# Patient Record
Sex: MALE | Race: WHITE | Employment: FULL TIME | URBAN - METROPOLITAN AREA
[De-identification: names, ages, dates, MRNs, and addresses within clinical notes are randomized per-mention and may not be internally consistent; named-entity substitution may affect disease eponyms.]

---

## 2018-09-05 ENCOUNTER — OFFICE VISIT (OUTPATIENT)
Dept: FAMILY MEDICINE CLINIC | Age: 33
End: 2018-09-05
Payer: COMMERCIAL

## 2018-09-05 VITALS
HEIGHT: 65 IN | DIASTOLIC BLOOD PRESSURE: 90 MMHG | BODY MASS INDEX: 22.26 KG/M2 | TEMPERATURE: 98.3 F | WEIGHT: 133.6 LBS | OXYGEN SATURATION: 96 % | HEART RATE: 115 BPM | SYSTOLIC BLOOD PRESSURE: 120 MMHG

## 2018-09-05 DIAGNOSIS — F17.200 SMOKER UNMOTIVATED TO QUIT: ICD-10-CM

## 2018-09-05 DIAGNOSIS — R06.2 WHEEZING: ICD-10-CM

## 2018-09-05 DIAGNOSIS — R11.2 NON-INTRACTABLE VOMITING WITH NAUSEA, UNSPECIFIED VOMITING TYPE: Primary | ICD-10-CM

## 2018-09-05 PROCEDURE — 99203 OFFICE O/P NEW LOW 30 MIN: CPT | Performed by: NURSE PRACTITIONER

## 2018-09-05 PROCEDURE — G8420 CALC BMI NORM PARAMETERS: HCPCS | Performed by: NURSE PRACTITIONER

## 2018-09-05 PROCEDURE — G8427 DOCREV CUR MEDS BY ELIG CLIN: HCPCS | Performed by: NURSE PRACTITIONER

## 2018-09-05 PROCEDURE — 4004F PT TOBACCO SCREEN RCVD TLK: CPT | Performed by: NURSE PRACTITIONER

## 2018-09-05 RX ORDER — RANITIDINE 150 MG/1
150 TABLET ORAL
COMMUNITY
Start: 2016-08-24 | End: 2018-11-08

## 2018-09-05 RX ORDER — ONDANSETRON 4 MG/1
4 TABLET, ORALLY DISINTEGRATING ORAL EVERY 8 HOURS PRN
Qty: 20 TABLET | Refills: 0 | Status: SHIPPED | OUTPATIENT
Start: 2018-09-05 | End: 2018-11-08

## 2018-09-05 RX ORDER — FOLIC ACID 1 MG/1
1 TABLET ORAL
COMMUNITY
Start: 2017-05-03 | End: 2020-08-18

## 2018-09-05 RX ORDER — ALBUTEROL SULFATE 90 UG/1
2 AEROSOL, METERED RESPIRATORY (INHALATION) EVERY 6 HOURS PRN
Qty: 1 INHALER | Refills: 3 | Status: SHIPPED | OUTPATIENT
Start: 2018-09-05 | End: 2019-01-28

## 2018-09-05 ASSESSMENT — PATIENT HEALTH QUESTIONNAIRE - PHQ9
1. LITTLE INTEREST OR PLEASURE IN DOING THINGS: 0
SUM OF ALL RESPONSES TO PHQ QUESTIONS 1-9: 0
SUM OF ALL RESPONSES TO PHQ9 QUESTIONS 1 & 2: 0
2. FEELING DOWN, DEPRESSED OR HOPELESS: 0
SUM OF ALL RESPONSES TO PHQ QUESTIONS 1-9: 0

## 2018-09-05 NOTE — PROGRESS NOTES
[address] [facility]    Malu Cervantes is a 29 y.o. male who is here with c/o of:    Chief Complaint: Nausea (started yesterday)      Patient Accompanied by: patient    ERASMO Cervantes is here with c/o    Acute GI Symptoms:  Patient complains of a 2 day history of nausea- intermittent, occuring several times per day, and lasting several hours per episode and vomiting, occuring several times per day. Symptoms have been worsening with time. Associated symptoms include none. Patient denies any other symptoms. Symptoms are exacerbated by any oral intake. Prior history of similar symptoms: no.  Relevant PMH: none. New exposures: none. Therapy to date: Pepto Bismol. Prior diagnostic testing: none. There is no problem list on file for this patient. No past medical history on file. No past surgical history on file. No family history on file. Social History   Substance Use Topics    Smoking status: Current Every Day Smoker    Smokeless tobacco: Never Used    Alcohol use Not on file     ALLERGIES:    Allergies   Allergen Reactions    Penicillins Hives          Subjective     · Constitutional:  Negative for activity change, appetite change, chills, fatigue, fever and unexpected weight change. · HENT: Negative for congestion, ear pain, rhinorrhea, sinus pain, sinus pressure and sore throat. · Eyes:  Negative for pain and discharge. · Respiratory:  Negative for cough, chest tightness, shortness of breath and wheezing. · Cardiovascular:  Negative for chest pain, palpitations and leg swelling. · Gastrointestinal: Negative for abdominal pain, blood in stool, constipation,diarrhea, Positive for nausea and vomiting. · Endocrine: Negative for cold intolerance, heat intolerance, polydipsia, polyphagia and polyuria. · Genitourinary: Negative for difficulty urinating, dysuria, flank pain, frequency, hematuria and urgency.    · Musculoskeletal: Negative for arthralgias, back educational materials - see patient instructions  3. Was a self-tracking handout given in paper form or via [employer]? No  If yes, see orders or list here. 4.  Discussed use, benefit, and side effects of prescribed medications. Barriers to medication compliance addressed. All patient questions answered. Pt voiced understanding. 5.  Reviewed prior labs, imaging, consultation, follow up, and health maintenance  6. Continue current medications, diet and exercise. 7. Discussed use, benefit, and side effects of prescribed medications. Barriers to medication compliance addressed. All her questions were answered. Pt voiced understanding. Seth Baer will continue current medications, diet and exercise. Completed Orders/Prescriptions   Orders Placed This Encounter   Medications    ondansetron (ZOFRAN-ODT) 4 MG disintegrating tablet     Sig: Take 1 tablet by mouth every 8 hours as needed for Nausea or Vomiting     Dispense:  20 tablet     Refill:  0    albuterol sulfate HFA (PROAIR HFA) 108 (90 Base) MCG/ACT inhaler     Sig: Inhale 2 puffs into the lungs every 6 hours as needed for Wheezing     Dispense:  1 Inhaler     Refill:  3    Spacer/Aero-Holding Chambers OLVIN     Si Device by Does not apply route daily as needed (wheezing)     Dispense:  1 Device     Refill:  0             Patient given educational materials on smoking cessation, dehydration    Of the 30 minute duration appointment visit, Rose Gutierrez CNP spent at least 50% of the face-to-face time in counseling, explanation of diagnosis, planning of further management, and answering all questions. Signed:  Rose Gutierrez CNP    This note is created with the assistance of a speech-recognition program.  While intending to generate a document that actually reflects the content of the visit, no guarantees can be provided that every mistake has been identified and corrected by editing.

## 2018-09-05 NOTE — LETTER
[address]  [place] [address]  Phone: [phone]  Fax: [phone]    JOAQUINA Mccracken CNP        September 5, 2018     Patient: Melissa [place]   YOB: 1985   Date of Visit: 9/5/2018       To Whom It May Concern: It is my medical opinion  Melissa [place] is unable to work until 9/7/2018    If you have any questions or concerns, please don't hesitate to call.     Sincerely,        JOAQUINA Mccracken CNP

## 2018-11-08 ENCOUNTER — OFFICE VISIT (OUTPATIENT)
Dept: FAMILY MEDICINE CLINIC | Age: 33
End: 2018-11-08
Payer: COMMERCIAL

## 2018-11-08 VITALS
TEMPERATURE: 98.6 F | WEIGHT: 142 LBS | RESPIRATION RATE: 20 BRPM | SYSTOLIC BLOOD PRESSURE: 132 MMHG | DIASTOLIC BLOOD PRESSURE: 74 MMHG | OXYGEN SATURATION: 98 % | HEART RATE: 74 BPM | BODY MASS INDEX: 23.63 KG/M2

## 2018-11-08 DIAGNOSIS — R52 GENERALIZED BODY ACHES: Primary | ICD-10-CM

## 2018-11-08 PROCEDURE — 4004F PT TOBACCO SCREEN RCVD TLK: CPT | Performed by: NURSE PRACTITIONER

## 2018-11-08 PROCEDURE — G8427 DOCREV CUR MEDS BY ELIG CLIN: HCPCS | Performed by: NURSE PRACTITIONER

## 2018-11-08 PROCEDURE — G8484 FLU IMMUNIZE NO ADMIN: HCPCS | Performed by: NURSE PRACTITIONER

## 2018-11-08 PROCEDURE — 99213 OFFICE O/P EST LOW 20 MIN: CPT | Performed by: NURSE PRACTITIONER

## 2018-11-08 PROCEDURE — G8420 CALC BMI NORM PARAMETERS: HCPCS | Performed by: NURSE PRACTITIONER

## 2018-11-08 RX ORDER — AZITHROMYCIN 500 MG/1
500 TABLET, FILM COATED ORAL DAILY
Qty: 6 TABLET | Refills: 0 | Status: CANCELLED | OUTPATIENT
Start: 2018-11-08 | End: 2018-11-11

## 2018-11-08 ASSESSMENT — ENCOUNTER SYMPTOMS
COUGH: 0
VOMITING: 0
DIARRHEA: 0
ABDOMINAL DISTENTION: 0
ABDOMINAL PAIN: 0
NAUSEA: 1
WHEEZING: 0
CHEST TIGHTNESS: 0
CONSTIPATION: 0
SHORTNESS OF BREATH: 0
BACK PAIN: 1

## 2018-11-08 NOTE — PATIENT INSTRUCTIONS
judgment and decision making. Talk with your doctor about when it is safe to drive. · Avoid alcohol, sleeping medicines, and muscle relaxers. Opioids can be dangerous if you take them with alcohol or with certain drugs. This includes over-the-counter medicines. Make sure your doctor knows about all the other medicines you take. Don't start any new medicines before you talk to your doctor or pharmacist.  · Ask your doctor about a naloxone rescue kit. It can help you--and even save your life--if you take too much of an opioid. How do you safely store opioid pills and patches? It's important to store opioids safely so that they aren't used by the wrong person. Your pain medicine is only for you to take. If someone else takes your medicine, it can harm that person. You can safely store your medicine. Follow these tips. · Store pills and patches up high and out of sight. ? Keep them away from children and pets. ? Return the container to the same place each time you take your medicine. · Try locking your opioid medicine in a cabinet. · Make sure the bottles are closed tightly. If they have a safety cap, make sure that it's locked. Tighten the cap until you hear a click or can't twist it anymore. · Keep track of how many pills or patches you have left. You may want to keep track in a notebook. · Let the people who live with you know about your medicine. ? Tell them that it is only for you to take. ? If guests have opioid medicine with them, ask them to keep it safe. How do you get rid of opioid pills and patches safely? If you have opioid pills or patches that you aren't going to use, get rid of them right away. It's also important to get rid of used opioid patches. When you get rid of these medicines safely, you take away any chance that a person or an animal might get sick from one of them. Follow one of these steps. If you can't do the first step, then take the next step.   · Bring them to a Drug Enforcement Administration (AGUSTÍN)-authorized medicine take-back program or drop-off box. ? Your local trash and recycle center, pharmacy, or hospital may offer one of these. · Throw the medicines in the trash. Take this step if you can't get to a take-back program or drop-off box and the medicine's instructions do not have specific disposal information. ? Take the medicine out of its container. ? Mix it with something that tastes bad, like cat litter or coffee grounds. ? Place the mixture in a sealed plastic bag and put the bag in your household trash. · Flush them down the sink or toilet. ? You can flush your medicine down the toilet or sink only if you can't go to a AGUSTÍN-approved site or if your medicine's instructions specifically say to.  ? If you are throwing away a patch, first fold the sticky sides together. ? To see a list of medicines that should be flushed, go to: www.fda.gov/Drugs/ResourcesForYou/Consumers/BuyingUsingMedicineSafely/EnsuringSafeUseofMedicine/SafeDisposalofMedicines/vqx661758.htm. Follow-up care is a key part of your treatment and safety. Be sure to make and go to all appointments, and call your doctor if you are having problems. It's also a good idea to know your test results and keep a list of the medicines you take. Where can you learn more? Go to https://ADstruc.FreeCharge. org and sign in to your Playnery account. Enter P175 in the [facility] box to learn more about \"Learning About Managing Acute Pain at Home. \"     If you do not have an account, please click on the \"Sign Up Now\" link. Current as of: June 4, 2018  Content Version: 11.8  © 9275-4692 Healthwise, Incorporated. Care instructions adapted under license by [facility] ([facility]). If you have questions about a medical condition or this instruction, always ask your healthcare professional. [place] [address] any warranty or liability for your use of this information.

## 2018-11-08 NOTE — LETTER
[address]  [place] [address]  Phone: [phone]  Fax: [phone]    JOAQUINA Ugarte CNP        November 8, 2018     Patient: Keya Cuello   YOB: 1985   Date of Visit: 11/8/2018       To Whom it May Concern:    Keya Cuello was seen in my clinic on 11/8/2018. He may return to work on 11/12/18. He is unable to work on 11/8/18 and 11/9/18. Please excuse his absence on these days. If you have any questions or concerns, please don't hesitate to call.     Sincerely,           JOAQUINA Ugarte CNP

## 2019-01-28 ENCOUNTER — OFFICE VISIT (OUTPATIENT)
Dept: FAMILY MEDICINE CLINIC | Age: 34
End: 2019-01-28
Payer: COMMERCIAL

## 2019-01-28 VITALS
HEART RATE: 101 BPM | SYSTOLIC BLOOD PRESSURE: 130 MMHG | OXYGEN SATURATION: 98 % | TEMPERATURE: 99 F | DIASTOLIC BLOOD PRESSURE: 84 MMHG

## 2019-01-28 DIAGNOSIS — S62.663A NONDISPLACED FRACTURE OF DISTAL PHALANX OF LEFT MIDDLE FINGER, INITIAL ENCOUNTER FOR CLOSED FRACTURE: Primary | ICD-10-CM

## 2019-01-28 DIAGNOSIS — S69.92XA INJURY OF LEFT MIDDLE FINGER, INITIAL ENCOUNTER: ICD-10-CM

## 2019-01-28 PROCEDURE — 29130 APPL FINGER SPLINT STATIC: CPT | Performed by: NURSE PRACTITIONER

## 2019-01-28 PROCEDURE — G8484 FLU IMMUNIZE NO ADMIN: HCPCS | Performed by: NURSE PRACTITIONER

## 2019-01-28 PROCEDURE — 4004F PT TOBACCO SCREEN RCVD TLK: CPT | Performed by: NURSE PRACTITIONER

## 2019-01-28 PROCEDURE — G8427 DOCREV CUR MEDS BY ELIG CLIN: HCPCS | Performed by: NURSE PRACTITIONER

## 2019-01-28 PROCEDURE — 99213 OFFICE O/P EST LOW 20 MIN: CPT | Performed by: NURSE PRACTITIONER

## 2019-01-28 PROCEDURE — G8420 CALC BMI NORM PARAMETERS: HCPCS | Performed by: NURSE PRACTITIONER

## 2019-01-28 RX ORDER — ADALIMUMAB 40MG/0.8ML
0.8 KIT SUBCUTANEOUS
COMMUNITY
Start: 2019-01-14 | End: 2020-08-18

## 2019-01-28 RX ORDER — PREDNISOLONE ACETATE 10 MG/ML
1 SUSPENSION/ DROPS OPHTHALMIC 4 TIMES DAILY
COMMUNITY
Start: 2019-01-03 | End: 2020-08-18

## 2019-01-28 ASSESSMENT — ENCOUNTER SYMPTOMS
SHORTNESS OF BREATH: 0
WHEEZING: 0
COUGH: 0
RESPIRATORY NEGATIVE: 1
COLOR CHANGE: 1
CHEST TIGHTNESS: 0

## 2020-08-29 ENCOUNTER — HOSPITAL ENCOUNTER (OUTPATIENT)
Dept: PREADMISSION TESTING | Age: 35
Setting detail: SPECIMEN
Discharge: HOME | End: 2020-09-02
Payer: COMMERCIAL

## 2020-08-29 PROCEDURE — U0003 INFECTIOUS AGENT DETECTION BY NUCLEIC ACID (DNA OR RNA); SEVERE ACUTE RESPIRATORY SYNDROME CORONAVIRUS 2 (SARS-COV-2) (CORONAVIRUS DISEASE [COVID-19]), AMPLIFIED PROBE TECHNIQUE, MAKING USE OF HIGH THROUGHPUT TECHNOLOGIES AS DESCRIBED BY CMS-2020-01-R: HCPCS

## 2020-08-31 LAB — SARS-COV-2, NAA: NOT DETECTED

## 2020-09-01 ENCOUNTER — TELEPHONE (OUTPATIENT)
Dept: PRIMARY CARE CLINIC | Age: 35
End: 2020-09-01

## 2020-09-02 ENCOUNTER — ANESTHESIA EVENT (OUTPATIENT)
Dept: OPERATING ROOM | Age: 35
DRG: 455 | End: 2020-09-02
Payer: COMMERCIAL

## 2020-09-02 ENCOUNTER — APPOINTMENT (OUTPATIENT)
Dept: GENERAL RADIOLOGY | Age: 35
DRG: 455 | End: 2020-09-02
Attending: ORTHOPAEDIC SURGERY
Payer: COMMERCIAL

## 2020-09-02 ENCOUNTER — HOSPITAL ENCOUNTER (INPATIENT)
Age: 35
LOS: 1 days | Discharge: HOME | DRG: 455 | End: 2020-09-03
Attending: ORTHOPAEDIC SURGERY | Admitting: ORTHOPAEDIC SURGERY
Payer: COMMERCIAL

## 2020-09-02 ENCOUNTER — ANESTHESIA (OUTPATIENT)
Dept: OPERATING ROOM | Age: 35
DRG: 455 | End: 2020-09-02
Payer: COMMERCIAL

## 2020-09-02 VITALS — OXYGEN SATURATION: 100 % | SYSTOLIC BLOOD PRESSURE: 98 MMHG | DIASTOLIC BLOOD PRESSURE: 51 MMHG | TEMPERATURE: 98.1 F

## 2020-09-02 PROBLEM — M43.10 SPONDYLOLISTHESIS, ACQUIRED: Chronic | Status: ACTIVE | Noted: 2020-09-02

## 2020-09-02 PROBLEM — M48.061 FORAMINAL STENOSIS OF LUMBAR REGION: Chronic | Status: ACTIVE | Noted: 2020-09-02

## 2020-09-02 LAB
ABSOLUTE EOS #: 0.2 K/UL (ref 0–0.44)
ABSOLUTE IMMATURE GRANULOCYTE: 0.03 K/UL (ref 0–0.3)
ABSOLUTE LYMPH #: 1.25 K/UL (ref 1.1–3.7)
ABSOLUTE MONO #: 0.9 K/UL (ref 0.1–1.2)
ANION GAP SERPL CALCULATED.3IONS-SCNC: 13 MMOL/L (ref 9–17)
BASOPHILS # BLD: 1 % (ref 0–2)
BASOPHILS ABSOLUTE: 0.08 K/UL (ref 0–0.2)
BILIRUBIN URINE: NEGATIVE
BUN BLDV-MCNC: 11 MG/DL (ref 6–20)
BUN/CREAT BLD: 19 (ref 9–20)
CALCIUM SERPL-MCNC: 9.9 MG/DL (ref 8.6–10.4)
CHLORIDE BLD-SCNC: 98 MMOL/L (ref 98–107)
CO2: 25 MMOL/L (ref 20–31)
COLOR: YELLOW
COMMENT UA: NORMAL
CREAT SERPL-MCNC: 0.58 MG/DL (ref 0.7–1.2)
DIFFERENTIAL TYPE: ABNORMAL
EOSINOPHILS RELATIVE PERCENT: 3 % (ref 1–4)
GFR AFRICAN AMERICAN: >60 ML/MIN
GFR NON-AFRICAN AMERICAN: >60 ML/MIN
GFR SERPL CREATININE-BSD FRML MDRD: ABNORMAL ML/MIN/{1.73_M2}
GFR SERPL CREATININE-BSD FRML MDRD: ABNORMAL ML/MIN/{1.73_M2}
GLUCOSE BLD-MCNC: 106 MG/DL (ref 70–99)
GLUCOSE URINE: NEGATIVE
HCT VFR BLD CALC: 45.5 % (ref 40.7–50.3)
HEMOGLOBIN: 16 G/DL (ref 13–17)
IMMATURE GRANULOCYTES: 0 %
INR BLD: 0.9
KETONES, URINE: NEGATIVE
LEUKOCYTE ESTERASE, URINE: NEGATIVE
LYMPHOCYTES # BLD: 17 % (ref 24–43)
MCH RBC QN AUTO: 34.3 PG (ref 25.2–33.5)
MCHC RBC AUTO-ENTMCNC: 35.2 G/DL (ref 28.4–34.8)
MCV RBC AUTO: 97.6 FL (ref 82.6–102.9)
MONOCYTES # BLD: 12 % (ref 3–12)
NITRITE, URINE: NEGATIVE
NRBC AUTOMATED: 0 PER 100 WBC
PARTIAL THROMBOPLASTIN TIME: 29.9 SEC (ref 23.9–33.8)
PDW BLD-RTO: 13.2 % (ref 11.8–14.4)
PH UA: 7.5 (ref 5–8)
PLATELET # BLD: 203 K/UL (ref 138–453)
PLATELET ESTIMATE: ABNORMAL
PMV BLD AUTO: 9.7 FL (ref 8.1–13.5)
POTASSIUM SERPL-SCNC: 4.2 MMOL/L (ref 3.7–5.3)
PROTEIN UA: NEGATIVE
PROTHROMBIN TIME: 12.1 SEC (ref 11.5–14.2)
RBC # BLD: 4.66 M/UL (ref 4.21–5.77)
RBC # BLD: ABNORMAL 10*6/UL
SEG NEUTROPHILS: 67 % (ref 36–65)
SEGMENTED NEUTROPHILS ABSOLUTE COUNT: 4.94 K/UL (ref 1.5–8.1)
SODIUM BLD-SCNC: 136 MMOL/L (ref 135–144)
SPECIFIC GRAVITY UA: 1.01 (ref 1–1.03)
TURBIDITY: CLEAR
URINE HGB: NEGATIVE
UROBILINOGEN, URINE: NORMAL
WBC # BLD: 7.4 K/UL (ref 3.5–11.3)
WBC # BLD: ABNORMAL 10*3/UL

## 2020-09-02 PROCEDURE — 0SB20ZZ EXCISION OF LUMBAR VERTEBRAL DISC, OPEN APPROACH: ICD-10-PCS | Performed by: ORTHOPAEDIC SURGERY

## 2020-09-02 PROCEDURE — 85610 PROTHROMBIN TIME: CPT

## 2020-09-02 PROCEDURE — 85730 THROMBOPLASTIN TIME PARTIAL: CPT

## 2020-09-02 PROCEDURE — 2780000010 HC IMPLANT OTHER: Performed by: ORTHOPAEDIC SURGERY

## 2020-09-02 PROCEDURE — 2580000003 HC RX 258: Performed by: NURSE ANESTHETIST, CERTIFIED REGISTERED

## 2020-09-02 PROCEDURE — 6360000002 HC RX W HCPCS: Performed by: ORTHOPAEDIC SURGERY

## 2020-09-02 PROCEDURE — 2720000010 HC SURG SUPPLY STERILE: Performed by: ORTHOPAEDIC SURGERY

## 2020-09-02 PROCEDURE — 2580000003 HC RX 258: Performed by: ORTHOPAEDIC SURGERY

## 2020-09-02 PROCEDURE — 7100000001 HC PACU RECOVERY - ADDTL 15 MIN: Performed by: ORTHOPAEDIC SURGERY

## 2020-09-02 PROCEDURE — 3700000000 HC ANESTHESIA ATTENDED CARE: Performed by: ORTHOPAEDIC SURGERY

## 2020-09-02 PROCEDURE — 3600000004 HC SURGERY LEVEL 4 BASE: Performed by: ORTHOPAEDIC SURGERY

## 2020-09-02 PROCEDURE — 0SG0071 FUSION OF LUMBAR VERTEBRAL JOINT WITH AUTOLOGOUS TISSUE SUBSTITUTE, POSTERIOR APPROACH, POSTERIOR COLUMN, OPEN APPROACH: ICD-10-PCS | Performed by: ORTHOPAEDIC SURGERY

## 2020-09-02 PROCEDURE — 85025 COMPLETE CBC W/AUTO DIFF WBC: CPT

## 2020-09-02 PROCEDURE — 3600000014 HC SURGERY LEVEL 4 ADDTL 15MIN: Performed by: ORTHOPAEDIC SURGERY

## 2020-09-02 PROCEDURE — 2500000003 HC RX 250 WO HCPCS: Performed by: ORTHOPAEDIC SURGERY

## 2020-09-02 PROCEDURE — 0SB40ZZ EXCISION OF LUMBOSACRAL DISC, OPEN APPROACH: ICD-10-PCS | Performed by: ORTHOPAEDIC SURGERY

## 2020-09-02 PROCEDURE — 6360000002 HC RX W HCPCS: Performed by: NURSE ANESTHETIST, CERTIFIED REGISTERED

## 2020-09-02 PROCEDURE — 36415 COLL VENOUS BLD VENIPUNCTURE: CPT

## 2020-09-02 PROCEDURE — 0SG00AJ FUSION OF LUMBAR VERTEBRAL JOINT WITH INTERBODY FUSION DEVICE, POSTERIOR APPROACH, ANTERIOR COLUMN, OPEN APPROACH: ICD-10-PCS | Performed by: ORTHOPAEDIC SURGERY

## 2020-09-02 PROCEDURE — 93005 ELECTROCARDIOGRAM TRACING: CPT | Performed by: ORTHOPAEDIC SURGERY

## 2020-09-02 PROCEDURE — 6360000002 HC RX W HCPCS: Performed by: ANESTHESIOLOGY

## 2020-09-02 PROCEDURE — 2709999900 HC NON-CHARGEABLE SUPPLY: Performed by: ORTHOPAEDIC SURGERY

## 2020-09-02 PROCEDURE — 3209999900 FLUORO FOR SURGICAL PROCEDURES

## 2020-09-02 PROCEDURE — 2500000003 HC RX 250 WO HCPCS: Performed by: NURSE ANESTHETIST, CERTIFIED REGISTERED

## 2020-09-02 PROCEDURE — 3700000001 HC ADD 15 MINUTES (ANESTHESIA): Performed by: ORTHOPAEDIC SURGERY

## 2020-09-02 PROCEDURE — 1200000000 HC SEMI PRIVATE

## 2020-09-02 PROCEDURE — 72100 X-RAY EXAM L-S SPINE 2/3 VWS: CPT

## 2020-09-02 PROCEDURE — 81003 URINALYSIS AUTO W/O SCOPE: CPT

## 2020-09-02 PROCEDURE — C1713 ANCHOR/SCREW BN/BN,TIS/BN: HCPCS | Performed by: ORTHOPAEDIC SURGERY

## 2020-09-02 PROCEDURE — 01NB0ZZ RELEASE LUMBAR NERVE, OPEN APPROACH: ICD-10-PCS | Performed by: ORTHOPAEDIC SURGERY

## 2020-09-02 PROCEDURE — C9359 IMPLNT,BON VOID FILLER-PUTTY: HCPCS | Performed by: ORTHOPAEDIC SURGERY

## 2020-09-02 PROCEDURE — 87086 URINE CULTURE/COLONY COUNT: CPT

## 2020-09-02 PROCEDURE — 6370000000 HC RX 637 (ALT 250 FOR IP): Performed by: ORTHOPAEDIC SURGERY

## 2020-09-02 PROCEDURE — 2580000003 HC RX 258: Performed by: ANESTHESIOLOGY

## 2020-09-02 PROCEDURE — 80048 BASIC METABOLIC PNL TOTAL CA: CPT

## 2020-09-02 PROCEDURE — 7100000000 HC PACU RECOVERY - FIRST 15 MIN: Performed by: ORTHOPAEDIC SURGERY

## 2020-09-02 PROCEDURE — 01NR0ZZ RELEASE SACRAL NERVE, OPEN APPROACH: ICD-10-PCS | Performed by: ORTHOPAEDIC SURGERY

## 2020-09-02 PROCEDURE — 0SG30AJ FUSION OF LUMBOSACRAL JOINT WITH INTERBODY FUSION DEVICE, POSTERIOR APPROACH, ANTERIOR COLUMN, OPEN APPROACH: ICD-10-PCS | Performed by: ORTHOPAEDIC SURGERY

## 2020-09-02 DEVICE — SCREW SPNL L50MM DIA6.5MM POST THORACOLUMBOSACRAL MOD SHANK: Type: IMPLANTABLE DEVICE | Site: BACK | Status: FUNCTIONAL

## 2020-09-02 DEVICE — SCREW SPNL L50MM DIA6.5MM 2C POLYAX RELINE MAS: Type: IMPLANTABLE DEVICE | Site: BACK | Status: FUNCTIONAL

## 2020-09-02 DEVICE — TIM-TISSUE CANCELLOUS 30.0CC CRUSHED: Type: IMPLANTABLE DEVICE | Site: BACK | Status: FUNCTIONAL

## 2020-09-02 DEVICE — CAGE SPNL 4 W12XH10XL30MM OBLQ TRANSFORAMINAL LUM INTBDY: Type: IMPLANTABLE DEVICE | Site: BACK | Status: FUNCTIONAL

## 2020-09-02 DEVICE — DBM 006005 PROGENIX PLUS 5CC
Type: IMPLANTABLE DEVICE | Site: BACK | Status: FUNCTIONAL
Brand: PROGENIX® PUTTY AND PROGENIX® PLUS

## 2020-09-02 DEVICE — SCREW SPNL DIA5.5MM OPN TULIP LOK RELINE: Type: IMPLANTABLE DEVICE | Site: BACK | Status: FUNCTIONAL

## 2020-09-02 DEVICE — IMPLANTABLE DEVICE: Type: IMPLANTABLE DEVICE | Site: BACK | Status: FUNCTIONAL

## 2020-09-02 DEVICE — SCREW SPNL MOD TULIP RELINE: Type: IMPLANTABLE DEVICE | Site: BACK | Status: FUNCTIONAL

## 2020-09-02 DEVICE — SCREW SPNL L40MM DIA6.5MM POST THORACOLUMBOSACRAL MOD SHANK: Type: IMPLANTABLE DEVICE | Site: BACK | Status: FUNCTIONAL

## 2020-09-02 DEVICE — SCREW SPNL L45MM DIA6.5MM POST THORACOLUMBOSACRAL MOD SHANK: Type: IMPLANTABLE DEVICE | Site: BACK | Status: FUNCTIONAL

## 2020-09-02 RX ORDER — ONDANSETRON 2 MG/ML
4 INJECTION INTRAMUSCULAR; INTRAVENOUS
Status: DISCONTINUED | OUTPATIENT
Start: 2020-09-02 | End: 2020-09-02 | Stop reason: HOSPADM

## 2020-09-02 RX ORDER — SODIUM CHLORIDE 9 MG/ML
INJECTION, SOLUTION INTRAVENOUS CONTINUOUS
Status: DISCONTINUED | OUTPATIENT
Start: 2020-09-02 | End: 2020-09-03 | Stop reason: HOSPADM

## 2020-09-02 RX ORDER — PROPOFOL 10 MG/ML
INJECTION, EMULSION INTRAVENOUS PRN
Status: DISCONTINUED | OUTPATIENT
Start: 2020-09-02 | End: 2020-09-02 | Stop reason: SDUPTHER

## 2020-09-02 RX ORDER — PANTOPRAZOLE SODIUM 40 MG/1
40 TABLET, DELAYED RELEASE ORAL
Status: DISCONTINUED | OUTPATIENT
Start: 2020-09-03 | End: 2020-09-03 | Stop reason: HOSPADM

## 2020-09-02 RX ORDER — SENNA AND DOCUSATE SODIUM 50; 8.6 MG/1; MG/1
1 TABLET, FILM COATED ORAL 2 TIMES DAILY
Status: DISCONTINUED | OUTPATIENT
Start: 2020-09-02 | End: 2020-09-03 | Stop reason: HOSPADM

## 2020-09-02 RX ORDER — CLINDAMYCIN PHOSPHATE 900 MG/50ML
900 INJECTION INTRAVENOUS ONCE
Status: COMPLETED | OUTPATIENT
Start: 2020-09-02 | End: 2020-09-02

## 2020-09-02 RX ORDER — ONDANSETRON 2 MG/ML
INJECTION INTRAMUSCULAR; INTRAVENOUS PRN
Status: DISCONTINUED | OUTPATIENT
Start: 2020-09-02 | End: 2020-09-02 | Stop reason: SDUPTHER

## 2020-09-02 RX ORDER — VANCOMYCIN HYDROCHLORIDE 1 G/20ML
INJECTION, POWDER, LYOPHILIZED, FOR SOLUTION INTRAVENOUS PRN
Status: DISCONTINUED | OUTPATIENT
Start: 2020-09-02 | End: 2020-09-02 | Stop reason: HOSPADM

## 2020-09-02 RX ORDER — MIDAZOLAM HYDROCHLORIDE 1 MG/ML
INJECTION INTRAMUSCULAR; INTRAVENOUS PRN
Status: DISCONTINUED | OUTPATIENT
Start: 2020-09-02 | End: 2020-09-02 | Stop reason: SDUPTHER

## 2020-09-02 RX ORDER — OXYCODONE HYDROCHLORIDE AND ACETAMINOPHEN 5; 325 MG/1; MG/1
2 TABLET ORAL EVERY 4 HOURS PRN
Status: DISCONTINUED | OUTPATIENT
Start: 2020-09-02 | End: 2020-09-03 | Stop reason: HOSPADM

## 2020-09-02 RX ORDER — SODIUM CHLORIDE 9 MG/ML
INJECTION, SOLUTION INTRAVENOUS CONTINUOUS
Status: DISCONTINUED | OUTPATIENT
Start: 2020-09-02 | End: 2020-09-02

## 2020-09-02 RX ORDER — HEPARIN SODIUM 10000 [USP'U]/ML
INJECTION, SOLUTION INTRAVENOUS; SUBCUTANEOUS PRN
Status: DISCONTINUED | OUTPATIENT
Start: 2020-09-02 | End: 2020-09-02 | Stop reason: HOSPADM

## 2020-09-02 RX ORDER — SODIUM CHLORIDE 0.9 % (FLUSH) 0.9 %
10 SYRINGE (ML) INJECTION EVERY 12 HOURS SCHEDULED
Status: DISCONTINUED | OUTPATIENT
Start: 2020-09-02 | End: 2020-09-03 | Stop reason: HOSPADM

## 2020-09-02 RX ORDER — FENTANYL CITRATE 50 UG/ML
25 INJECTION, SOLUTION INTRAMUSCULAR; INTRAVENOUS EVERY 5 MIN PRN
Status: DISCONTINUED | OUTPATIENT
Start: 2020-09-02 | End: 2020-09-02 | Stop reason: HOSPADM

## 2020-09-02 RX ORDER — HYDROMORPHONE HCL 110MG/55ML
PATIENT CONTROLLED ANALGESIA SYRINGE INTRAVENOUS PRN
Status: DISCONTINUED | OUTPATIENT
Start: 2020-09-02 | End: 2020-09-02 | Stop reason: SDUPTHER

## 2020-09-02 RX ORDER — HYDROMORPHONE HCL 110MG/55ML
0.25 PATIENT CONTROLLED ANALGESIA SYRINGE INTRAVENOUS EVERY 5 MIN PRN
Status: DISCONTINUED | OUTPATIENT
Start: 2020-09-02 | End: 2020-09-02 | Stop reason: HOSPADM

## 2020-09-02 RX ORDER — OMEPRAZOLE 10 MG/1
10 CAPSULE, DELAYED RELEASE ORAL DAILY
Status: DISCONTINUED | OUTPATIENT
Start: 2020-09-02 | End: 2020-09-02

## 2020-09-02 RX ORDER — SODIUM CHLORIDE 0.9 % (FLUSH) 0.9 %
10 SYRINGE (ML) INJECTION EVERY 12 HOURS SCHEDULED
Status: DISCONTINUED | OUTPATIENT
Start: 2020-09-02 | End: 2020-09-02 | Stop reason: HOSPADM

## 2020-09-02 RX ORDER — TIZANIDINE 4 MG/1
4 TABLET ORAL EVERY 8 HOURS PRN
Status: DISCONTINUED | OUTPATIENT
Start: 2020-09-02 | End: 2020-09-03 | Stop reason: HOSPADM

## 2020-09-02 RX ORDER — BUPIVACAINE HYDROCHLORIDE AND EPINEPHRINE 5; 5 MG/ML; UG/ML
INJECTION, SOLUTION EPIDURAL; INTRACAUDAL; PERINEURAL PRN
Status: DISCONTINUED | OUTPATIENT
Start: 2020-09-02 | End: 2020-09-02 | Stop reason: HOSPADM

## 2020-09-02 RX ORDER — PROMETHAZINE HYDROCHLORIDE 12.5 MG/1
12.5 TABLET ORAL EVERY 6 HOURS PRN
Status: DISCONTINUED | OUTPATIENT
Start: 2020-09-02 | End: 2020-09-03 | Stop reason: HOSPADM

## 2020-09-02 RX ORDER — DIPHENHYDRAMINE HCL 25 MG
25 TABLET ORAL EVERY 6 HOURS PRN
Status: DISCONTINUED | OUTPATIENT
Start: 2020-09-02 | End: 2020-09-03 | Stop reason: HOSPADM

## 2020-09-02 RX ORDER — ONDANSETRON 2 MG/ML
4 INJECTION INTRAMUSCULAR; INTRAVENOUS EVERY 6 HOURS PRN
Status: DISCONTINUED | OUTPATIENT
Start: 2020-09-02 | End: 2020-09-03 | Stop reason: HOSPADM

## 2020-09-02 RX ORDER — SODIUM CHLORIDE, SODIUM LACTATE, POTASSIUM CHLORIDE, CALCIUM CHLORIDE 600; 310; 30; 20 MG/100ML; MG/100ML; MG/100ML; MG/100ML
INJECTION, SOLUTION INTRAVENOUS CONTINUOUS PRN
Status: DISCONTINUED | OUTPATIENT
Start: 2020-09-02 | End: 2020-09-02 | Stop reason: SDUPTHER

## 2020-09-02 RX ORDER — CLINDAMYCIN PHOSPHATE 900 MG/50ML
900 INJECTION INTRAVENOUS EVERY 8 HOURS
Status: COMPLETED | OUTPATIENT
Start: 2020-09-02 | End: 2020-09-03

## 2020-09-02 RX ORDER — SODIUM CHLORIDE 0.9 % (FLUSH) 0.9 %
10 SYRINGE (ML) INJECTION PRN
Status: DISCONTINUED | OUTPATIENT
Start: 2020-09-02 | End: 2020-09-02 | Stop reason: HOSPADM

## 2020-09-02 RX ORDER — HYDROMORPHONE HCL 110MG/55ML
0.5 PATIENT CONTROLLED ANALGESIA SYRINGE INTRAVENOUS EVERY 5 MIN PRN
Status: DISCONTINUED | OUTPATIENT
Start: 2020-09-02 | End: 2020-09-02 | Stop reason: HOSPADM

## 2020-09-02 RX ORDER — KETAMINE HCL IN NACL, ISO-OSM 100MG/10ML
SYRINGE (ML) INJECTION PRN
Status: DISCONTINUED | OUTPATIENT
Start: 2020-09-02 | End: 2020-09-02 | Stop reason: SDUPTHER

## 2020-09-02 RX ORDER — LIDOCAINE HYDROCHLORIDE 10 MG/ML
1 INJECTION, SOLUTION EPIDURAL; INFILTRATION; INTRACAUDAL; PERINEURAL
Status: DISCONTINUED | OUTPATIENT
Start: 2020-09-02 | End: 2020-09-02 | Stop reason: HOSPADM

## 2020-09-02 RX ORDER — DEXAMETHASONE SODIUM PHOSPHATE 10 MG/ML
INJECTION, SOLUTION INTRAMUSCULAR; INTRAVENOUS PRN
Status: DISCONTINUED | OUTPATIENT
Start: 2020-09-02 | End: 2020-09-02 | Stop reason: SDUPTHER

## 2020-09-02 RX ORDER — SODIUM CHLORIDE, SODIUM LACTATE, POTASSIUM CHLORIDE, CALCIUM CHLORIDE 600; 310; 30; 20 MG/100ML; MG/100ML; MG/100ML; MG/100ML
INJECTION, SOLUTION INTRAVENOUS CONTINUOUS
Status: DISCONTINUED | OUTPATIENT
Start: 2020-09-02 | End: 2020-09-02

## 2020-09-02 RX ORDER — SODIUM CHLORIDE 0.9 % (FLUSH) 0.9 %
10 SYRINGE (ML) INJECTION PRN
Status: DISCONTINUED | OUTPATIENT
Start: 2020-09-02 | End: 2020-09-03 | Stop reason: HOSPADM

## 2020-09-02 RX ORDER — DEXAMETHASONE SODIUM PHOSPHATE 10 MG/ML
6 INJECTION INTRAMUSCULAR; INTRAVENOUS EVERY 8 HOURS
Status: DISCONTINUED | OUTPATIENT
Start: 2020-09-02 | End: 2020-09-03 | Stop reason: HOSPADM

## 2020-09-02 RX ORDER — NICOTINE 21 MG/24HR
1 PATCH, TRANSDERMAL 24 HOURS TRANSDERMAL DAILY
Status: DISCONTINUED | OUTPATIENT
Start: 2020-09-02 | End: 2020-09-03 | Stop reason: HOSPADM

## 2020-09-02 RX ORDER — OXYCODONE HYDROCHLORIDE AND ACETAMINOPHEN 5; 325 MG/1; MG/1
1 TABLET ORAL EVERY 4 HOURS PRN
Status: DISCONTINUED | OUTPATIENT
Start: 2020-09-02 | End: 2020-09-03 | Stop reason: HOSPADM

## 2020-09-02 RX ORDER — POLYETHYLENE GLYCOL 3350 17 G/17G
17 POWDER, FOR SOLUTION ORAL DAILY
Status: DISCONTINUED | OUTPATIENT
Start: 2020-09-02 | End: 2020-09-03 | Stop reason: HOSPADM

## 2020-09-02 RX ORDER — LIDOCAINE HYDROCHLORIDE 20 MG/ML
INJECTION, SOLUTION EPIDURAL; INFILTRATION; INTRACAUDAL; PERINEURAL PRN
Status: DISCONTINUED | OUTPATIENT
Start: 2020-09-02 | End: 2020-09-02 | Stop reason: SDUPTHER

## 2020-09-02 RX ORDER — FENTANYL CITRATE 50 UG/ML
50 INJECTION, SOLUTION INTRAMUSCULAR; INTRAVENOUS EVERY 5 MIN PRN
Status: DISCONTINUED | OUTPATIENT
Start: 2020-09-02 | End: 2020-09-02 | Stop reason: HOSPADM

## 2020-09-02 RX ORDER — SUCCINYLCHOLINE CHLORIDE 20 MG/ML
INJECTION INTRAMUSCULAR; INTRAVENOUS PRN
Status: DISCONTINUED | OUTPATIENT
Start: 2020-09-02 | End: 2020-09-02 | Stop reason: SDUPTHER

## 2020-09-02 RX ORDER — FENTANYL CITRATE 50 UG/ML
INJECTION, SOLUTION INTRAMUSCULAR; INTRAVENOUS PRN
Status: DISCONTINUED | OUTPATIENT
Start: 2020-09-02 | End: 2020-09-02 | Stop reason: SDUPTHER

## 2020-09-02 RX ORDER — MORPHINE SULFATE 15 MG/1
15 TABLET, FILM COATED, EXTENDED RELEASE ORAL EVERY 12 HOURS SCHEDULED
Status: DISCONTINUED | OUTPATIENT
Start: 2020-09-02 | End: 2020-09-03 | Stop reason: HOSPADM

## 2020-09-02 RX ADMIN — HYDROMORPHONE HYDROCHLORIDE 0.5 MG: 1 INJECTION, SOLUTION INTRAMUSCULAR; INTRAVENOUS; SUBCUTANEOUS at 20:34

## 2020-09-02 RX ADMIN — ONDANSETRON 4 MG: 2 INJECTION, SOLUTION INTRAMUSCULAR; INTRAVENOUS at 14:47

## 2020-09-02 RX ADMIN — HYDROMORPHONE HYDROCHLORIDE 0.5 MG: 2 INJECTION INTRAMUSCULAR; INTRAVENOUS; SUBCUTANEOUS at 14:56

## 2020-09-02 RX ADMIN — SODIUM CHLORIDE: 9 INJECTION, SOLUTION INTRAVENOUS at 18:40

## 2020-09-02 RX ADMIN — FENTANYL CITRATE 50 MCG: 50 INJECTION, SOLUTION INTRAMUSCULAR; INTRAVENOUS at 10:38

## 2020-09-02 RX ADMIN — CLINDAMYCIN PHOSPHATE 900 MG: 900 INJECTION, SOLUTION INTRAVENOUS at 18:44

## 2020-09-02 RX ADMIN — SODIUM CHLORIDE, POTASSIUM CHLORIDE, SODIUM LACTATE AND CALCIUM CHLORIDE: 600; 310; 30; 20 INJECTION, SOLUTION INTRAVENOUS at 10:02

## 2020-09-02 RX ADMIN — HYDROMORPHONE HYDROCHLORIDE 0.5 MG: 2 INJECTION INTRAMUSCULAR; INTRAVENOUS; SUBCUTANEOUS at 12:18

## 2020-09-02 RX ADMIN — HYDROMORPHONE HYDROCHLORIDE 0.5 MG: 2 INJECTION INTRAMUSCULAR; INTRAVENOUS; SUBCUTANEOUS at 11:44

## 2020-09-02 RX ADMIN — HYDROMORPHONE HYDROCHLORIDE 1 MG: 2 INJECTION INTRAMUSCULAR; INTRAVENOUS; SUBCUTANEOUS at 14:32

## 2020-09-02 RX ADMIN — LIDOCAINE HYDROCHLORIDE 100 MG: 20 INJECTION, SOLUTION EPIDURAL; INFILTRATION; INTRACAUDAL; PERINEURAL at 10:32

## 2020-09-02 RX ADMIN — FENTANYL CITRATE 50 MCG: 50 INJECTION, SOLUTION INTRAMUSCULAR; INTRAVENOUS at 17:26

## 2020-09-02 RX ADMIN — DEXAMETHASONE SODIUM PHOSPHATE 6 MG: 10 INJECTION INTRAMUSCULAR; INTRAVENOUS at 18:42

## 2020-09-02 RX ADMIN — HYDROMORPHONE HYDROCHLORIDE 0.5 MG: 2 INJECTION INTRAMUSCULAR; INTRAVENOUS; SUBCUTANEOUS at 14:10

## 2020-09-02 RX ADMIN — MORPHINE SULFATE 15 MG: 15 TABLET, FILM COATED, EXTENDED RELEASE ORAL at 18:42

## 2020-09-02 RX ADMIN — MIDAZOLAM 2 MG: 1 INJECTION INTRAMUSCULAR; INTRAVENOUS at 10:30

## 2020-09-02 RX ADMIN — SENNOSIDES AND DOCUSATE SODIUM 1 TABLET: 8.6; 5 TABLET ORAL at 20:27

## 2020-09-02 RX ADMIN — OXYCODONE HYDROCHLORIDE AND ACETAMINOPHEN 2 TABLET: 5; 325 TABLET ORAL at 22:45

## 2020-09-02 RX ADMIN — FENTANYL CITRATE 50 MCG: 50 INJECTION, SOLUTION INTRAMUSCULAR; INTRAVENOUS at 16:44

## 2020-09-02 RX ADMIN — SUCCINYLCHOLINE CHLORIDE 100 MG: 20 INJECTION, SOLUTION INTRAMUSCULAR; INTRAVENOUS at 10:32

## 2020-09-02 RX ADMIN — HYDROMORPHONE HYDROCHLORIDE 0.5 MG: 2 INJECTION INTRAMUSCULAR; INTRAVENOUS; SUBCUTANEOUS at 11:13

## 2020-09-02 RX ADMIN — Medication 10 MG: at 11:22

## 2020-09-02 RX ADMIN — MIDAZOLAM 2 MG: 1 INJECTION INTRAMUSCULAR; INTRAVENOUS at 10:28

## 2020-09-02 RX ADMIN — PROPOFOL 200 MG: 10 INJECTION, EMULSION INTRAVENOUS at 10:32

## 2020-09-02 RX ADMIN — OXYCODONE HYDROCHLORIDE AND ACETAMINOPHEN 2 TABLET: 5; 325 TABLET ORAL at 18:42

## 2020-09-02 RX ADMIN — DEXAMETHASONE SODIUM PHOSPHATE 10 MG: 10 INJECTION INTRAMUSCULAR; INTRAVENOUS at 10:48

## 2020-09-02 RX ADMIN — Medication 15 MG: at 11:54

## 2020-09-02 RX ADMIN — SODIUM CHLORIDE, POTASSIUM CHLORIDE, SODIUM LACTATE AND CALCIUM CHLORIDE: 600; 310; 30; 20 INJECTION, SOLUTION INTRAVENOUS at 10:28

## 2020-09-02 RX ADMIN — FENTANYL CITRATE 150 MCG: 50 INJECTION, SOLUTION INTRAMUSCULAR; INTRAVENOUS at 10:32

## 2020-09-02 RX ADMIN — SODIUM CHLORIDE, POTASSIUM CHLORIDE, SODIUM LACTATE AND CALCIUM CHLORIDE: 600; 310; 30; 20 INJECTION, SOLUTION INTRAVENOUS at 12:05

## 2020-09-02 RX ADMIN — PROPOFOL 100 MG: 10 INJECTION, EMULSION INTRAVENOUS at 10:38

## 2020-09-02 RX ADMIN — Medication 25 MG: at 10:41

## 2020-09-02 RX ADMIN — TIZANIDINE 4 MG: 4 TABLET ORAL at 22:45

## 2020-09-02 RX ADMIN — CLINDAMYCIN PHOSPHATE 900 MG: 900 INJECTION, SOLUTION INTRAVENOUS at 10:48

## 2020-09-02 RX ADMIN — HYDROMORPHONE HYDROCHLORIDE 0.5 MG: 2 INJECTION INTRAMUSCULAR; INTRAVENOUS; SUBCUTANEOUS at 15:12

## 2020-09-02 RX ADMIN — SODIUM CHLORIDE, POTASSIUM CHLORIDE, SODIUM LACTATE AND CALCIUM CHLORIDE: 600; 310; 30; 20 INJECTION, SOLUTION INTRAVENOUS at 14:24

## 2020-09-02 RX ADMIN — FENTANYL CITRATE 50 MCG: 50 INJECTION, SOLUTION INTRAMUSCULAR; INTRAVENOUS at 11:11

## 2020-09-02 ASSESSMENT — PULMONARY FUNCTION TESTS
PIF_VALUE: 23
PIF_VALUE: 20
PIF_VALUE: 16
PIF_VALUE: 20
PIF_VALUE: 16
PIF_VALUE: 2
PIF_VALUE: 21
PIF_VALUE: 17
PIF_VALUE: 20
PIF_VALUE: 22
PIF_VALUE: 20
PIF_VALUE: 19
PIF_VALUE: 20
PIF_VALUE: 20
PIF_VALUE: 16
PIF_VALUE: 21
PIF_VALUE: 20
PIF_VALUE: 3
PIF_VALUE: 19
PIF_VALUE: 21
PIF_VALUE: 20
PIF_VALUE: 0
PIF_VALUE: 19
PIF_VALUE: 16
PIF_VALUE: 19
PIF_VALUE: 22
PIF_VALUE: 21
PIF_VALUE: 19
PIF_VALUE: 20
PIF_VALUE: 20
PIF_VALUE: 22
PIF_VALUE: 22
PIF_VALUE: 21
PIF_VALUE: 20
PIF_VALUE: 2
PIF_VALUE: 20
PIF_VALUE: 21
PIF_VALUE: 20
PIF_VALUE: 16
PIF_VALUE: 21
PIF_VALUE: 20
PIF_VALUE: 22
PIF_VALUE: 20
PIF_VALUE: 20
PIF_VALUE: 24
PIF_VALUE: 20
PIF_VALUE: 21
PIF_VALUE: 21
PIF_VALUE: 20
PIF_VALUE: 21
PIF_VALUE: 21
PIF_VALUE: 20
PIF_VALUE: 16
PIF_VALUE: 21
PIF_VALUE: 19
PIF_VALUE: 20
PIF_VALUE: 19
PIF_VALUE: 20
PIF_VALUE: 19
PIF_VALUE: 21
PIF_VALUE: 4
PIF_VALUE: 15
PIF_VALUE: 19
PIF_VALUE: 20
PIF_VALUE: 20
PIF_VALUE: 22
PIF_VALUE: 16
PIF_VALUE: 20
PIF_VALUE: 2
PIF_VALUE: 20
PIF_VALUE: 20
PIF_VALUE: 19
PIF_VALUE: 21
PIF_VALUE: 21
PIF_VALUE: 20
PIF_VALUE: 20
PIF_VALUE: 21
PIF_VALUE: 15
PIF_VALUE: 20
PIF_VALUE: 19
PIF_VALUE: 20
PIF_VALUE: 1
PIF_VALUE: 20
PIF_VALUE: 16
PIF_VALUE: 16
PIF_VALUE: 20
PIF_VALUE: 21
PIF_VALUE: 20
PIF_VALUE: 21
PIF_VALUE: 21
PIF_VALUE: 20
PIF_VALUE: 18
PIF_VALUE: 20
PIF_VALUE: 19
PIF_VALUE: 19
PIF_VALUE: 17
PIF_VALUE: 20
PIF_VALUE: 20
PIF_VALUE: 21
PIF_VALUE: 16
PIF_VALUE: 19
PIF_VALUE: 20
PIF_VALUE: 22
PIF_VALUE: 19
PIF_VALUE: 21
PIF_VALUE: 19
PIF_VALUE: 20
PIF_VALUE: 16
PIF_VALUE: 21
PIF_VALUE: 21
PIF_VALUE: 16
PIF_VALUE: 3
PIF_VALUE: 21
PIF_VALUE: 1
PIF_VALUE: 20
PIF_VALUE: 22
PIF_VALUE: 23
PIF_VALUE: 21
PIF_VALUE: 20
PIF_VALUE: 23
PIF_VALUE: 20
PIF_VALUE: 21
PIF_VALUE: 16
PIF_VALUE: 19
PIF_VALUE: 21
PIF_VALUE: 20
PIF_VALUE: 21
PIF_VALUE: 23
PIF_VALUE: 20
PIF_VALUE: 20
PIF_VALUE: 26
PIF_VALUE: 21
PIF_VALUE: 17
PIF_VALUE: 24
PIF_VALUE: 17
PIF_VALUE: 20
PIF_VALUE: 1
PIF_VALUE: 21
PIF_VALUE: 16
PIF_VALUE: 21
PIF_VALUE: 17
PIF_VALUE: 20
PIF_VALUE: 21
PIF_VALUE: 21
PIF_VALUE: 20
PIF_VALUE: 20
PIF_VALUE: 21
PIF_VALUE: 16
PIF_VALUE: 20
PIF_VALUE: 21
PIF_VALUE: 20
PIF_VALUE: 21
PIF_VALUE: 21
PIF_VALUE: 20
PIF_VALUE: 21
PIF_VALUE: 20
PIF_VALUE: 20
PIF_VALUE: 23
PIF_VALUE: 21
PIF_VALUE: 20
PIF_VALUE: 22
PIF_VALUE: 16
PIF_VALUE: 21
PIF_VALUE: 22
PIF_VALUE: 20
PIF_VALUE: 20
PIF_VALUE: 1
PIF_VALUE: 20
PIF_VALUE: 21
PIF_VALUE: 20
PIF_VALUE: 19
PIF_VALUE: 20
PIF_VALUE: 21
PIF_VALUE: 16
PIF_VALUE: 20
PIF_VALUE: 4
PIF_VALUE: 20
PIF_VALUE: 19
PIF_VALUE: 19
PIF_VALUE: 20
PIF_VALUE: 20
PIF_VALUE: 15
PIF_VALUE: 21
PIF_VALUE: 1
PIF_VALUE: 20
PIF_VALUE: 21
PIF_VALUE: 20
PIF_VALUE: 20
PIF_VALUE: 19
PIF_VALUE: 2
PIF_VALUE: 16
PIF_VALUE: 20
PIF_VALUE: 23
PIF_VALUE: 20
PIF_VALUE: 20
PIF_VALUE: 16
PIF_VALUE: 20
PIF_VALUE: 20
PIF_VALUE: 17
PIF_VALUE: 19
PIF_VALUE: 21
PIF_VALUE: 16
PIF_VALUE: 19
PIF_VALUE: 20
PIF_VALUE: 21
PIF_VALUE: 19
PIF_VALUE: 21
PIF_VALUE: 20
PIF_VALUE: 20
PIF_VALUE: 19
PIF_VALUE: 21
PIF_VALUE: 21
PIF_VALUE: 20
PIF_VALUE: 19
PIF_VALUE: 21
PIF_VALUE: 21
PIF_VALUE: 20
PIF_VALUE: 25
PIF_VALUE: 20
PIF_VALUE: 21
PIF_VALUE: 19
PIF_VALUE: 23
PIF_VALUE: 19
PIF_VALUE: 21
PIF_VALUE: 19
PIF_VALUE: 20
PIF_VALUE: 19
PIF_VALUE: 23
PIF_VALUE: 20
PIF_VALUE: 21
PIF_VALUE: 21
PIF_VALUE: 20
PIF_VALUE: 21
PIF_VALUE: 23
PIF_VALUE: 20
PIF_VALUE: 20
PIF_VALUE: 21
PIF_VALUE: 20
PIF_VALUE: 21
PIF_VALUE: 20
PIF_VALUE: 19
PIF_VALUE: 20
PIF_VALUE: 1
PIF_VALUE: 19
PIF_VALUE: 16
PIF_VALUE: 2
PIF_VALUE: 20
PIF_VALUE: 20
PIF_VALUE: 16

## 2020-09-02 ASSESSMENT — PAIN DESCRIPTION - DESCRIPTORS
DESCRIPTORS: ACHING;DISCOMFORT
DESCRIPTORS: ACHING;DISCOMFORT
DESCRIPTORS: BURNING
DESCRIPTORS: ACHING;DISCOMFORT

## 2020-09-02 ASSESSMENT — PAIN SCALES - GENERAL
PAINLEVEL_OUTOF10: 9
PAINLEVEL_OUTOF10: 8
PAINLEVEL_OUTOF10: 9
PAINLEVEL_OUTOF10: 2
PAINLEVEL_OUTOF10: 8
PAINLEVEL_OUTOF10: 10
PAINLEVEL_OUTOF10: 5
PAINLEVEL_OUTOF10: 6
PAINLEVEL_OUTOF10: 9
PAINLEVEL_OUTOF10: 2

## 2020-09-02 ASSESSMENT — PAIN DESCRIPTION - FREQUENCY
FREQUENCY: CONTINUOUS

## 2020-09-02 ASSESSMENT — PAIN DESCRIPTION - ORIENTATION
ORIENTATION: LOWER;MID
ORIENTATION: RIGHT;LOWER

## 2020-09-02 ASSESSMENT — PAIN DESCRIPTION - PAIN TYPE
TYPE: CHRONIC PAIN
TYPE: SURGICAL PAIN

## 2020-09-02 ASSESSMENT — PAIN - FUNCTIONAL ASSESSMENT
PAIN_FUNCTIONAL_ASSESSMENT: PREVENTS OR INTERFERES SOME ACTIVE ACTIVITIES AND ADLS

## 2020-09-02 ASSESSMENT — LIFESTYLE VARIABLES: SMOKING_STATUS: 1

## 2020-09-02 ASSESSMENT — PAIN DESCRIPTION - PROGRESSION
CLINICAL_PROGRESSION: NOT CHANGED

## 2020-09-02 ASSESSMENT — PAIN DESCRIPTION - ONSET
ONSET: ON-GOING

## 2020-09-02 ASSESSMENT — PAIN DESCRIPTION - LOCATION
LOCATION: BACK

## 2020-09-02 NOTE — ANESTHESIA PRE PROCEDURE
Department of Anesthesiology  Preprocedure Note       Name:  Rachel Barnes   Age:  30 y.o.  :  1985                                          MRN:  [id number]         Date:  2020      Surgeon: Olman Walker):  Jaki Herring MD    Procedure: Procedure(s):  L4-S1 TLIF   BILATERAL DECOMPRESSION   L5-S1       RIGHT DECOMPRESSION L4-5   NUVASIVE    2 C-ARMS    CELLSAVER    Medications prior to admission:   Prior to Admission medications    Medication Sig Start Date End Date Taking? Authorizing Provider   methotrexate (RHEUMATREX) 2.5 MG chemo tablet Take 20 mg by mouth once a week Wednesday    Historical Provider, MD   traMADol (ULTRAM) 50 MG tablet Take 50 mg by mouth every 4 hours as needed for Pain. Historical Provider, MD   indomethacin (INDOCIN SR) 75 MG extended release capsule Take 75 mg by mouth 2 times daily (with meals)    Historical Provider, MD   OMEPRAZOLE PO Take by mouth as needed     Historical Provider, MD       Current medications:    No current facility-administered medications for this encounter. Allergies: Allergies   Allergen Reactions    Penicillins Hives       Problem List:  There is no problem list on file for this patient. Past Medical History:        Diagnosis Date    Anxiety     Arthritis, rheumatoid ([facility] [address] 2014    Heartburn        Past Surgical History:        Procedure Laterality Date    CARDIAC VALVE SURGERY      as an infant    TONSILLECTOMY         Social History:    Social History     Tobacco Use    Smoking status: Current Every Day Smoker     Packs/day: 1.50     Years: 20.00     Pack years: 30.00     Types: Cigarettes     Start date:     Smokeless tobacco: Never Used   Substance Use Topics    Alcohol use: Yes     Comment: 3-4 shots and 5-6 beers per night                                Ready to quit: Not Answered  Counseling given: Not Answered      Vital Signs (Current): There were no vitals filed for this visit. BP Readings from Last 3 Encounters:   01/28/19 130/84   11/08/18 132/74   09/05/18 (!) 120/90       NPO Status:                                                                                 BMI:   Wt Readings from Last 3 Encounters:   08/18/20 155 lb (70.3 kg)   11/08/18 142 lb (64.4 kg)   09/05/18 133 lb 9.6 oz (60.6 kg)     There is no height or weight on file to calculate BMI.    CBC: No results found for: WBC, RBC, HGB, HCT, MCV, RDW, PLT    CMP: No results found for: NA, K, CL, CO2, BUN, CREATININE, GFRAA, AGRATIO, LABGLOM, GLUCOSE, PROT, CALCIUM, BILITOT, ALKPHOS, AST, ALT    POC Tests: No results for input(s): POCGLU, POCNA, POCK, POCCL, POCBUN, POCHEMO, POCHCT in the last 72 hours. Coags: No results found for: PROTIME, INR, APTT    HCG (If Applicable): No results found for: PREGTESTUR, PREGSERUM, HCG, HCGQUANT     ABGs: No results found for: PHART, PO2ART, TXR6RLW, QLW6ZGN, BEART, P2MTHGUA     Type & Screen (If Applicable):  No results found for: LABABO, LABRH    Drug/Infectious Status (If Applicable):  No results found for: HIV, HEPCAB    COVID-19 Screening (If Applicable):   Lab Results   Component Value Date    COVID19 Not Detected 08/29/2020         Anesthesia Evaluation   no history of anesthetic complications:   Airway: Mallampati: I  TM distance: >3 FB   Neck ROM: full  Mouth opening: > = 3 FB Dental:          Pulmonary:   (+) current smoker                           Cardiovascular:        (-)  angina and  NAZARIO       Beta Blocker:  Not on Beta Blocker         Neuro/Psych:   (+) depression/anxiety             GI/Hepatic/Renal:   (+) GERD: well controlled,           Endo/Other:    (+) : arthritis: rheumatoid. , .                 Abdominal:           Vascular:                                      Anesthesia Plan      general     ASA 3       Induction: intravenous. Anesthetic plan and risks discussed with patient.                       Cinda Booth MD   9/2/2020

## 2020-09-02 NOTE — H&P
CO2 25   BUN 11   CREATININE 0.58*   GLUCOSE 106*   INR 0.9   PROTIME 12.1   APTT 29.9       Recent Labs     08/29/20  1400   COVID19 Not Detected       Brown Denson  APRN, ANP-BC  Electronically signed 9/2/2020 at 10:17 AM

## 2020-09-02 NOTE — BRIEF OP NOTE
Brief Postoperative Note      Patient: Rose Rodriguez  YOB: 1985  MRN: [id number]    Date of Procedure: 9/2/2020    Pre-Op Diagnosis: DX LUMBAR SPONDYLOLITHESIS, STENOSIS L4-S1    Post-Op Diagnosis: Same       Procedure(s):  L4-S1 TLIF  WITH POSTEROLATERAL FUSION  BILATERAL DECOMPRESSION   L5-S1       RIGHT DECOMPRESSION L4-5   NUVASIVE    2 C-ARMS    CELLSAVER    Surgeon(s):  Vicky Victoria MD    Assistant:  First Assistant: Marielos Martinez    Anesthesia: General    Estimated Blood Loss (mL): 400ML, RETURN 250ML CELL SAVER    Complications: None    Specimens:   * No specimens in log *    Implants:  Implant Name Type Inv. Item Serial No.  Lot No. LRB No. Used Action   JOSSELINE-TISSUE CANCELLOUS 30. Mandi Stormy - [id number] Bone/Graft/Tissue/Human/Synth JOSSELINE-TISSUE CANCELLOUS 30. Mandi Burrows [id number] [facility]  N/A 1 Implanted   JOSSELINE-PUTTY PROGENIX PLUS 5CC Bone/Graft/Tissue/Human/Synth JOSSELINE-PUTTY PROGENIX PLUS 5CC  [employer] [id number] N/A 1 Implanted   SCREW RELINE MAS 6.5X50MM 2C PLYAXL Spine SCREW RELINE MAS 6.5X50MM 2C PLYAXL  [employer]  N/A 1 Implanted   SCREW RELINE MAS MOD SHANK 6.5X40MM 2C Spine SCREW RELINE MAS MOD SHANK 6.5X40MM 2C  [employer]  N/A 2 Implanted   SCREW RELINE MAS MOD SHANK 6.5X45MM 2C Spine SCREW RELINE MAS MOD SHANK 6.5X45MM 2C  [employer]  N/A 1 Implanted   SCREW RELINE MAS MOD SHANK 6.5X50MM 2C Spine SCREW RELINE MAS MOD SHANK 6.5X50MM 2C  [employer]  N/A 2 Implanted   IMPL RELINE TULIP MODULAR Spine IMPL RELINE TULIP MODULAR  [employer]  N/A 5 Implanted   SCREW LK RELINE OPN TULIP 5.5MM Spine SCREW LK RELINE OPN TULIP 5.5MM  [employer]  N/A 6 Implanted   IMPL SPINE TLIF OBLIQUE [id number] 4DEG Spine IMPL SPINE TLIF OBLIQUE [id number] 4DEG  [employer]  N/A 1 Implanted   IMPL SPINE TLIF OBLIQUE [id number] 4DEG Spine IMPL SPINE TLIF OBLIQUE [id number] 4DEG  [employer]  N/A 1 Implanted   BELKYS RELINE MAS TI LORDOTIC 5.5X55MM Spine BELKYS RELINE MAS TI LORDOTIC 5.5X55MM  [employer]  N/A 2 Implanted         Drains:   Urethral Catheter Double-lumen;Non-latex;Straight-tip 16 fr (Active)         Electronically signed by Skylar Otero MD on 9/2/2020 at 3:22 PM

## 2020-09-02 NOTE — PROGRESS NOTES
Learning About the Safe Use of Antibiotics  Introduction  Antibiotics are drugs used to kill bacteria. Bacteria can cause infections. These include strep throat, ear infections, and pneumonia. These medicines can't cure everything. They don't kill viruses or help with allergies. They don't help illnesses such as the common cold, the flu, or a runny nose. And they can cause side effects. There are many types of antibiotics. Your doctor will decide which one will work best for your infection. Examples include:  · Amoxicillin. · Cephalexin (Keflex). · Ciprofloxacin (Cipro). What are the possible side effects? Side effects can include:  · Nausea. · Diarrhea. · Skin rash. · Yeast infection. · A severe allergic reaction. It may cause itching, swelling, and breathing problems. This is rare. You may have other side effects or reactions not listed here. Check the information that comes with your medicine. Should you take antibiotics just in case? Don't take antibiotics when you don't need them. If you do that, they may not work when you do need them. Each time you take antibiotics, you are more likely to have some bacteria that survive and aren't killed by the medicine. Bacteria that don't die can change and become even harder to kill. These are called antibiotic-resistant bacteria. They can cause longer and more serious infections. To treat them, you may need different, stronger antibiotics that have more side effects and may cost more. So always ask your doctor if antibiotics are the best treatment. Explain that you do not want antibiotics unless you need them. Help protect the community  Using antibiotics when they're not needed leads to the development of antibiotic-resistant bacteria. These tougher bacteria can spread to family members, children, and coworkers. People in your community will have a risk of getting an infection that is harder to cure and that costs more to treat.   How can you take antibiotics safely? Be safe with medicine. Take your antibiotics as directed. Do not stop taking them just because you feel better. You need to take the full course of medicine. This will help make sure your infection is cured. It will also help prevent the growth of antibiotic-resistant bacteria. Always take the exact amount that the label says to take. If the label says to take the medicine at a certain time, follow those directions. You might feel better after you take an antibiotic for a few days. But it is important to keep taking it for as long as prescribed. That will help you get rid of those bacteria that are a bit stronger and that survive the first few days of treatment. Where can you learn more? Go to https://Cerapedics.Dely. org and sign in to your InstantQuest account. Enter Y635 in the [employer] box to learn more about \"Learning About the Safe Use of Antibiotics. \"     If you do not have an account, please click on the \"Sign Up Now\" link. Current as of: March 3, 2017  Content Version: 11.3  © 5776-1561 [employer]. Care instructions adapted under license by [facility] ([facility]). If you have questions about a medical condition or this instruction, always ask your healthcare professional. [address] any warranty or liability for your use of this information. Antibiotics are powerful drugs that are generally safe and very helpful in fighting disease, but there are times when antibiotics can actually be harmful. Antibiotics can have side effects, including allergic reactions and a potentially deadly diarrhea caused by the bacteria Clostridium difficile (C. diff). Antibiotics can also interfere with the action of other drugs a patient may be taking for another condition. These unintended reactions to antibiotics are called adverse drug events.    When someone takes an antibiotic that they do not need, they are needlessly exposed to the side about drug interactions and the potential side effects of antibiotics.  The doctor should be told immediately if a patient has any side effects from antibiotics  Page last updated: February 24, 2017 Content source:   Centers for Disease Control and [employer] for Emerging and Zoonotic Infectious Diseases (Horace Cazares)  Division of Healthcare Quality Promotion [facility], [place])

## 2020-09-02 NOTE — FLOWSHEET NOTE
Patient is awake and alert while sitting up in bed. Patient is approachable and engages in conversation. Patient reports that he is in pain and is awaiting something from the pharmacy. Patient does not desire any spiritual or emotional support at this time. Spiritual Care will follow as needed. 09/02/20 1900   Encounter Summary   Services provided to: Patient   Referral/Consult From: [facility] Family members   Continue Visiting   (9/2/20)   Complexity of Encounter Low   Length of Encounter 15 minutes   Routine   Type Initial   Assessment Calm; Anxious   Intervention Active listening   Outcome Expressed gratitude

## 2020-09-02 NOTE — ANESTHESIA POSTPROCEDURE EVALUATION
Department of Anesthesiology  Postprocedure Note    Patient: Rachid Baldwin  MRN: [id number]  YOB: 1985  Date of evaluation: 9/2/2020  Time:  7:56 PM     Procedure Summary     Date:  09/02/20 Room / Location:  [address] [place] [address]    Anesthesia Start:  3478 Anesthesia Stop:  4633    Procedure:  L4-S1 TLIF   BILATERAL DECOMPRESSION   L5-S1       RIGHT DECOMPRESSION L4-5   Leliot Sreekanth    2 C-ARMS    CELLSAVER (Bilateral Back) Diagnosis:  (DX LUMBAR SPONDYLOLITHESIS)    Surgeon:  Kirstin Lund MD Responsible Provider:  Jay Whelan DO    Anesthesia Type:  general ASA Status:  3          Anesthesia Type: general    Evelyn Phase I:      Evelyn Phase II:      Last vitals: Reviewed and per EMR flowsheets.        Anesthesia Post Evaluation    Patient location during evaluation: PACU  Patient participation: complete - patient participated  Level of consciousness: awake and alert  Airway patency: patent  Nausea & Vomiting: no nausea and no vomiting  Complications: no  Cardiovascular status: hemodynamically stable  Respiratory status: acceptable  Hydration status: stable

## 2020-09-03 VITALS
HEART RATE: 101 BPM | RESPIRATION RATE: 18 BRPM | OXYGEN SATURATION: 94 % | WEIGHT: 155 LBS | DIASTOLIC BLOOD PRESSURE: 83 MMHG | TEMPERATURE: 97.7 F | BODY MASS INDEX: 25.83 KG/M2 | HEIGHT: 65 IN | SYSTOLIC BLOOD PRESSURE: 139 MMHG

## 2020-09-03 LAB
ANION GAP SERPL CALCULATED.3IONS-SCNC: 14 MMOL/L (ref 9–17)
BUN BLDV-MCNC: 8 MG/DL (ref 6–20)
BUN/CREAT BLD: 13 (ref 9–20)
CALCIUM SERPL-MCNC: 9.6 MG/DL (ref 8.6–10.4)
CHLORIDE BLD-SCNC: 100 MMOL/L (ref 98–107)
CO2: 23 MMOL/L (ref 20–31)
CREAT SERPL-MCNC: 0.62 MG/DL (ref 0.7–1.2)
CULTURE: NO GROWTH
EKG ATRIAL RATE: 76 BPM
EKG P AXIS: 80 DEGREES
EKG P-R INTERVAL: 128 MS
EKG Q-T INTERVAL: 392 MS
EKG QRS DURATION: 96 MS
EKG QTC CALCULATION (BAZETT): 441 MS
EKG R AXIS: 35 DEGREES
EKG T AXIS: 50 DEGREES
EKG VENTRICULAR RATE: 76 BPM
GFR AFRICAN AMERICAN: >60 ML/MIN
GFR NON-AFRICAN AMERICAN: >60 ML/MIN
GFR SERPL CREATININE-BSD FRML MDRD: ABNORMAL ML/MIN/{1.73_M2}
GFR SERPL CREATININE-BSD FRML MDRD: ABNORMAL ML/MIN/{1.73_M2}
GLUCOSE BLD-MCNC: 175 MG/DL (ref 70–99)
HCT VFR BLD CALC: 41.1 % (ref 40.7–50.3)
HEMOGLOBIN: 14 G/DL (ref 13–17)
Lab: NORMAL
MCH RBC QN AUTO: 33.6 PG (ref 25.2–33.5)
MCHC RBC AUTO-ENTMCNC: 34.1 G/DL (ref 28.4–34.8)
MCV RBC AUTO: 98.6 FL (ref 82.6–102.9)
NRBC AUTOMATED: 0 PER 100 WBC
PDW BLD-RTO: 12.9 % (ref 11.8–14.4)
PLATELET # BLD: 225 K/UL (ref 138–453)
PMV BLD AUTO: 10.3 FL (ref 8.1–13.5)
POTASSIUM SERPL-SCNC: 4.3 MMOL/L (ref 3.7–5.3)
RBC # BLD: 4.17 M/UL (ref 4.21–5.77)
SODIUM BLD-SCNC: 137 MMOL/L (ref 135–144)
SPECIMEN DESCRIPTION: NORMAL
WBC # BLD: 11 K/UL (ref 3.5–11.3)

## 2020-09-03 PROCEDURE — 36415 COLL VENOUS BLD VENIPUNCTURE: CPT

## 2020-09-03 PROCEDURE — 97162 PT EVAL MOD COMPLEX 30 MIN: CPT

## 2020-09-03 PROCEDURE — 80048 BASIC METABOLIC PNL TOTAL CA: CPT

## 2020-09-03 PROCEDURE — 2500000003 HC RX 250 WO HCPCS: Performed by: ORTHOPAEDIC SURGERY

## 2020-09-03 PROCEDURE — 6370000000 HC RX 637 (ALT 250 FOR IP): Performed by: ORTHOPAEDIC SURGERY

## 2020-09-03 PROCEDURE — 97116 GAIT TRAINING THERAPY: CPT

## 2020-09-03 PROCEDURE — 6360000002 HC RX W HCPCS: Performed by: ORTHOPAEDIC SURGERY

## 2020-09-03 PROCEDURE — 85027 COMPLETE CBC AUTOMATED: CPT

## 2020-09-03 RX ORDER — TIZANIDINE 4 MG/1
4 TABLET ORAL EVERY 8 HOURS PRN
Qty: 50 TABLET | Refills: 0 | Status: SHIPPED | OUTPATIENT
Start: 2020-09-03 | End: 2022-06-23

## 2020-09-03 RX ORDER — OXYCODONE HYDROCHLORIDE AND ACETAMINOPHEN 5; 325 MG/1; MG/1
1-2 TABLET ORAL EVERY 4 HOURS PRN
Qty: 60 TABLET | Refills: 0 | Status: SHIPPED | OUTPATIENT
Start: 2020-09-03 | End: 2020-09-10

## 2020-09-03 RX ORDER — SENNA AND DOCUSATE SODIUM 50; 8.6 MG/1; MG/1
1 TABLET, FILM COATED ORAL 2 TIMES DAILY
Qty: 60 TABLET | Refills: 0 | Status: SHIPPED | OUTPATIENT
Start: 2020-09-03 | End: 2022-06-23

## 2020-09-03 RX ORDER — MORPHINE SULFATE 15 MG/1
15 TABLET, FILM COATED, EXTENDED RELEASE ORAL EVERY 12 HOURS SCHEDULED
Qty: 6 TABLET | Refills: 0 | Status: SHIPPED | OUTPATIENT
Start: 2020-09-03 | End: 2020-09-06

## 2020-09-03 RX ADMIN — PANTOPRAZOLE SODIUM 40 MG: 40 TABLET, DELAYED RELEASE ORAL at 06:09

## 2020-09-03 RX ADMIN — OXYCODONE HYDROCHLORIDE AND ACETAMINOPHEN 2 TABLET: 5; 325 TABLET ORAL at 07:16

## 2020-09-03 RX ADMIN — MORPHINE SULFATE 15 MG: 15 TABLET, FILM COATED, EXTENDED RELEASE ORAL at 08:12

## 2020-09-03 RX ADMIN — POLYETHYLENE GLYCOL 3350 17 G: 17 POWDER, FOR SOLUTION ORAL at 08:13

## 2020-09-03 RX ADMIN — SENNOSIDES AND DOCUSATE SODIUM 1 TABLET: 8.6; 5 TABLET ORAL at 08:12

## 2020-09-03 RX ADMIN — HYDROMORPHONE HYDROCHLORIDE 0.5 MG: 1 INJECTION, SOLUTION INTRAMUSCULAR; INTRAVENOUS; SUBCUTANEOUS at 00:45

## 2020-09-03 RX ADMIN — OXYCODONE HYDROCHLORIDE AND ACETAMINOPHEN 2 TABLET: 5; 325 TABLET ORAL at 03:15

## 2020-09-03 RX ADMIN — DEXAMETHASONE SODIUM PHOSPHATE 6 MG: 10 INJECTION INTRAMUSCULAR; INTRAVENOUS at 02:29

## 2020-09-03 RX ADMIN — CLINDAMYCIN PHOSPHATE 900 MG: 900 INJECTION, SOLUTION INTRAVENOUS at 02:29

## 2020-09-03 ASSESSMENT — PAIN DESCRIPTION - PAIN TYPE
TYPE: SURGICAL PAIN
TYPE: SURGICAL PAIN

## 2020-09-03 ASSESSMENT — PAIN SCALES - GENERAL
PAINLEVEL_OUTOF10: 7
PAINLEVEL_OUTOF10: 5
PAINLEVEL_OUTOF10: 8
PAINLEVEL_OUTOF10: 7

## 2020-09-03 ASSESSMENT — PAIN DESCRIPTION - FREQUENCY
FREQUENCY: CONTINUOUS
FREQUENCY: CONTINUOUS

## 2020-09-03 ASSESSMENT — PAIN DESCRIPTION - LOCATION
LOCATION: BACK
LOCATION: BACK

## 2020-09-03 ASSESSMENT — PAIN DESCRIPTION - ONSET
ONSET: ON-GOING
ONSET: ON-GOING

## 2020-09-03 ASSESSMENT — PAIN DESCRIPTION - ORIENTATION
ORIENTATION: LOWER;MID
ORIENTATION: LOWER;MID

## 2020-09-03 ASSESSMENT — PAIN - FUNCTIONAL ASSESSMENT
PAIN_FUNCTIONAL_ASSESSMENT: PREVENTS OR INTERFERES SOME ACTIVE ACTIVITIES AND ADLS
PAIN_FUNCTIONAL_ASSESSMENT: PREVENTS OR INTERFERES SOME ACTIVE ACTIVITIES AND ADLS

## 2020-09-03 ASSESSMENT — PAIN DESCRIPTION - PROGRESSION
CLINICAL_PROGRESSION: NOT CHANGED
CLINICAL_PROGRESSION: NOT CHANGED

## 2020-09-03 ASSESSMENT — PAIN DESCRIPTION - DESCRIPTORS
DESCRIPTORS: ACHING;DISCOMFORT
DESCRIPTORS: ACHING;DISCOMFORT

## 2020-09-03 NOTE — CARE COORDINATION
Case Management Initial Discharge Plan  Debbie Chou Dzagulones,             Met with:patient to discuss discharge plans. Information verified: address, contacts, phone number, , insurance Yes  PCP: Anahi Mcleod PA-C  Date of last visit: years ago    Insurance Provider: [facility] [place]    Discharge Planning    Living Arrangements:  Alone   Support Systems:  Family Members    Home has 3 story apt  24 stairs to climb to get into front door, 24 stairs to climb to reach third floor  Location of bedroom/bathroom in home  - main floor of apt    Patient able to perform ADL's:Independent    Current Services (outpatient & in home) none  DME equipment: none  DME provider: N/A    Pharmacy: [facility] [address]    Potential Assistance Needed:  Ashley [place]    Patient agreeable to home care: No  [place] of choice provided:  n/a    Prior SNF/Rehab Placement and Facility: No  Agreeable to SNF/Rehab: No  [place] of choice provided: n/a   Evaluation: n/a    Expected Discharge date:  10/14/20  Patient expects to be discharged to:  home  Follow Up Appointment: Best Day/ Time: Tuesday PM    Transportation provider: friend  Transportation arrangements needed for discharge: No    Readmission Risk              Risk of Unplanned Readmission:        8             Does patient have a readmission risk score greater than 14?: No  If yes, follow-up appointment must be made within 7 days of discharge. Goal of Care:       Discharge Plan: Met with patient at bedside. Lives alone, independent and works full time. POD #1 lumbar fusion. Spoke to Mary Atkinson at [employer] requesting walker. Face sheet, script and progress note faxed. Pt anxious to discharge home soon and walker will be delivered to patients apt. Post op appointment with Dr. Beth Tanner is 20 at 11am and pt informed.             Electronically signed by Joseph Najera RN on 9/3/20 at 8:27 AM EDT

## 2020-09-03 NOTE — PROGRESS NOTES
Patient ambulated around the nurses station 4 times. Tolerated very well. Patient is anxious to go home.

## 2020-09-03 NOTE — PROGRESS NOTES
Physical Therapy    Facility/Department: [facility]  Initial Assessment    NAME: Rachel Barnes  : 1985  MRN: [id number]    Date of Service: 9/3/2020    Discharge Recommendations:  Home with assist PRN     Pt presented to surgery on 20 for:  1. L5-S1 minimally invasive transforaminal lumbar interbody  fusion with posterior spinal fusion. 2. L4-L5 minimally invasive transforaminal lumbar interbody  fusion with posterior spinal fusion. 3. Insertion of interbody cages for spinal fusion at L4-L5 and  L5-S1.  4. Right L4 Dhaliwal laminectomy for spinal decompression. 5. Right L5 Dhaliwal laminectomy for spinal decompression. 6. Insertion of posterior spinal instrumentation from L4 to S1,  which is a 2 level instrumentation utilizing NuVasive Reline  pedicle screws and rods. 7. [place] of local bone for spinal lumbar. 8. Use of allograft bone for spinal fusion to include DBM   and crushed cancellous allograft bone secondary L4-S1 spondylolisthesis with foraminal stenosis and degenerative disc disease           RN reports patient is medically stable for therapy treatment this date. Chart reviewed prior to treatment and patient is agreeable for therapy. Assessment   Assessment: Pt tolerated treatment well and was able to amb 200ft without a device and performed 6 steps without difficulty.    Pt Ed on spinal precautions, safety & energy principles, prevention of sedentary complications & home walking program. Pt issued written pt education  Pt is safe to D/C home & progress to OP PT for reconditioing when appropriate by Dr Iglesia Munguia    Prognosis: Excellent  Exam: ROM, MMT, functional mobility, activity tolerance, Balance, & [employer] AM-PAC 6 Clicks Basic Mobility  Clinical Presentation: stable  PT Education: Transfer Training;Functional Mobility Training;Home Exercise Program;General Safety;Gait Training  Patient Education: Pt issued spinal precautions handout, verbalized how to do a log roll : Yes  Mode of Transportation: Car  Occupation: Full time employment  Type of occupation: [place] [employer] [address]: Play pool, basketball, cornhole  Additional Comments: Pt denies any recent falls. Cognition   Cognition  Overall Cognitive Status: WFL    Objective     Observation/Palpation  Posture: Good  Observation: IV LUE, pt sitting on EOB    AROM RLE (degrees)  RLE AROM: WFL  AROM LLE (degrees)  LLE AROM : WFL  Strength RLE  Strength RLE: WFL  Strength LLE  Strength LLE: WFL  Tone RLE  RLE Tone: Normotonic  Tone LLE  LLE Tone: Normotonic  Sensation  Overall Sensation Status: WNL  Bed mobility  Sit to Supine: Unable to assess  Comment: Pt was sitting on EOB upon arrival and ended at EOB. Pt was ed on proper log technique. Transfers  Sit to Stand: Supervision  Stand to sit: Supervision  Ambulation  Ambulation?: Yes  Ambulation 1  Surface: level tile  Device: No Device(IV pole)  Other Apparatus: (lumbar corset)  Assistance: Supervision  Quality of Gait: step through pattern  Distance: 200ft  Stairs/Curb  Stairs?: Yes  Stairs  # Steps : 6  Rails: None     Balance  Posture: Good  Sitting - Static: Good  Sitting - Dynamic: Good  Standing - Static: Good  Standing - Dynamic: Good     All lines intact, call light within reach, and patient positioned comfortably at end of treatment. All patient needs addressed prior to ending therapy session.           Plan   Plan  Times per week: D/C from PT  Current Treatment Recommendations: Transfer Training, Endurance Training, Gait Training, Functional Mobility Training, Stair training, Safety Education & Training, Home Exercise Program  Safety Devices  Type of devices: Gait belt, Call light within reach, Nurse notified, Left in bed    G-Code       OutComes Score                                                  AM-PAC Score  AM-PAC Inpatient Mobility Raw Score : 24 (09/03/20 0859)  AM-PAC Inpatient T-Scale Score : 61.14 (09/03/20 0859)  Mobility Inpatient

## 2020-09-03 NOTE — PROGRESS NOTES
[facility]  Attending Progress Note  9/3/2020  7:54 AM     Lupe Corralagulyessenia    1985   [id number]      SUBJECTIVE:  Doing well. Pain controlled. Has been up walking well. Devine out - voided. Denies leg symptoms. No CP/SOB    OBJECTIVE      Physical      VITALS:  /83   Pulse 101   Temp 97.7 °F (36.5 °C) (Oral)   Resp 18   Ht 5' 5\" (1.651 m)   Wt 155 lb (70.3 kg)   SpO2 94%   BMI 25.79 kg/m²     Dressing C/D/I    NEUROLOGIC: Alert and Oriented x 3. Strength 5/5 HF, 5/5 Q, 5/5 TA, 5/5 EHL, 5/5 GS. 5/5 D, 5/5 B, 5/5 T, 5/5 WE, 5/5 WF, 5/5 I                                                                  Sensation intact.      Data  CBC:   Lab Results   Component Value Date    WBC 11.0 09/03/2020    RBC 4.17 09/03/2020    HGB 14.0 09/03/2020    HCT 41.1 09/03/2020    MCV 98.6 09/03/2020    MCH 33.6 09/03/2020    MCHC 34.1 09/03/2020    RDW 12.9 09/03/2020     09/03/2020    MPV 10.3 09/03/2020     BMP:    Lab Results   Component Value Date     09/03/2020    K 4.3 09/03/2020     09/03/2020    CO2 23 09/03/2020    BUN 8 09/03/2020    CREATININE 0.62 09/03/2020    CALCIUM 9.6 09/03/2020    GFRAA >60 09/03/2020    LABGLOM >60 09/03/2020    GLUCOSE 175 09/03/2020           Current Inpatient Medications    Current Facility-Administered Medications: 0.9 % sodium chloride infusion, , Intravenous, Continuous  sodium chloride flush 0.9 % injection 10 mL, 10 mL, Intravenous, 2 times per day  sodium chloride flush 0.9 % injection 10 mL, 10 mL, Intravenous, PRN  HYDROmorphone (DILAUDID) injection 0.25 mg, 0.25 mg, Intravenous, Q3H PRN **OR** HYDROmorphone (DILAUDID) injection 0.5 mg, 0.5 mg, Intravenous, Q3H PRN  polyethylene glycol (GLYCOLAX) packet 17 g, 17 g, Oral, Daily  sennosides-docusate sodium (SENOKOT-S) 8.6-50 MG tablet 1 tablet, 1 tablet, Oral, BID  promethazine (PHENERGAN) tablet 12.5 mg, 12.5 mg, Oral, Q6H PRN **OR** ondansetron (ZOFRAN) injection 4 mg, 4 mg, Intravenous, Q6H PRN  tiZANidine (ZANAFLEX) tablet 4 mg, 4 mg, Oral, Q8H PRN  morphine (MS CONTIN) extended release tablet 15 mg, 15 mg, Oral, 2 times per day  dexamethasone (DECADRON) injection 6 mg, 6 mg, Intravenous, Q8H  oxyCODONE-acetaminophen (PERCOCET) 5-325 MG per tablet 1 tablet, 1 tablet, Oral, Q4H PRN **OR** oxyCODONE-acetaminophen (PERCOCET) 5-325 MG per tablet 2 tablet, 2 tablet, Oral, Q4H PRN  pantoprazole (PROTONIX) tablet 40 mg, 40 mg, Oral, QAM AC  nicotine (NICODERM CQ) 14 MG/24HR 1 patch, 1 patch, Transdermal, Daily  diphenhydrAMINE (BENADRYL) tablet 25 mg, 25 mg, Oral, Q6H PRN    ASSESSMENT AND PLAN    30 y.o. male status post L4-S1 MIS TLIF/PSFI  post op day #  1    1. PT- WBAT  2. Pain control  3. EPC  4. D/C plan for home today  5.  Will need walker for home due to gait instability after spine surgery      Aruna Gibbs MD  [facility]  Spine Surgeon  [phone]

## 2020-09-03 NOTE — OP NOTE
Operative Note      Patient: Perla Solorzano  YOB: 1985  MRN: [id number]    Date of Procedure: 9/2/2020    PREOPERATIVE DIAGNOSIS: L4-S1 spondylolisthesis with foraminal stenosis and degenerative disc disease    POSTOPERATIVE DIAGNOSIS: L4-S1 spondylolisthesis with foraminal stenosis and degenerative disc disease    PROCEDURE:  1. L5-S1 minimally invasive transforaminal lumbar interbody  fusion with posterior spinal fusion. 2. L4-L5 minimally invasive transforaminal lumbar interbody  fusion with posterior spinal fusion. 3. Insertion of interbody cages for spinal fusion at L4-L5 and  L5-S1.  4. Right L4 Dhaliwal laminectomy for spinal decompression. 5. Right L5 Dhaliwal laminectomy for spinal decompression. 6. Insertion of posterior spinal instrumentation from L4 to S1,  which is a 2 level instrumentation utilizing NuVasive Reline  pedicle screws and rods. 7. [place] of local bone for spinal lumbar. 8. Use of allograft bone for spinal fusion to include DBM   and crushed cancellous allograft bone. 9. Intraoperative use of C-arm fluoroscopy. Surgeon(s):  El Walker MD    Assistant:   First Assistant: Luca Álvarez    Anesthesia: General    Estimated Blood Loss (mL): 400ml, return 250ml cell saver    Complications: None    Specimens:   * No specimens in log *    Implants:  Implant Name Type Inv. Item Serial No.  Lot No. LRB No. Used Action   JOSSELINE-TISSUE CANCELLOUS 30. Daija Stephen - [id number] Bone/Graft/Tissue/Human/Synth JOSSELINE-TISSUE CANCELLOUS 30. Daija Mitchell [id number] [employer]UN[employer]ISS[employer]  N/A 1 Implanted   JOSSELINE-PUTTY PROGENIX PLUS 5CC Bone/Graft/Tissue/Human/Synth JOSSELINE-PUTTY PROGENIX PLUS 5CC  [employer] [id number] N/A 1 Implanted   SCREW RELINE MAS 6.5X50MM 2C PLYAXL Spine SCREW RELINE MAS 6.5X50MM 2C PLYAXL  [employer]  N/A 1 Implanted   SCREW RELINE MAS MOD SHANK 6.5X40MM 2C Spine SCREW RELINE MAS MOD SHANK 6.5X40MM 2C  [employer]  N/A 2 Implanted   SCREW RELINE MAS MOD SHANK 6.5X45MM 2C Spine SCREW RELINE MAS MOD SHANK 6.5X45MM 2C  [employer]  N/A 1 Implanted   SCREW RELINE MAS MOD SHANK 6.5X50MM 2C Spine SCREW RELINE MAS MOD SHANK 6.5X50MM 2C  [employer]  N/A 2 Implanted   IMPL RELINE TULIP MODULAR Spine IMPL RELINE TULIP MODULAR  [employer]  N/A 5 Implanted   SCREW LK RELINE OPN TULIP 5.5MM Spine SCREW LK RELINE OPN TULIP 5.5MM  [employer]  N/A 6 Implanted   IMPL SPINE TLIF OBLIQUE [id number] 4DEG Spine IMPL SPINE TLIF OBLIQUE [id number] 4DEG  [employer]  N/A 1 Implanted   IMPL SPINE TLIF OBLIQUE [id number] 4DEG Spine IMPL SPINE TLIF OBLIQUE [id number] 4DEG  [employer]  N/A 1 Implanted   BELKYS RELINE MAS TI LORDOTIC 5.5X55MM Spine BELKYS RELINE MAS TI LORDOTIC 5.5X55MM  [employer]  N/A 2 Implanted         Drains:   Urethral Catheter Double-lumen;Non-latex;Straight-tip 16 fr (Active)   Catheter Indications Perioperative use in selected surgeries including but not limited to urologic, pelvic or need for intraoperative monitoring of urinary output due to prolonged surgery, large volume infusion or need for diuretic therapy in surgery 09/02/20 1830   Securement Device Date Changed 09/02/20 09/02/20 1530   Site Assessment No urethral drainage 09/02/20 1830   Urine Color Yellow 09/02/20 1830   Urine Appearance Clear 09/02/20 1830   Output (mL) 75 mL 09/02/20 1830       Detailed Description of Procedure:       INDICATIONS: This is a pleasant 80-year-old male with a  longstanding history of significant back pain as well as pain  radiating down his bilateral lower extremities with right >left. He had done extensive  conservative management to physical therapy, medications, pain  management, all with minimal benefit. MRI and x-rays were  performed, which did show significant retrolisthesis at L4-L5  and grade 2/3 spondylolisthesis at L5-S1 consistent with his symptoms.  Due to his failure of conservative management, it was discussed with him the option of  performing a decompression and fusion and attempt to alleviate  his symptoms. Risks were discussed including bleeding,  infection, injury to nerves, vessels, anesthetic risk, the need  for possible further future surgery, as well as the possibility  for continued pain, continued symptoms, possible nonunion, and  possible dural tear. He did understand all these risks, did  wish to proceed, and informed consent was obtained. DESCRIPTION OF PROCEDURE: The patient was taken to the operating  room and kept supine on the bed. He was intubated and placed  under general anesthesia by anesthesiologist. He was given  preoperative antibiotic prophylaxis. Neuromonitoring leads were  placed in his bilateral lower extremities and Devine catheter was  placed. He was then placed prone on the Binnie Leonel table. All  bony prominences were padded. Eyes were kept free of any  pressure and brachial plexus and elbows were padded as well. At  this point, the back was then prepped and draped in a sterile  fashion. Biplanar C-arm fluoroscopy was then brought in and  localized over the L4-S1 region. At this point, the pedicles  were then marked with a marking pen. The 0.5% Marcaine with  epinephrine was used to infiltrate the skin and subcutaneous  tissues. Approximately 4 cm incisions were made lateral to the  pedicles in a Audrey approach bilateral L4-S1 region and  dissection was carried down to the lumbar fascia. At this point,  Jamshidi needles were used to place percutaneous screws in a  standard fashion with needles being placed under C-arm guidance  with neuromonitoring. Neuromonitoring did remain stable  throughout all placement. Screws were placed after guidewires  were placed at L4-L5 and S1 bilaterally. Guidewires were then  subsequently removed. At this point, the retractor was then  assembled at the right L5-S1 level and it was attached to the  table arm. At this point, the medial blade was inserted.  The L5-  S1 facet joint was exposed and osteotome was used to remove the  inferior articular process of L5 and this bone was harvested for  later use. The full Dhaliwal laminectomy and decompression was then  complete completed with a bur with a bone trap as well as the  Kerrisons and curettes to remove the remainder of the bone as  well as the ligamentum flavum for full Dhaliwal laminectomy, and full  decompression to include the exiting L5 and traversing S1 nerve  roots. Once this was done, attention was then turned to the disk  space. The nerve root retractor was placed. Disk space was  incised. The disk was then partially removed. The sizing was  then performed, which showed that an 12 x 10 x 25 mm 4-degree cage  would be appropriate. The full disk space prep was completed,  including removing the cartilaginous endplates. Once this was  done, the bone graft was then placed, which was the local bone  mixed with the [facility] allograft bone. This was packed into  the disk space as well as into the cage. The cage was then  inserted under C-arm guidance until it was fully seated at the L5-  S1 interspace. Once this was done, Maninder Grullon was used to verify  that the nerve roots were completely freed which they were. Attention was then turned to the L4-L5 level. Retractor was then  placed at the L4-L5 level and attached to the table arm and the  exact same fashion as done before the Dhaliwal laminectomy was  completed at the L4-L5 level exposing the dura and exiting L4 and  traversing L5 nerve roots until everything was fully  decompressed. Once decompression was complete, attention was  turned to the disk space. Disk was then incised. The disk was incised and partial diskectomy was performed. The sizers were then utilized and was found that a 12 x 10 x 30  mm. The cage would be appropriate with 4 degrees of lordosis. The remainder of the disk space was then fully prepped as well  removed with the cartilaginous endplates.  The bone graft was  then placed into the disk space utilizing the Osteocel  allograft bone as well as the local bone, which was harvested. Cage was then inserted in a standard fashion as well at the L4-L5  level and under C-arm guidance until it was fully seated. At  this point, the epidural hemostasis was achieved with Gel-Foam  with thrombin and neuro patties and bipolar. At this point, the  retractor was then reassembled from L4 through S1 and the  intervening blade at L5 was removed. The posterolateral  dissection was then performed exposing the transverse processes  of L4, L5 and the sacral ala. These areas were then burred. The  wound was then irrigated with sterile normal saline with  bacitracin. The remainder of the bone graft was then placed with  the crushed cancellous and remainder of the DBM into the  posterolateral gutter from L4-S1 for posterolateral fusion. Once  this was done, the Angelique Saul was used to verify that the  decompression was complete, which it was. The shims were removed  from the remainder of the screw heads. The screw heads with  tower devices were then placed at L4-L5 and S1 and the retractor  was then removed. At this point, the screwdriver was used to  fully seat the screws at L4-S1. The rods were then measured and  bent with lordosis, were then placed through the tower devices  bilaterally and reduction instrumentation was used to reduce the  rods as well as the spondylolisthesis and the set screws were  then placed and subsequently final tightened. Once this was  done, C-arm was brought in and confirmed the rods to be of  excellent length. The tower devices were removed as well as the  rudy inserters and final pictures were taken, which showed  instrumentation from L4-S1 to be in excellent position with  partial reduction of the spondylolisthesis at L4-L5 and L5-S1. The wounds were then irrigated with sterile normal saline  followed by placement of vancomycin powder.  Closure with 0  Vicryl, 2-0 Vicryl, and a running 4-0 Monocryl

## 2020-09-03 NOTE — PROGRESS NOTES
CLINICAL PHARMACY NOTE: MEDS TO [address] Select Patient?: No  Total # of Prescriptions Filled: 4   The following medications were delivered to the patient:  · PERCOCET 5-325  · MORPHINE SUL ER 15MG  · SENNA PLUS 8.6-50MG  · TIZANIDINE 4MG  Total # of Interventions Completed: 0  Time Spent (min): 45    Additional Documentation:

## 2020-09-03 NOTE — PLAN OF CARE
Problem: Pain:  Goal: Pain level will decrease  Description: Pain level will decrease  9/3/2020 0947 by Sindy Chou RN  Outcome: Ongoing  Note: Pain level assessed and rated on a 0-10 scale  Assess characteristics of pain  PRN pain medication given per pt request  Non-pharmacological interventions implemented  Report ineffective pain management to physician  Update pt and family of any changes  Pt instructed to call out with new onset of pain  Continue to monitor       Problem: Falls - Risk of:  Goal: Will remain free from falls  Description: Will remain free from falls  9/3/2020 0947 by Sindy Chou RN  Outcome: Ongoing  Note: Room free of clutter  Hourly rounding   Non-skid socks worn  Side rails up x2  Bed low and locked  Call light in reach  Instructed to call out before getting out of bed  Anticipatory needs met  Bed alarm on  Falling star at the door and on wristband

## 2022-02-28 ENCOUNTER — OFFICE VISIT (OUTPATIENT)
Dept: ORTHOPEDIC SURGERY | Age: 37
End: 2022-02-28
Payer: COMMERCIAL

## 2022-02-28 VITALS — BODY MASS INDEX: 24.16 KG/M2 | HEIGHT: 65 IN | WEIGHT: 145 LBS

## 2022-02-28 DIAGNOSIS — M79.642 LEFT HAND PAIN: Primary | ICD-10-CM

## 2022-02-28 DIAGNOSIS — S54.8X2A: ICD-10-CM

## 2022-02-28 DIAGNOSIS — G56.32 LEFT RADIAL NERVE PALSY: Primary | ICD-10-CM

## 2022-02-28 PROCEDURE — 99203 OFFICE O/P NEW LOW 30 MIN: CPT | Performed by: FAMILY MEDICINE

## 2022-02-28 PROCEDURE — G8484 FLU IMMUNIZE NO ADMIN: HCPCS | Performed by: FAMILY MEDICINE

## 2022-02-28 PROCEDURE — 4004F PT TOBACCO SCREEN RCVD TLK: CPT | Performed by: FAMILY MEDICINE

## 2022-02-28 PROCEDURE — G8420 CALC BMI NORM PARAMETERS: HCPCS | Performed by: FAMILY MEDICINE

## 2022-02-28 PROCEDURE — G8427 DOCREV CUR MEDS BY ELIG CLIN: HCPCS | Performed by: FAMILY MEDICINE

## 2022-02-28 RX ORDER — METHYLPREDNISOLONE 4 MG/1
TABLET ORAL
Qty: 1 KIT | Refills: 0 | Status: SHIPPED | OUTPATIENT
Start: 2022-02-28 | End: 2022-03-01 | Stop reason: SDUPTHER

## 2022-02-28 NOTE — PROGRESS NOTES
Sports Medicine Consultation    CHIEF COMPLAINT:  Hand Pain (Left. 2m. no trauma. hand goes limp when lifting some heavy objects. had EMG at [facility] dx with radial nerve palsy)      HPI:  The patient is a 29 y.o. male who is being seen for  new patient being seen for regarding new problem of  Left wrist pain/weakness. The patient is a right hand dominant male who has had left hand/wrist pain for 2 months. As far as trauma to the hand the patient indicates slept on it wrong. The following medications/interventions have been tried: brace without benefit. he has a past medical history of Anxiety, Arthritis, rheumatoid ([address] Heartburn, and Iritis. he has a past surgical history that includes Tonsillectomy; Cardiac valuve replacement; back surgery (Bilateral, 09/02/2020); and Lumbar spine surgery (Bilateral, 9/2/2020). family history is not on file.     Social History     Socioeconomic History    Marital status: Single     Spouse name: Not on file    Number of children: Not on file    Years of education: Not on file    Highest education level: Not on file   Occupational History    Not on file   Tobacco Use    Smoking status: Current Every Day Smoker     Packs/day: 1.50     Years: 20.00     Pack years: 30.00     Types: Cigarettes     Start date: 2002    Smokeless tobacco: Never Used   Vaping Use    Vaping Use: Never used   Substance and Sexual Activity    Alcohol use: Yes     Comment: 3-4 shots and 5-6 beers per night    Drug use: Yes     Types: Marijuana Gelene Chasten)    Sexual activity: Not on file   Other Topics Concern    Not on file   Social History Narrative    Not on file     Social Determinants of Health     Financial Resource Strain:     Difficulty of Paying Living Expenses: Not on file   Food Insecurity:     Worried About Running Out of Food in the Last Year: Not on file    Zulma of Food in the Last Year: Not on file   Transportation Needs:     Lack of Transportation (Medical): Not on file    Lack of Transportation (Non-Medical): Not on file   Physical Activity:     Days of Exercise per Week: Not on file    Minutes of Exercise per Session: Not on file   Stress:     Feeling of Stress : Not on file   Social Connections:     Frequency of Communication with Friends and Family: Not on file    Frequency of Social Gatherings with Friends and Family: Not on file    Attends Rastafari Services: Not on file    Active Member of [address] [place] [address] [employer] or Organizations: Not on file    Attends Club or Organization Meetings: Not on file    Marital Status: Not on file   Intimate Partner Violence:     Fear of Current or Ex-Partner: Not on file    Emotionally Abused: Not on file    Physically Abused: Not on file    Sexually Abused: Not on file   Housing Stability:     Unable to Pay for Housing in the Last Year: Not on file    Number of [place] in the Last Year: Not on file    Unstable Housing in the Last Year: Not on file       Current Outpatient Medications   Medication Sig Dispense Refill    methotrexate (RHEUMATREX) 2.5 MG chemo tablet Take by mouth once a week      methylPREDNISolone (MEDROL DOSEPACK) 4 MG tablet Take by mouth. 1 kit 0    sennosides-docusate sodium (SENOKOT-S) 8.6-50 MG tablet Take 1 tablet by mouth 2 times daily (Patient not taking: Reported on 2/28/2022) 60 tablet 0    tiZANidine (ZANAFLEX) 4 MG tablet Take 1 tablet by mouth every 8 hours as needed (spasm) (Patient not taking: Reported on 2/28/2022) 50 tablet 0    OMEPRAZOLE PO Take by mouth as needed        No current facility-administered medications for this visit. Allergies:  heis allergic to penicillins. ROS:  CV:  Denies chest pain; palpitations; shortness of breath; swelling of feet, ankles; and loss of consciousness. CON: Denies fever and dizziness. ENT:  Denies hearing loss / ringing, ear infections hoarseness, and swallowing problems.   RESP:  Denies chronic cough, spitting up blood, and asthma/wheezing. GI: Denies abdominal pain, change in bowel habits, nausea or vomiting, and blood in stools. :  Denies frequent urination, burning or painful urination, blood in the urine, and bladder incontinence. NEURO:  Denies headache, memory loss, sleep disturbance, and tremor or movement disorder. PHYSICAL EXAM:    SKIN:  Intact without rashes, lesions or ulcerations. No obvious deformity or swelling. EYES:  Extraocular muscles intact. MOUTH: Oral mucosa moist.  No perioral lesions. PULM:  Respirations unlabored and regular. VASC:  Capillary refill less than 2 seconds. Hand/Wrist Exam  ROM:  Full flexor and extensor tendon function abnormal patient is unable to abduct or extend his thumb with true weakness  Finger, hand, and wrist range of motion are WNL  Inspection-Deformity: yes significant atrophy of the forearm with tenderness palpation at the lateral epicondyle  Palpation-Tenderness: More lateral tenderness palpation than anything  There is is thenar and is not interosseous atrophy. Strength- Reduced resulting in Significant  weakness resisted supination weakness pain with resisted middle finger extension testing  NEURO: Ulnar, and median nerves are intact to motor and sensory testing. Two point discrimination is less than six mm. There is decreased sensation to light touch and pinprick in the radial nerve but not the median and ulnar nerve distribution. CTS: Tinel's test at the wrist is negative. Flexion compression test negative  Phalen's test is negative. Finkelstein's test is positive. Elbow:  Range of motion and strength about the elbow is intact. Tinel's test is negative at the elbow. Cerv:  Full pain free range of motion with a negative Spurling's test.    RADIOLOGY: No results found.   3-View radiographs left hand failed to elicit any significant osseous abnormalities no obvious fractures or dislocations noted on plain film radiograph of the left hand  IMPRESSION:     1. Left radial nerve palsy    2. Posterior interosseous nerve injury, left, initial encounter        PLAN:   We discussed some of the etiologies and natural histories of     ICD-10-CM    1. Left radial nerve palsy  G56.32 Amb External Referral To Physical Therapy   2. Posterior interosseous nerve injury, left, initial encounter  D64.3F3U      We discussed the various treatment alternatives including anti-inflammatory medications, physical therapy, injections, further imaging studies and as a last resort surgery. At this point patient appears to have more of a PIN nerve palsy of that radial nerve. I am quite concerned as this is 2 months out and treatment has been suboptimal to this point I would like to treat him with a thumb spica splint and occupational or physical therapy based on his location. At the same time we will provide him a Medrol Dosepak to try and decrease some inflammation despite being somewhat chronic in nature. Will reevaluate in 6 weeks or I have asked for the EMG from the outside source for review. I will also make sure I discussed with surgical partner Dr. Marielena Guillen our upper extremity specialist due to the rarity of this condition    No follow-ups on file. Please be aware portions of this note were completed using voice recognition software and unforeseen errors may have occurred    Electronically signed by Rochelle Faria DO, FAOASM  on 2/28/22 at 4:49 PM EST      No orders of the defined types were placed in this encounter.

## 2022-03-01 RX ORDER — METHYLPREDNISOLONE 4 MG/1
TABLET ORAL
Qty: 1 KIT | Refills: 0 | Status: CANCELLED | OUTPATIENT
Start: 2022-03-01 | End: 2022-03-07

## 2022-03-01 RX ORDER — METHYLPREDNISOLONE 4 MG/1
TABLET ORAL
Qty: 1 KIT | Refills: 0 | Status: SHIPPED | OUTPATIENT
Start: 2022-03-01 | End: 2022-03-07

## 2022-06-23 ENCOUNTER — OFFICE VISIT (OUTPATIENT)
Dept: ORTHOPEDIC SURGERY | Age: 37
End: 2022-06-23
Payer: COMMERCIAL

## 2022-06-23 DIAGNOSIS — S54.8X2A: ICD-10-CM

## 2022-06-23 DIAGNOSIS — G56.32 LEFT RADIAL NERVE PALSY: Primary | ICD-10-CM

## 2022-06-23 PROCEDURE — G8427 DOCREV CUR MEDS BY ELIG CLIN: HCPCS | Performed by: FAMILY MEDICINE

## 2022-06-23 PROCEDURE — G8420 CALC BMI NORM PARAMETERS: HCPCS | Performed by: FAMILY MEDICINE

## 2022-06-23 PROCEDURE — 4004F PT TOBACCO SCREEN RCVD TLK: CPT | Performed by: FAMILY MEDICINE

## 2022-06-23 PROCEDURE — 99214 OFFICE O/P EST MOD 30 MIN: CPT | Performed by: FAMILY MEDICINE

## 2022-06-23 NOTE — PROGRESS NOTES
Sports Medicine Consultation    CHIEF COMPLAINT:  Hand Pain (Left. follow up. not better but no worse. has completed OT)      HPI:  The patient is a 40 y.o. male who is being seen for   established patient being seen for regarding follow up of a pre-existing problem of  Left hand weakness. The patient is a right hand dominant male who has had left hand/wrist pain for 6 months. As far as trauma to the hand the patient indicates no new trauma. The following medications/interventions have been tried: brace and ot without benefit. he has a past medical history of Anxiety, Arthritis, rheumatoid ([address] Heartburn, and Iritis. he has a past surgical history that includes Tonsillectomy; Cardiac valuve replacement; back surgery (Bilateral, 09/02/2020); and Lumbar spine surgery (Bilateral, 9/2/2020). family history is not on file. Social History     Socioeconomic History    Marital status: Single     Spouse name: Not on file    Number of children: Not on file    Years of education: Not on file    Highest education level: Not on file   Occupational History    Not on file   Tobacco Use    Smoking status: Current Every Day Smoker     Packs/day: 1.50     Years: 20.00     Pack years: 30.00     Types: Cigarettes     Start date: 2002    Smokeless tobacco: Never Used   Vaping Use    Vaping Use: Never used   Substance and Sexual Activity    Alcohol use: Yes     Comment: 3-4 shots and 5-6 beers per night    Drug use: Yes     Types: Marijuana Curlie Opal)    Sexual activity: Not on file   Other Topics Concern    Not on file   Social History Narrative    Not on file     Social Determinants of Health     Financial Resource Strain:     Difficulty of Paying Living Expenses: Not on file   Food Insecurity:     Worried About Running Out of Food in the Last Year: Not on file    [place]ulma of Food in the Last Year: Not on file   Transportation Needs:     Lack of Transportation (Medical):  Not on file    Lack of Transportation (Non-Medical): Not on file   Physical Activity:     Days of Exercise per Week: Not on file    Minutes of Exercise per Session: Not on file   Stress:     Feeling of Stress : Not on file   Social Connections:     Frequency of Communication with Friends and Family: Not on file    Frequency of Social Gatherings with Friends and Family: Not on file    Attends Rastafarian Services: Not on file    Active Member of [address] [place] [address] or Organizations: Not on file    Attends Club or Organization Meetings: Not on file    Marital Status: Not on file   Intimate Partner Violence:     Fear of Current or Ex-Partner: Not on file    Emotionally Abused: Not on file    Physically Abused: Not on file    Sexually Abused: Not on file   Housing Stability:     Unable to Pay for Housing in the Last Year: Not on file    Number of [place] in the Last Year: Not on file    Unstable Housing in the Last Year: Not on file       Current Outpatient Medications   Medication Sig Dispense Refill    methotrexate (RHEUMATREX) 2.5 MG chemo tablet Take by mouth once a week       No current facility-administered medications for this visit. Allergies:  heis allergic to penicillins. ROS:  CV:  Denies chest pain; palpitations; shortness of breath; swelling of feet, ankles; and loss of consciousness. CON: Denies fever and dizziness. ENT:  Denies hearing loss / ringing, ear infections hoarseness, and swallowing problems. RESP:  Denies chronic cough, spitting up blood, and asthma/wheezing. GI: Denies abdominal pain, change in bowel habits, nausea or vomiting, and blood in stools. :  Denies frequent urination, burning or painful urination, blood in the urine, and bladder incontinence. NEURO:  Denies headache, memory loss, sleep disturbance, and tremor or movement disorder. PHYSICAL EXAM:    SKIN:  Intact without rashes, lesions or ulcerations. No obvious deformity or swelling.   EYES:  Extraocular muscles intact. MOUTH: Oral mucosa moist.  No perioral lesions. PULM:  Respirations unlabored and regular. VASC:  Capillary refill less than 2 seconds. Hand/Wrist Exam  ROM:  Full flexor and extensor tendon function abnormal patient is unable to abduct or extend his thumb with true weakness, he has created coping mechanisms for pincer   Finger, hand, and wrist range of motion are WNL  Inspection-Deformity: yes significant atrophy of the forearm with tenderness palpation at the lateral epicondyle  Palpation-Tenderness: More lateral tenderness palpation than anything  There is thenar/hypothenar and is interosseous atrophy. Strength- Reduced resulting in Significant  weakness resisted supination weakness pain with resisted middle finger extension testing  NEURO: Ulnar, and median nerves are intact to motor and sensory testing. Two point discrimination is less than six mm. There is decreased sensation to light touch and pinprick in the radial nerve but not the median and ulnar nerve distribution. CTS: Tinel's test at the wrist is negative. Flexion compression test negative  Phalen's test is negative. Finkelstein's test is positive.     Elbow:  Range of motion and strength about the elbow is intact. Tinel's test is negative at the elbow.     Cerv:  Full pain free range of motion with a negative Spurling's test.    RADIOLOGY: No results found. IMPRESSION:     1. Left radial nerve palsy    2. Posterior interosseous nerve injury, left, initial encounter        PLAN:   We discussed some of the etiologies and natural histories of     ICD-10-CM    1. Left radial nerve palsy  G56.32 External Referral To Orthopedic Surgery   2.  Posterior interosseous nerve injury, left, initial encounter  I91.8L7S External Referral To Orthopedic Surgery     We discussed the various treatment alternatives including anti-inflammatory medications, physical therapy, injections, further imaging studies and as a last resort surgery. At this point I had an extremely long conversation with the patient encompassing 45minutes regarding diagnosis and treatment plan we spent a significant amount of time on his alcoholism and substance dependence issues is he was asking for benzodiazepines we went through multiple different treatment options including quitting alcohol cold turkey, inpatient rehabilitation, clinical psychology along with psychiatry patient is extremely disinterested in nearly all of those options as he feels he has tried them all in the past without success with his biggest concern being dying of a heart attack from a panic attack while drinking. We offered him help from our retained team along with making sure he had access to a crisis center call line for which she was disinterested in both of these as well in regards to his hand though he did do physical therapy occupational therapy he has not made significant gains he is only made marginal gains I do think that orthopedic surgery is appropriate we will refer him to a proper hand surgeon and he will follow-up with me otherwise as needed. He voiced understanding agreement satisfaction with this plan    No follow-ups on file. Please be aware portions of this note were completed using voice recognition software and unforeseen errors may have occurred    Electronically signed by Kia Ackerman DO, FAOASM  on 6/23/22 at 3:45 PM EDT      No orders of the defined types were placed in this encounter.

## 2023-06-06 PROBLEM — S82.831A CLOSED FRACTURE OF RIGHT DISTAL FIBULA: Status: ACTIVE | Noted: 2020-12-21

## 2023-06-06 PROBLEM — H04.123 DRY EYES: Status: ACTIVE | Noted: 2019-01-23

## 2024-05-22 ENCOUNTER — HOSPITAL ENCOUNTER (OUTPATIENT)
Age: 39
Setting detail: SPECIMEN
Discharge: HOME | End: 2024-05-22

## 2024-05-22 LAB
ALBUMIN SERPL-MCNC: 5.1 G/DL (ref 3.5–5.2)
ALBUMIN/GLOB SERPL: 2 {RATIO} (ref 1–2.5)
ALP SERPL-CCNC: 103 U/L (ref 40–129)
ALT SERPL-CCNC: 25 U/L (ref 10–50)
ANION GAP SERPL CALCULATED.3IONS-SCNC: 25 MMOL/L (ref 9–16)
AST SERPL-CCNC: 38 U/L (ref 10–50)
BASOPHILS # BLD: <0.03 K/UL (ref 0–0.2)
BASOPHILS NFR BLD: 0 % (ref 0–2)
BILIRUB SERPL-MCNC: 0.7 MG/DL (ref 0–1.2)
BUN SERPL-MCNC: 26 MG/DL (ref 6–20)
CALCIUM SERPL-MCNC: 9.9 MG/DL (ref 8.6–10.4)
CHLORIDE SERPL-SCNC: 79 MMOL/L (ref 98–107)
CO2 SERPL-SCNC: 27 MMOL/L (ref 20–31)
CREAT SERPL-MCNC: 5.8 MG/DL (ref 0.7–1.2)
EOSINOPHIL # BLD: <0.03 K/UL (ref 0–0.44)
EOSINOPHILS RELATIVE PERCENT: 0 % (ref 1–4)
ERYTHROCYTE [DISTWIDTH] IN BLOOD BY AUTOMATED COUNT: 14 % (ref 11.8–14.4)
GFR, ESTIMATED: 12 ML/MIN/1.73M2
GLUCOSE SERPL-MCNC: 113 MG/DL (ref 74–99)
HCT VFR BLD AUTO: 47.5 % (ref 40.7–50.3)
HGB BLD-MCNC: 17.1 G/DL (ref 13–17)
IMM GRANULOCYTES # BLD AUTO: 0.06 K/UL (ref 0–0.3)
IMM GRANULOCYTES NFR BLD: 1 %
LYMPHOCYTES NFR BLD: 0.76 K/UL (ref 1.1–3.7)
LYMPHOCYTES RELATIVE PERCENT: 8 % (ref 24–43)
MCH RBC QN AUTO: 33.6 PG (ref 25.2–33.5)
MCHC RBC AUTO-ENTMCNC: 36 G/DL (ref 28.4–34.8)
MCV RBC AUTO: 93.3 FL (ref 82.6–102.9)
MONOCYTES NFR BLD: 1.26 K/UL (ref 0.1–1.2)
MONOCYTES NFR BLD: 13 % (ref 3–12)
NEUTROPHILS NFR BLD: 78 % (ref 36–65)
NEUTS SEG NFR BLD: 7.96 K/UL (ref 1.5–8.1)
NRBC BLD-RTO: 0 PER 100 WBC
PLATELET # BLD AUTO: 213 K/UL (ref 138–453)
PMV BLD AUTO: 10.3 FL (ref 8.1–13.5)
POTASSIUM SERPL-SCNC: 3.9 MMOL/L (ref 3.7–5.3)
PROT SERPL-MCNC: 8.5 G/DL (ref 6.6–8.7)
RBC # BLD AUTO: 5.09 M/UL (ref 4.21–5.77)
SODIUM SERPL-SCNC: 131 MMOL/L (ref 136–145)
WBC OTHER # BLD: 10.1 K/UL (ref 3.5–11.3)

## 2024-05-23 ENCOUNTER — APPOINTMENT (OUTPATIENT)
Dept: GENERAL RADIOLOGY | Age: 39
DRG: 683 | End: 2024-05-23
Payer: COMMERCIAL

## 2024-05-23 ENCOUNTER — HOSPITAL ENCOUNTER (INPATIENT)
Age: 39
LOS: 1 days | Discharge: LEFT BEFORE DISCHARGE | DRG: 683 | End: 2024-05-24
Attending: EMERGENCY MEDICINE | Admitting: INTERNAL MEDICINE
Payer: COMMERCIAL

## 2024-05-23 DIAGNOSIS — E86.0 DEHYDRATION: ICD-10-CM

## 2024-05-23 DIAGNOSIS — N17.9 ACUTE RENAL FAILURE, UNSPECIFIED ACUTE RENAL FAILURE TYPE (HCC): Primary | ICD-10-CM

## 2024-05-23 PROBLEM — Z72.0 TOBACCO ABUSE: Status: ACTIVE | Noted: 2024-05-23

## 2024-05-23 PROBLEM — F10.10 ALCOHOL ABUSE: Status: ACTIVE | Noted: 2024-05-23

## 2024-05-23 PROBLEM — F41.9 ANXIETY: Status: ACTIVE | Noted: 2024-05-23

## 2024-05-23 LAB
ALBUMIN SERPL-MCNC: 4.2 G/DL (ref 3.5–5.2)
ALBUMIN SERPL-MCNC: NORMAL G/DL (ref 3.5–5.2)
ALP SERPL-CCNC: 85 U/L (ref 40–129)
ALP SERPL-CCNC: NORMAL U/L (ref 40–129)
ALT SERPL-CCNC: 21 U/L (ref 5–41)
ALT SERPL-CCNC: NORMAL U/L (ref 5–41)
AMPHET UR QL SCN: NEGATIVE
AMYLASE SERPL-CCNC: 112 U/L (ref 28–100)
ANION GAP SERPL CALCULATED.3IONS-SCNC: 15 MMOL/L (ref 9–17)
ANION GAP SERPL CALCULATED.3IONS-SCNC: 16 MMOL/L (ref 9–17)
AST SERPL-CCNC: 27 U/L
AST SERPL-CCNC: NORMAL U/L
BACTERIA URNS QL MICRO: ABNORMAL
BARBITURATES UR QL SCN: NEGATIVE
BASOPHILS # BLD: <0.03 K/UL (ref 0–0.2)
BASOPHILS NFR BLD: 0 % (ref 0–2)
BENZODIAZ UR QL: NEGATIVE
BILIRUB DIRECT SERPL-MCNC: 0.2 MG/DL
BILIRUB DIRECT SERPL-MCNC: NORMAL MG/DL
BILIRUB INDIRECT SERPL-MCNC: 0.8 MG/DL (ref 0–1)
BILIRUB SERPL-MCNC: 1 MG/DL (ref 0.3–1.2)
BILIRUB SERPL-MCNC: NORMAL MG/DL (ref 0.3–1.2)
BILIRUB UR QL STRIP: NEGATIVE
BUN SERPL-MCNC: 31 MG/DL (ref 6–20)
BUN SERPL-MCNC: 33 MG/DL (ref 6–20)
BUN/CREAT SERPL: 7 (ref 9–20)
BUN/CREAT SERPL: 7 (ref 9–20)
CALCIUM SERPL-MCNC: 8.7 MG/DL (ref 8.6–10.4)
CALCIUM SERPL-MCNC: 9.1 MG/DL (ref 8.6–10.4)
CANNABINOIDS UR QL SCN: POSITIVE
CASTS #/AREA URNS LPF: ABNORMAL /LPF
CHLORIDE SERPL-SCNC: 88 MMOL/L (ref 98–107)
CHLORIDE SERPL-SCNC: 88 MMOL/L (ref 98–107)
CK SERPL-CCNC: 224 U/L (ref 39–308)
CLARITY UR: CLEAR
CO2 SERPL-SCNC: 25 MMOL/L (ref 20–31)
CO2 SERPL-SCNC: 25 MMOL/L (ref 20–31)
COCAINE UR QL SCN: NEGATIVE
COLOR UR: YELLOW
CREAT SERPL-MCNC: 4.5 MG/DL (ref 0.7–1.2)
CREAT SERPL-MCNC: 4.9 MG/DL (ref 0.7–1.2)
CREAT UR-MCNC: 110 MG/DL (ref 39–259)
CRYSTALS URNS MICRO: ABNORMAL /HPF
EKG ATRIAL RATE: 104 BPM
EKG P AXIS: 86 DEGREES
EKG P-R INTERVAL: 122 MS
EKG Q-T INTERVAL: 326 MS
EKG QRS DURATION: 90 MS
EKG QTC CALCULATION (BAZETT): 428 MS
EKG R AXIS: 52 DEGREES
EKG T AXIS: 82 DEGREES
EKG VENTRICULAR RATE: 104 BPM
EOSINOPHIL # BLD: 0.03 K/UL (ref 0–0.44)
EOSINOPHILS RELATIVE PERCENT: 0 % (ref 1–4)
EPI CELLS #/AREA URNS HPF: ABNORMAL /HPF (ref 0–5)
ERYTHROCYTE [DISTWIDTH] IN BLOOD BY AUTOMATED COUNT: 14 % (ref 11.8–14.4)
FENTANYL UR QL: NEGATIVE
FLUAV AG SPEC QL: NEGATIVE
FLUBV AG SPEC QL: NEGATIVE
GFR, ESTIMATED: 15 ML/MIN/1.73M2
GFR, ESTIMATED: 16 ML/MIN/1.73M2
GLUCOSE SERPL-MCNC: 101 MG/DL (ref 70–99)
GLUCOSE SERPL-MCNC: 101 MG/DL (ref 70–99)
GLUCOSE UR STRIP-MCNC: ABNORMAL MG/DL
HCT VFR BLD AUTO: 43.4 % (ref 40.7–50.3)
HGB BLD-MCNC: 15.5 G/DL (ref 13–17)
HGB UR QL STRIP.AUTO: ABNORMAL
IMM GRANULOCYTES # BLD AUTO: 0.04 K/UL (ref 0–0.3)
IMM GRANULOCYTES NFR BLD: 0 %
KETONES UR STRIP-MCNC: NEGATIVE MG/DL
LEUKOCYTE ESTERASE UR QL STRIP: NEGATIVE
LIPASE SERPL-CCNC: 45 U/L (ref 13–60)
LYMPHOCYTES NFR BLD: 1.03 K/UL (ref 1.1–3.7)
LYMPHOCYTES RELATIVE PERCENT: 11 % (ref 24–43)
MAGNESIUM SERPL-MCNC: 2.4 MG/DL (ref 1.6–2.6)
MCH RBC QN AUTO: 33.3 PG (ref 25.2–33.5)
MCHC RBC AUTO-ENTMCNC: 35.7 G/DL (ref 28.4–34.8)
MCV RBC AUTO: 93.1 FL (ref 82.6–102.9)
METHADONE UR QL: NEGATIVE
MONOCYTES NFR BLD: 1.3 K/UL (ref 0.1–1.2)
MONOCYTES NFR BLD: 14 % (ref 3–12)
MYOGLOBIN SERPL-MCNC: 299 NG/ML (ref 28–72)
NEUTROPHILS NFR BLD: 75 % (ref 36–65)
NEUTS SEG NFR BLD: 7 K/UL (ref 1.5–8.1)
NITRITE UR QL STRIP: NEGATIVE
NRBC BLD-RTO: 0 PER 100 WBC
OPIATES UR QL SCN: NEGATIVE
OSMOLALITY UR: 232 MOSM/KG (ref 80–1300)
OXYCODONE UR QL SCN: NEGATIVE
PCP UR QL SCN: NEGATIVE
PH UR STRIP: 6 [PH] (ref 5–8)
PLATELET # BLD AUTO: 176 K/UL (ref 138–453)
PMV BLD AUTO: 10.5 FL (ref 8.1–13.5)
POTASSIUM SERPL-SCNC: 3.6 MMOL/L (ref 3.7–5.3)
POTASSIUM SERPL-SCNC: 3.7 MMOL/L (ref 3.7–5.3)
PROT SERPL-MCNC: 6.8 G/DL (ref 6.4–8.3)
PROT SERPL-MCNC: NORMAL G/DL (ref 6.4–8.3)
PROT UR STRIP-MCNC: ABNORMAL MG/DL
RBC # BLD AUTO: 4.66 M/UL (ref 4.21–5.77)
RBC #/AREA URNS HPF: ABNORMAL /HPF (ref 0–2)
SODIUM SERPL-SCNC: 128 MMOL/L (ref 135–144)
SODIUM SERPL-SCNC: 129 MMOL/L (ref 135–144)
SODIUM UR-SCNC: 33 MMOL/L
SP GR UR STRIP: 1.01 (ref 1–1.03)
TEST INFORMATION: ABNORMAL
TOTAL PROTEIN, URINE: 50 MG/DL
UROBILINOGEN UR STRIP-ACNC: NORMAL EU/DL (ref 0–1)
WBC #/AREA URNS HPF: ABNORMAL /HPF (ref 0–5)
WBC OTHER # BLD: 9.4 K/UL (ref 3.5–11.3)

## 2024-05-23 PROCEDURE — 82550 ASSAY OF CK (CPK): CPT

## 2024-05-23 PROCEDURE — 83735 ASSAY OF MAGNESIUM: CPT

## 2024-05-23 PROCEDURE — 82150 ASSAY OF AMYLASE: CPT

## 2024-05-23 PROCEDURE — 6360000002 HC RX W HCPCS: Performed by: NURSE PRACTITIONER

## 2024-05-23 PROCEDURE — 99223 1ST HOSP IP/OBS HIGH 75: CPT | Performed by: NURSE PRACTITIONER

## 2024-05-23 PROCEDURE — 99285 EMERGENCY DEPT VISIT HI MDM: CPT

## 2024-05-23 PROCEDURE — 80048 BASIC METABOLIC PNL TOTAL CA: CPT

## 2024-05-23 PROCEDURE — 84300 ASSAY OF URINE SODIUM: CPT

## 2024-05-23 PROCEDURE — 82570 ASSAY OF URINE CREATININE: CPT

## 2024-05-23 PROCEDURE — 93010 ELECTROCARDIOGRAM REPORT: CPT | Performed by: INTERNAL MEDICINE

## 2024-05-23 PROCEDURE — 84156 ASSAY OF PROTEIN URINE: CPT

## 2024-05-23 PROCEDURE — 2060000000 HC ICU INTERMEDIATE R&B

## 2024-05-23 PROCEDURE — 6370000000 HC RX 637 (ALT 250 FOR IP): Performed by: NURSE PRACTITIONER

## 2024-05-23 PROCEDURE — 83935 ASSAY OF URINE OSMOLALITY: CPT

## 2024-05-23 PROCEDURE — 83874 ASSAY OF MYOGLOBIN: CPT

## 2024-05-23 PROCEDURE — 83690 ASSAY OF LIPASE: CPT

## 2024-05-23 PROCEDURE — 87804 INFLUENZA ASSAY W/OPTIC: CPT

## 2024-05-23 PROCEDURE — 80307 DRUG TEST PRSMV CHEM ANLYZR: CPT

## 2024-05-23 PROCEDURE — 93005 ELECTROCARDIOGRAM TRACING: CPT | Performed by: NURSE PRACTITIONER

## 2024-05-23 PROCEDURE — 85025 COMPLETE CBC W/AUTO DIFF WBC: CPT

## 2024-05-23 PROCEDURE — 80076 HEPATIC FUNCTION PANEL: CPT

## 2024-05-23 PROCEDURE — 2580000003 HC RX 258: Performed by: NURSE PRACTITIONER

## 2024-05-23 PROCEDURE — 81001 URINALYSIS AUTO W/SCOPE: CPT

## 2024-05-23 PROCEDURE — 71045 X-RAY EXAM CHEST 1 VIEW: CPT

## 2024-05-23 RX ORDER — 0.9 % SODIUM CHLORIDE 0.9 %
1000 INTRAVENOUS SOLUTION INTRAVENOUS ONCE
Status: COMPLETED | OUTPATIENT
Start: 2024-05-23 | End: 2024-05-23

## 2024-05-23 RX ORDER — FOLIC ACID 1 MG/1
1 TABLET ORAL DAILY
Status: DISCONTINUED | OUTPATIENT
Start: 2024-05-23 | End: 2024-05-24 | Stop reason: HOSPADM

## 2024-05-23 RX ORDER — LORAZEPAM 1 MG/1
4 TABLET ORAL
Status: DISCONTINUED | OUTPATIENT
Start: 2024-05-23 | End: 2024-05-24 | Stop reason: HOSPADM

## 2024-05-23 RX ORDER — HEPARIN SODIUM 5000 [USP'U]/ML
5000 INJECTION, SOLUTION INTRAVENOUS; SUBCUTANEOUS EVERY 8 HOURS SCHEDULED
Status: DISCONTINUED | OUTPATIENT
Start: 2024-05-23 | End: 2024-05-24 | Stop reason: HOSPADM

## 2024-05-23 RX ORDER — SODIUM CHLORIDE 0.9 % (FLUSH) 0.9 %
5-40 SYRINGE (ML) INJECTION EVERY 12 HOURS SCHEDULED
Status: DISCONTINUED | OUTPATIENT
Start: 2024-05-23 | End: 2024-05-24 | Stop reason: HOSPADM

## 2024-05-23 RX ORDER — DIPHENHYDRAMINE HCL 25 MG
25 TABLET ORAL EVERY 6 HOURS PRN
COMMUNITY

## 2024-05-23 RX ORDER — LORAZEPAM 2 MG/ML
3 INJECTION INTRAMUSCULAR
Status: DISCONTINUED | OUTPATIENT
Start: 2024-05-23 | End: 2024-05-24 | Stop reason: HOSPADM

## 2024-05-23 RX ORDER — GAUZE BANDAGE 2" X 2"
100 BANDAGE TOPICAL DAILY
Status: DISCONTINUED | OUTPATIENT
Start: 2024-05-23 | End: 2024-05-24 | Stop reason: HOSPADM

## 2024-05-23 RX ORDER — LORAZEPAM 2 MG/ML
2 INJECTION INTRAMUSCULAR
Status: DISCONTINUED | OUTPATIENT
Start: 2024-05-23 | End: 2024-05-24 | Stop reason: HOSPADM

## 2024-05-23 RX ORDER — SODIUM CHLORIDE 0.9 % (FLUSH) 0.9 %
5-40 SYRINGE (ML) INJECTION PRN
Status: DISCONTINUED | OUTPATIENT
Start: 2024-05-23 | End: 2024-05-24 | Stop reason: HOSPADM

## 2024-05-23 RX ORDER — SODIUM CHLORIDE 9 MG/ML
INJECTION, SOLUTION INTRAVENOUS PRN
Status: DISCONTINUED | OUTPATIENT
Start: 2024-05-23 | End: 2024-05-24 | Stop reason: HOSPADM

## 2024-05-23 RX ORDER — ACETAMINOPHEN 650 MG/1
650 SUPPOSITORY RECTAL EVERY 6 HOURS PRN
Status: DISCONTINUED | OUTPATIENT
Start: 2024-05-23 | End: 2024-05-24 | Stop reason: HOSPADM

## 2024-05-23 RX ORDER — FOLIC ACID 1 MG/1
1 TABLET ORAL DAILY
COMMUNITY

## 2024-05-23 RX ORDER — PANTOPRAZOLE SODIUM 40 MG/1
40 TABLET, DELAYED RELEASE ORAL
Status: DISCONTINUED | OUTPATIENT
Start: 2024-05-24 | End: 2024-05-24 | Stop reason: HOSPADM

## 2024-05-23 RX ORDER — LORAZEPAM 1 MG/1
1 TABLET ORAL
Status: DISCONTINUED | OUTPATIENT
Start: 2024-05-23 | End: 2024-05-24 | Stop reason: HOSPADM

## 2024-05-23 RX ORDER — SODIUM CHLORIDE 9 MG/ML
INJECTION, SOLUTION INTRAVENOUS CONTINUOUS
Status: DISCONTINUED | OUTPATIENT
Start: 2024-05-23 | End: 2024-05-24 | Stop reason: HOSPADM

## 2024-05-23 RX ORDER — LORAZEPAM 2 MG/ML
1 INJECTION INTRAMUSCULAR
Status: DISCONTINUED | OUTPATIENT
Start: 2024-05-23 | End: 2024-05-24 | Stop reason: HOSPADM

## 2024-05-23 RX ORDER — OMEPRAZOLE 20 MG/1
20 CAPSULE, DELAYED RELEASE ORAL DAILY
COMMUNITY

## 2024-05-23 RX ORDER — LORAZEPAM 2 MG/ML
1 INJECTION INTRAMUSCULAR EVERY 4 HOURS PRN
Status: DISCONTINUED | OUTPATIENT
Start: 2024-05-23 | End: 2024-05-23

## 2024-05-23 RX ORDER — LORAZEPAM 1 MG/1
2 TABLET ORAL
Status: DISCONTINUED | OUTPATIENT
Start: 2024-05-23 | End: 2024-05-24 | Stop reason: HOSPADM

## 2024-05-23 RX ORDER — POLYETHYLENE GLYCOL 3350 17 G
2 POWDER IN PACKET (EA) ORAL ONCE
Status: COMPLETED | OUTPATIENT
Start: 2024-05-23 | End: 2024-05-23

## 2024-05-23 RX ORDER — LORAZEPAM 1 MG/1
3 TABLET ORAL
Status: DISCONTINUED | OUTPATIENT
Start: 2024-05-23 | End: 2024-05-24 | Stop reason: HOSPADM

## 2024-05-23 RX ORDER — ONDANSETRON 2 MG/ML
4 INJECTION INTRAMUSCULAR; INTRAVENOUS EVERY 6 HOURS PRN
Status: DISCONTINUED | OUTPATIENT
Start: 2024-05-23 | End: 2024-05-24 | Stop reason: HOSPADM

## 2024-05-23 RX ORDER — ACETAMINOPHEN 325 MG/1
650 TABLET ORAL EVERY 6 HOURS PRN
Status: DISCONTINUED | OUTPATIENT
Start: 2024-05-23 | End: 2024-05-24 | Stop reason: HOSPADM

## 2024-05-23 RX ORDER — LORAZEPAM 2 MG/ML
4 INJECTION INTRAMUSCULAR
Status: DISCONTINUED | OUTPATIENT
Start: 2024-05-23 | End: 2024-05-24 | Stop reason: HOSPADM

## 2024-05-23 RX ORDER — ONDANSETRON 4 MG/1
4 TABLET, ORALLY DISINTEGRATING ORAL EVERY 8 HOURS PRN
Status: DISCONTINUED | OUTPATIENT
Start: 2024-05-23 | End: 2024-05-24 | Stop reason: HOSPADM

## 2024-05-23 RX ADMIN — SODIUM CHLORIDE 1000 ML: 9 INJECTION, SOLUTION INTRAVENOUS at 13:59

## 2024-05-23 RX ADMIN — Medication 100 MG: at 21:10

## 2024-05-23 RX ADMIN — SODIUM CHLORIDE: 9 INJECTION, SOLUTION INTRAVENOUS at 18:23

## 2024-05-23 RX ADMIN — LORAZEPAM 2 MG: 2 INJECTION INTRAMUSCULAR; INTRAVENOUS at 23:00

## 2024-05-23 RX ADMIN — LORAZEPAM 2 MG: 2 INJECTION INTRAMUSCULAR; INTRAVENOUS at 21:32

## 2024-05-23 RX ADMIN — NICOTINE POLACRILEX 2 MG: 2 LOZENGE ORAL at 16:34

## 2024-05-23 RX ADMIN — FOLIC ACID 1 MG: 1 TABLET ORAL at 21:09

## 2024-05-23 RX ADMIN — HEPARIN SODIUM 5000 UNITS: 5000 INJECTION INTRAVENOUS; SUBCUTANEOUS at 21:30

## 2024-05-23 ASSESSMENT — ENCOUNTER SYMPTOMS
COUGH: 0
ABDOMINAL PAIN: 0
NAUSEA: 1
VOMITING: 1
DIARRHEA: 1
SHORTNESS OF BREATH: 0
BACK PAIN: 0

## 2024-05-23 ASSESSMENT — VISUAL ACUITY: OU: 1

## 2024-05-23 ASSESSMENT — PAIN - FUNCTIONAL ASSESSMENT: PAIN_FUNCTIONAL_ASSESSMENT: NONE - DENIES PAIN

## 2024-05-23 NOTE — H&P
[facility]  Office: [phone]  Fady Flores DO, Chris Medina DO, Nick Yarbrough DO, Gary Wei DO, Aileen Lloyd MD, Marie Astorga MD, Jacob Kelly MD, Zaira Estrella MD,  German Ramos MD, Vi Castillo MD, Sigifredo Schwartz MD,  Denice Streeter DO, Sebastián Suggs MD, Tye Boateng MD, Stan Flores DO, Nida Wilhelm MD,  Damir Herrera DO, Mallory Brasher MD, Rosana Escalante MD, Conchis Jiménez MD, Salazar Trinh MD,  Santos Rai MD, Jonna Delgado MD, Raymundo Ambrocio MD, Silver Fisher MD, Angel Dhaliwal MD, Michael Lewis MD, Camron Salas DO, Dillon Brizuela DO, Debbie Muller MD,  Delvin Leija MD, Shirley Waterhouse, CNP,  Kasie Early CNP, Maxwell Lugo, CNP,  Desiree Hannah, DNP, Stacie Moss, CNP, Rosangela Kennedy, CNP, Lauren Franco, CNP, Layla Fritz, CNP, Leandra Vega PALeonardC, Ivory Cool PA-C, Taina Cunningham, CNP, Winsome Herrera, CNP, Denys Pond, CNP, Twila Montgomery, CNP, Gretchen Rudolph, CNP, Maria De Jesus Sebastian, CNS, Michelle Thapa, CNP, Dulce Sky CNP, Tracy Schwab, CNP         [facility]   IN-PATIENT SERVICE   [facility]    HISTORY AND PHYSICAL EXAMINATION            Date:   5/23/2024  Patient name:  Abdifatah De Anda  Date of admission:  5/23/2024  1:13 PM  MRN:   [id number]  Account:  [id number]  YOB: 1985  PCP:    Damir Giles PA-C  Room:   [facility]-  Code Status:    Full Code    Chief Complaint:     Chief Complaint   Patient presents with    Abnormal Lab     Labs yesterday states in kidney failure       History Obtained From:     patient    History of Present Illness:     Abdifatah De Anda is a 39 y.o. male with a history of RA and hypertension who presents with abnormal outpatient labs and is admitted to the hospital for the management of CHRISTINA (acute kidney injury) (HCC).  Patient reports that he developed abdominal pain nausea vomiting and diarrhea about 5 days ago and subsequently had difficulty

## 2024-05-23 NOTE — PROGRESS NOTES
Pt admitted to room 1021 from ED. Admission documentation complete, vitals taken and telemetry placed. Pt oriented to room and unit routine, including hourly rounding. Call light in reach and safety maintained. Will continue to provide support and education.

## 2024-05-23 NOTE — PROGRESS NOTES
Pt signed paper to leave floor to go outside and smoke. Pt was educated on risks associated with leaving the floor unattended.

## 2024-05-23 NOTE — ED PROVIDER NOTES
EMERGENCY DEPARTMENT ENCOUNTER   ATTENDING ATTESTATION     Pt Name: Abdifatah De Anda  MRN: [id number]  Birthdate 1985  Date of evaluation: 5/23/24   Abdifatah De Anda is a 39 y.o. male with CC: Abnormal Lab (Labs yesterday states in kidney failure)    MDM:   I performed a substantive part of the MDM during the patient's E/M visit. I personally evaluated and examined the patient. I personally made or approved the documented management plan and acknowledge its risk of complications.    Independent Interpretation: My (EKG/X-Ray/US/CT) interpretation     Discussion: Management/test interpretation discussed with     39 male presents with complaints of abnormal laboratory studies.  The patient had outpatient labs performed yesterday that showed significantly elevated creatinine, patient's creatinine continues to be significantly elevated today, plan at this time is to discuss with the hospitalist for admission.           CRITICAL CARE:       EKG: All EKG's are interpreted by the Emergency Department Physician who either signs or Co-signs this chart in the absence of a cardiologist.      RADIOLOGY:All plain film, CT, MRI, and formal ultrasound images (except ED bedside ultrasound) are read by the radiologist, see reports below, unless otherwise noted in MDM or here.  XR CHEST PORTABLE   Final Result   Some scarring or infiltrate noted in the left lower lobe.  This could   represent pneumonia.           LABS: All lab results were reviewed by myself, and all abnormals are listed below.  Labs Reviewed   CBC WITH AUTO DIFFERENTIAL - Abnormal; Notable for the following components:       Result Value    MCHC 35.7 (*)     Neutrophils % 75 (*)     Lymphocytes % 11 (*)     Monocytes % 14 (*)     Eosinophils % 0 (*)     Lymphocytes Absolute 1.03 (*)     Monocytes Absolute 1.30 (*)     All other components within normal limits   MYOGLOBIN, BLOOD - Abnormal; Notable for the following components:    Myoglobin 299 (*)     All

## 2024-05-23 NOTE — PROGRESS NOTES
[x] Home medications reviewed and confirmed with pt  [x] Unable to validate med reconciliation      [x] There are one or more home medications that need clarification before Medication Reconciliation can be completed. The Med List Status has been marked as In Progress.     To assist with Home Medication Reconciliation the following actions have been taken:    [x] Pharmacy medication reconciliation service requested. (Note: This can be done by sending a Perfect Serve message to The Med Rec Pharmacist or by phoning [phone].)

## 2024-05-23 NOTE — ED PROVIDER NOTES
[facility] ED  eMERGENCY dEPARTMENT eNCOUnter      Pt Name: Abdifatah De Anda  MRN: [id number]  Birthdate 1985  Date of evaluation: 5/23/2024  Provider: JOAQUINA Ceja CNP    CHIEF COMPLAINT       Chief Complaint   Patient presents with    Abnormal Lab     Labs yesterday states in kidney failure         HISTORY OF PRESENT ILLNESS  (Location/Symptom, Timing/Onset, Context/Setting, Quality, Duration, Modifying Factors, Severity.)   Abdifatah De Anda is a 39 y.o. male who presents to the emergency department for evaluation of abnormal lab.  Patient developed abdominal pain, nausea, vomiting and diarrhea 5 days ago.  He states for the past week he had difficulty drinking or eating anything.  He also states he typically does drink liquor daily.  He initially went to urgent care and was given Zofran and sent home.  Patient states he went to work yesterday morning but did not feel good so he went back to urgent care and was given IV fluids and had outpatient labs.  Patient states he was notified today by urgent care that his creatinine was 5.8 and was told to go to the ER for evaluation.  Potassium yesterday 3.9.  Patient has no previous history of kidney disease.  Patient states he discharged to feel better last night and he has been able to keep down fluids today.  Denies chest pain abdominal pain fever chills upon arrival.  Patient states he has been urinating was been decreased.  His diarrhea has subsided.  He also has a history of rheumatoid arthritis and is supposed to take methotrexate which he has not taken the past month.  He denies recent increased use of NSAIDs.      Nursing Notes were reviewed.    ALLERGIES     Adalimumab, Penicillins, Penicillin g, and Zolpidem    CURRENT MEDICATIONS       Previous Medications    ASPIRIN-CAFFEINE (EUGENIO BACK & BODY PAIN EX ST) 500-32.5 MG TABS    Take by mouth daily    FOLIC ACID (FOLVITE) 1 MG TABLET    Take 1 tablet by mouth daily    GABAPENTIN

## 2024-05-23 NOTE — ED NOTES
101 (*)     BUN 33 (*)     Creatinine 4.9 (*)     Est, Glom Filt Rate 15 (*)     BUN/Creatinine Ratio 7 (*)     All other components within normal limits   MICROSCOPIC URINALYSIS - Abnormal; Notable for the following components:    Crystals, UA 0 TO 2 URIC ACID (*)     Bacteria, UA FEW (*)     All other components within normal limits   URINE DRUG SCREEN - Abnormal; Notable for the following components:    Cannabinoid Scrn, Ur POSITIVE (*)     All other components within normal limits     Critical values: yes     Abnormal Assessment Findings: Na 129, K 3.6, cre 4.9      Background  History:   Past Medical History:   Diagnosis Date    Anxiety     Arthritis, rheumatoid (HCC) 2014    Heartburn     Iritis        Medication Review:  [] Via patient  [] From medication list  [] Pharmacy Med Rec  [] Unable to assess based on patient condition  [] Rn not able to complete      Assessment    Vitals/MEWS: MEWS Score: 3  Level of Consciousness: Alert (0)   Vitals:    05/23/24 1410 05/23/24 1500 05/23/24 1515 05/23/24 1545   BP:  100/74 (!) 99/58    Pulse: 97 (!) 101 97 96   Resp: 21 21 25 24   Temp:       TempSrc:       SpO2: 91% 90% (!) 88% 91%   Weight:       Height:         FiO2 (%): room air    O2 Flow Rate: O2 Device: None (Room air)    Cardiac Rhythm:    Pain Assessment: 0 [x] Verbal [] Ramos López Scale  Pain Scale: Pain Assessment  Pain Assessment: None - Denies Pain  Last documented pain score (0-10 scale)    Last documented pain medication administered: none    Mental Status: oriented and alert  Orientation Level:    NIH Score:    C-SSRS: Risk of Suicide: No Risk  Bedside swallow:    [place] Coma Scale (GCS):    Active LDA's:   Peripheral IV 05/23/24 Distal;Left;Anterior Cephalic (Active)   Site Assessment Clean, dry & intact 05/23/24 1357   Line Status Brisk blood return 05/23/24 1357   Line Care Connections checked and tightened 05/23/24 1357   Phlebitis Assessment No symptoms 05/23/24 1357   Infiltration Assessment 0

## 2024-05-24 ENCOUNTER — APPOINTMENT (OUTPATIENT)
Dept: ULTRASOUND IMAGING | Age: 39
DRG: 683 | End: 2024-05-24
Payer: COMMERCIAL

## 2024-05-24 VITALS
RESPIRATION RATE: 20 BRPM | SYSTOLIC BLOOD PRESSURE: 103 MMHG | WEIGHT: 155 LBS | BODY MASS INDEX: 25.83 KG/M2 | TEMPERATURE: 98.2 F | DIASTOLIC BLOOD PRESSURE: 70 MMHG | HEIGHT: 65 IN | OXYGEN SATURATION: 95 % | HEART RATE: 111 BPM

## 2024-05-24 LAB
ALBUMIN SERPL-MCNC: 4.2 G/DL (ref 3.5–5.2)
ALP SERPL-CCNC: 90 U/L (ref 40–129)
ALT SERPL-CCNC: 23 U/L (ref 5–41)
ANION GAP SERPL CALCULATED.3IONS-SCNC: 12 MMOL/L (ref 9–17)
ANION GAP SERPL CALCULATED.3IONS-SCNC: 16 MMOL/L (ref 9–17)
AST SERPL-CCNC: 35 U/L
BASOPHILS # BLD: 0.06 K/UL (ref 0–0.2)
BASOPHILS NFR BLD: 1 % (ref 0–2)
BILIRUB SERPL-MCNC: 1.2 MG/DL (ref 0.3–1.2)
BUN SERPL-MCNC: 36 MG/DL (ref 6–20)
BUN SERPL-MCNC: 37 MG/DL (ref 6–20)
BUN/CREAT SERPL: 11 (ref 9–20)
BUN/CREAT SERPL: 13 (ref 9–20)
CALCIUM SERPL-MCNC: 9 MG/DL (ref 8.6–10.4)
CALCIUM SERPL-MCNC: 9.2 MG/DL (ref 8.6–10.4)
CAMPYLOBACTER DNA SPEC NAA+PROBE: NORMAL
CHLORIDE SERPL-SCNC: 87 MMOL/L (ref 98–107)
CHLORIDE SERPL-SCNC: 91 MMOL/L (ref 98–107)
CO2 SERPL-SCNC: 27 MMOL/L (ref 20–31)
CO2 SERPL-SCNC: 27 MMOL/L (ref 20–31)
CREAT SERPL-MCNC: 2.7 MG/DL (ref 0.7–1.2)
CREAT SERPL-MCNC: 3.3 MG/DL (ref 0.7–1.2)
DATE, STOOL #1: NORMAL
EOSINOPHIL # BLD: 0.07 K/UL (ref 0–0.44)
EOSINOPHILS RELATIVE PERCENT: 1 % (ref 1–4)
ERYTHROCYTE [DISTWIDTH] IN BLOOD BY AUTOMATED COUNT: 14 % (ref 11.8–14.4)
ETEC ELTA+ESTB GENES STL QL NAA+PROBE: NORMAL
GFR, ESTIMATED: 24 ML/MIN/1.73M2
GFR, ESTIMATED: 30 ML/MIN/1.73M2
GLUCOSE SERPL-MCNC: 102 MG/DL (ref 70–99)
GLUCOSE SERPL-MCNC: 111 MG/DL (ref 70–99)
HCT VFR BLD AUTO: 40.6 % (ref 40.7–50.3)
HEMOCCULT SP1 STL QL: NEGATIVE
HGB BLD-MCNC: 13.9 G/DL (ref 13–17)
IMM GRANULOCYTES # BLD AUTO: 0.02 K/UL (ref 0–0.3)
IMM GRANULOCYTES NFR BLD: 0 %
INR PPP: 0.9
LYMPHOCYTES NFR BLD: 1.4 K/UL (ref 1.1–3.7)
LYMPHOCYTES RELATIVE PERCENT: 22 % (ref 24–43)
MCH RBC QN AUTO: 33.3 PG (ref 25.2–33.5)
MCHC RBC AUTO-ENTMCNC: 34.2 G/DL (ref 28.4–34.8)
MCV RBC AUTO: 97.1 FL (ref 82.6–102.9)
MONOCYTES NFR BLD: 0.99 K/UL (ref 0.1–1.2)
MONOCYTES NFR BLD: 15 % (ref 3–12)
NEUTROPHILS NFR BLD: 61 % (ref 36–65)
NEUTS SEG NFR BLD: 3.98 K/UL (ref 1.5–8.1)
NRBC BLD-RTO: 0 PER 100 WBC
P SHIGELLOIDES DNA STL QL NAA+PROBE: NORMAL
PHOSPHATE SERPL-MCNC: 5 MG/DL (ref 2.5–4.5)
PLATELET # BLD AUTO: 161 K/UL (ref 138–453)
PMV BLD AUTO: 10.4 FL (ref 8.1–13.5)
POTASSIUM SERPL-SCNC: 3.8 MMOL/L (ref 3.7–5.3)
POTASSIUM SERPL-SCNC: 3.9 MMOL/L (ref 3.7–5.3)
PROT SERPL-MCNC: 6.9 G/DL (ref 6.4–8.3)
PROTHROMBIN TIME: 12.3 SEC (ref 11.5–14.2)
RBC # BLD AUTO: 4.18 M/UL (ref 4.21–5.77)
ROTAVIRUS ANTIGEN: NEGATIVE
SALMONELLA DNA SPEC QL NAA+PROBE: NORMAL
SHIGA TOXIN STX GENE SPEC NAA+PROBE: NORMAL
SHIGELLA DNA SPEC QL NAA+PROBE: NORMAL
SODIUM SERPL-SCNC: 130 MMOL/L (ref 135–144)
SODIUM SERPL-SCNC: 130 MMOL/L (ref 135–144)
SOURCE, 60200063: NORMAL
SPECIMEN DESCRIPTION: NORMAL
TIME, STOOL #1: 105
V CHOL+PARA RFBL+TRKH+TNAA STL QL NAA+PR: NORMAL
WBC OTHER # BLD: 6.5 K/UL (ref 3.5–11.3)
Y ENTERO RECN STL QL NAA+PROBE: NORMAL

## 2024-05-24 PROCEDURE — 80053 COMPREHEN METABOLIC PANEL: CPT

## 2024-05-24 PROCEDURE — 84100 ASSAY OF PHOSPHORUS: CPT

## 2024-05-24 PROCEDURE — 87425 ROTAVIRUS AG IA: CPT

## 2024-05-24 PROCEDURE — 94761 N-INVAS EAR/PLS OXIMETRY MLT: CPT

## 2024-05-24 PROCEDURE — 82270 OCCULT BLOOD FECES: CPT

## 2024-05-24 PROCEDURE — 87506 IADNA-DNA/RNA PROBE TQ 6-11: CPT

## 2024-05-24 PROCEDURE — 85025 COMPLETE CBC W/AUTO DIFF WBC: CPT

## 2024-05-24 PROCEDURE — 85610 PROTHROMBIN TIME: CPT

## 2024-05-24 PROCEDURE — 80048 BASIC METABOLIC PNL TOTAL CA: CPT

## 2024-05-24 PROCEDURE — 6360000002 HC RX W HCPCS: Performed by: NURSE PRACTITIONER

## 2024-05-24 PROCEDURE — 2580000003 HC RX 258: Performed by: NURSE PRACTITIONER

## 2024-05-24 PROCEDURE — 2500000003 HC RX 250 WO HCPCS: Performed by: NURSE PRACTITIONER

## 2024-05-24 PROCEDURE — 99238 HOSP IP/OBS DSCHRG MGMT 30/<: CPT | Performed by: NURSE PRACTITIONER

## 2024-05-24 PROCEDURE — 36415 COLL VENOUS BLD VENIPUNCTURE: CPT

## 2024-05-24 RX ORDER — HALOPERIDOL 5 MG/ML
5 INJECTION INTRAMUSCULAR ONCE
Status: COMPLETED | OUTPATIENT
Start: 2024-05-24 | End: 2024-05-24

## 2024-05-24 RX ORDER — SODIUM CHLORIDE 0.9 % (FLUSH) 0.9 %
5-40 SYRINGE (ML) INJECTION PRN
Status: DISCONTINUED | OUTPATIENT
Start: 2024-05-24 | End: 2024-05-24 | Stop reason: HOSPADM

## 2024-05-24 RX ORDER — SODIUM CHLORIDE 9 MG/ML
INJECTION, SOLUTION INTRAVENOUS PRN
Status: DISCONTINUED | OUTPATIENT
Start: 2024-05-24 | End: 2024-05-24 | Stop reason: SDUPTHER

## 2024-05-24 RX ORDER — SODIUM CHLORIDE 0.9 % (FLUSH) 0.9 %
5-40 SYRINGE (ML) INJECTION EVERY 12 HOURS SCHEDULED
Status: DISCONTINUED | OUTPATIENT
Start: 2024-05-24 | End: 2024-05-24 | Stop reason: HOSPADM

## 2024-05-24 RX ORDER — POLYETHYLENE GLYCOL 3350 17 G
2 POWDER IN PACKET (EA) ORAL
Status: DISCONTINUED | OUTPATIENT
Start: 2024-05-24 | End: 2024-05-24 | Stop reason: HOSPADM

## 2024-05-24 RX ADMIN — LORAZEPAM 4 MG: 2 INJECTION INTRAMUSCULAR; INTRAVENOUS at 01:22

## 2024-05-24 RX ADMIN — HALOPERIDOL LACTATE 5 MG: 5 INJECTION, SOLUTION INTRAMUSCULAR at 01:48

## 2024-05-24 RX ADMIN — LORAZEPAM 1 MG: 2 INJECTION INTRAMUSCULAR; INTRAVENOUS at 04:07

## 2024-05-24 RX ADMIN — LORAZEPAM 3 MG: 2 INJECTION INTRAMUSCULAR; INTRAVENOUS at 00:02

## 2024-05-24 RX ADMIN — DEXMEDETOMIDINE 0.2 MCG/KG/HR: 100 INJECTION, SOLUTION INTRAVENOUS at 03:33

## 2024-05-24 NOTE — PROGRESS NOTES
0430: Updated in-house NP Randee Pond of patients frequent requests of wanting to leave to \"handle things\" at home, including paying rent and car insurance. At this time precedex infusing and writer questioned why it had been started since patient was alert and oriented and could have left AMA prior to being transferred. She stated that she hadnt yet been in the patients chart or very familiar with his case and had only suggested to the on call NP to start the precedex gtt to help with the patients agitation/restlessness.  Randee suggested that writer reach out to the on call NP for further instructions on plan of care.      0500: Writer spoke with on call NP Maricruz Sanchez regarding patients request to leave AMA. Updated her that precedex has been shut off at 0440. Although he continues to be restlessness, he has remained alert and oriented since being transferred. She was told that he has been redirectable with sitter at the bedside. Maricruz was ok with writers decision to stop the precedex gtt and understood that patient may choose to leave AMA in a couple hours.

## 2024-05-24 NOTE — PROGRESS NOTES
Pt is currently in bed and expressing feelings of anxiety. On call NP notified about pts symptoms of detox. CIWA scale ordered. Multiple PRN doses of ativan have been given without success. Pt is growing more and more agitated. On call NP notified. One time dose of haldol ordered, and given. New orders in for pt to be transferred to ICU.    Standard safety measures in place. Call light within reach. Bed in locked and lowest position.Care ongoing.

## 2024-05-24 NOTE — PROGRESS NOTES
Pt refusing to stay in bed and is becoming more agitated. Stating he needs to go smoke. On call NP notified.

## 2024-05-24 NOTE — PROGRESS NOTES
Security called down to 1021 d/t pt turning off own bed alarm, leaving room, and wandering hopson after stating he \"did not want to lose his shit on anyone.\"

## 2024-05-24 NOTE — PROGRESS NOTES
Physician Progress Note      PATIENT:               MEI CHILEL  CSN #:                  [id number]  :                       1985  ADMIT DATE:       2024 1:13 PM  DISCH DATE:        2024 6:58 AM  RESPONDING  PROVIDER #:        ANKIT DOMO APRN - NP          QUERY TEXT:    Pt admitted with CHRISTINA.  Pt noted to have low serum sodium. If possible, please   document in the progress notes and discharge summary if you are evaluating and   / or treating any of the following:    The medical record reflects the following:  Risk Factors: Dehydration/CHRISTINA  Clinical Indicators: NA on admission of 131, then 129, 128, and 130, patient   noted to have CHRISTINA and dehydation  Treatment: IVF, labs    Thank you,  Dana POE RN  Options provided:  -- Hyponatremia  -- Pseudohyponatremia  -- Clinically insignificant low serum sodium  -- Other - I will add my own diagnosis  -- Disagree - Not applicable / Not valid  -- Disagree - Clinically unable to determine / Unknown  -- Refer to Clinical Documentation Reviewer    PROVIDER RESPONSE TEXT:    This patient has hyponatremia.    Query created by: Mely Kennedy on 2024 4:32 AM      Electronically signed by:  ANKIT Valle NP 2024 8:14 AM

## 2024-05-24 NOTE — DISCHARGE SUMMARY
taken any gabapentin for 3 to 4 months.  He denies any use of any other NSAIDs.  He reports he smokes marijuana but denies any other drug use.  Urine drug tox screen positive for cannabinoids otherwise negative     Rapid flu A/B negative, no leukocytosis     Repeat laboratory studies revealed NA of 129, potassium 3.6, BUN 33, creatinine 4.9, magnesium 2.4     He was given IV fluid bolus and Nephrology was consulted from the ED     Chest x-ray revealed some scarring or infiltrate noted to the left lower lobe, this could represent pneumonia.      Clinically does not appear to have pneumonia, believe chest x-ray results are likely more consistent with COPD     Patient reports that he is only attempted to stop drinking once after he got a back surgery however he was taking pain pills so he did not experience much symptoms related to withdrawal other than anxiety/agitation    Patient became increasingly more agitated overnight associated with alcohol withdrawal which was unresponsive to IV Ativan.  Ultimately was started on Precedex which also appeared to be ineffective.  Patient expresses desire to leave AMA despite thorough education and admitted to RN that he wanted to leave to drink.  Precedex was stopped at 0440 and he was monitored for a few hours prior to leaving AMA at 0658.  Of note his repeat labs this a.m. did show significant improvement.  , potassium 3.9, creatinine 2.7  Past Medical History:             Significant therapeutic interventions: see above     Significant Diagnostic Studies:   Labs / Micro:  CBC:   Lab Results   Component Value Date/Time    WBC 6.5 05/24/2024 04:10 AM    RBC 4.18 05/24/2024 04:10 AM    HGB 13.9 05/24/2024 04:10 AM    HCT 40.6 05/24/2024 04:10 AM    MCV 97.1 05/24/2024 04:10 AM    MCH 33.3 05/24/2024 04:10 AM    MCHC 34.2 05/24/2024 04:10 AM    RDW 14.0 05/24/2024 04:10 AM     05/24/2024 04:10 AM     BMP:    Lab Results   Component Value Date/Time    GLUCOSE 102

## 2024-05-24 NOTE — PROGRESS NOTES
Patient arrived to unit via wheelchair. Alert oriented x4, cooperative with staff, but still restless. Understands that he will have to stay in bed and that we will be starting medication (precedex) to help him remain safe. Placed on monitor and precedex started.

## 2024-05-24 NOTE — PROGRESS NOTES
Pt found in nurse  after turning off own bed alarm, taping his IV, and trying to open lock box to warm up his food after mistaking it for a microwave. Put back in bed and bed alarm turned back on.

## 2024-05-24 NOTE — PROGRESS NOTES
Despite significant education why he should stay in the hospital and continue to be treated by the staff, patient chose to leave AMA at 0658. He did admit that he was going to have a drink. Brought out to ED waiting area via wheelchair.

## 2024-05-24 NOTE — PROGRESS NOTES
Pt transferred to ICU, via wheelchair. Belonging gathered and taken with pt, pt verbalizes understanding. All questions and concerns addressed. Safety maintained.

## 2024-07-17 ENCOUNTER — OFFICE VISIT (OUTPATIENT)
Dept: FAMILY MEDICINE CLINIC | Age: 39
End: 2024-07-17
Payer: COMMERCIAL

## 2024-07-17 ENCOUNTER — HOSPITAL ENCOUNTER (OUTPATIENT)
Age: 39
Setting detail: SPECIMEN
Discharge: HOME | End: 2024-07-17

## 2024-07-17 VITALS
SYSTOLIC BLOOD PRESSURE: 124 MMHG | HEART RATE: 90 BPM | TEMPERATURE: 97.8 F | HEIGHT: 65 IN | BODY MASS INDEX: 26.33 KG/M2 | WEIGHT: 158 LBS | OXYGEN SATURATION: 98 % | DIASTOLIC BLOOD PRESSURE: 76 MMHG

## 2024-07-17 DIAGNOSIS — Z13.220 SCREENING FOR CHOLESTEROL LEVEL: ICD-10-CM

## 2024-07-17 DIAGNOSIS — M05.9 RHEUMATOID ARTHRITIS WITH POSITIVE RHEUMATOID FACTOR, INVOLVING UNSPECIFIED SITE (HCC): ICD-10-CM

## 2024-07-17 DIAGNOSIS — F10.10 ALCOHOL ABUSE: ICD-10-CM

## 2024-07-17 DIAGNOSIS — Z11.4 SCREENING FOR HIV (HUMAN IMMUNODEFICIENCY VIRUS): ICD-10-CM

## 2024-07-17 DIAGNOSIS — M45.9 ANKYLOSING SPONDYLITIS, UNSPECIFIED SITE OF SPINE (HCC): ICD-10-CM

## 2024-07-17 DIAGNOSIS — Z76.89 ENCOUNTER TO ESTABLISH CARE: Primary | ICD-10-CM

## 2024-07-17 DIAGNOSIS — Z00.00 ROUTINE HEALTH MAINTENANCE: ICD-10-CM

## 2024-07-17 DIAGNOSIS — Z13.31 DEPRESSION SCREENING: ICD-10-CM

## 2024-07-17 DIAGNOSIS — Z11.59 NEED FOR HEPATITIS C SCREENING TEST: ICD-10-CM

## 2024-07-17 DIAGNOSIS — I10 BENIGN ESSENTIAL HYPERTENSION: ICD-10-CM

## 2024-07-17 DIAGNOSIS — Z13.1 DIABETES MELLITUS SCREENING: ICD-10-CM

## 2024-07-17 DIAGNOSIS — N17.9 AKI (ACUTE KIDNEY INJURY) (HCC): ICD-10-CM

## 2024-07-17 DIAGNOSIS — F17.200 SMOKER: ICD-10-CM

## 2024-07-17 DIAGNOSIS — H20.13 CHRONIC IRITIS OF BOTH EYES: ICD-10-CM

## 2024-07-17 DIAGNOSIS — H20.9 IRIDOCYCLITIS ASSOCIATED WITH HLA-B27 POSITIVITY: ICD-10-CM

## 2024-07-17 PROBLEM — S82.831A CLOSED FRACTURE OF RIGHT DISTAL FIBULA: Status: RESOLVED | Noted: 2020-12-21 | Resolved: 2024-07-17

## 2024-07-17 LAB
ALBUMIN SERPL-MCNC: 4.7 G/DL (ref 3.5–5.2)
ALBUMIN/GLOB SERPL: 2 {RATIO} (ref 1–2.5)
ALP SERPL-CCNC: 99 U/L (ref 40–129)
ALT SERPL-CCNC: 14 U/L (ref 10–50)
AMPHET UR QL SCN: NEGATIVE
ANION GAP SERPL CALCULATED.3IONS-SCNC: 11 MMOL/L (ref 9–16)
AST SERPL-CCNC: 23 U/L (ref 10–50)
BARBITURATES UR QL SCN: NEGATIVE
BASOPHILS # BLD: 0.08 K/UL (ref 0–0.2)
BASOPHILS NFR BLD: 1 % (ref 0–2)
BENZODIAZ UR QL: NEGATIVE
BILIRUB SERPL-MCNC: 0.3 MG/DL (ref 0–1.2)
BILIRUB UR QL STRIP: NEGATIVE
BUN SERPL-MCNC: 10 MG/DL (ref 6–20)
CALCIUM SERPL-MCNC: 10.2 MG/DL (ref 8.6–10.4)
CANNABINOIDS UR QL SCN: POSITIVE
CHLORIDE SERPL-SCNC: 99 MMOL/L (ref 98–107)
CHOLEST SERPL-MCNC: 200 MG/DL (ref 0–199)
CHOLESTEROL/HDL RATIO: 4
CLARITY UR: CLEAR
CO2 SERPL-SCNC: 28 MMOL/L (ref 20–31)
COCAINE UR QL SCN: NEGATIVE
COLOR UR: YELLOW
COMMENT: NORMAL
CREAT SERPL-MCNC: 0.7 MG/DL (ref 0.7–1.2)
EOSINOPHIL # BLD: 0.13 K/UL (ref 0–0.44)
EOSINOPHILS RELATIVE PERCENT: 2 % (ref 1–4)
ERYTHROCYTE [DISTWIDTH] IN BLOOD BY AUTOMATED COUNT: 12.3 % (ref 11.8–14.4)
EST. AVERAGE GLUCOSE BLD GHB EST-MCNC: 108 MG/DL
FENTANYL UR QL: NEGATIVE
GFR, ESTIMATED: >90 ML/MIN/1.73M2
GGT SERPL-CCNC: 34 U/L (ref 8–61)
GLUCOSE SERPL-MCNC: 98 MG/DL (ref 74–99)
GLUCOSE UR STRIP-MCNC: NEGATIVE MG/DL
HBA1C MFR BLD: 5.4 % (ref 4–6)
HCT VFR BLD AUTO: 43.4 % (ref 40.7–50.3)
HCV AB SERPL QL IA: NONREACTIVE
HDLC SERPL-MCNC: 51 MG/DL
HGB BLD-MCNC: 14.5 G/DL (ref 13–17)
HGB UR QL STRIP.AUTO: NEGATIVE
HIV 1+2 AB+HIV1 P24 AG SERPL QL IA: NONREACTIVE
IMM GRANULOCYTES # BLD AUTO: 0.03 K/UL (ref 0–0.3)
IMM GRANULOCYTES NFR BLD: 0 %
KETONES UR STRIP-MCNC: NEGATIVE MG/DL
LDLC SERPL CALC-MCNC: 130 MG/DL (ref 0–100)
LEUKOCYTE ESTERASE UR QL STRIP: NEGATIVE
LYMPHOCYTES NFR BLD: 1.4 K/UL (ref 1.1–3.7)
LYMPHOCYTES RELATIVE PERCENT: 21 % (ref 24–43)
MAGNESIUM SERPL-MCNC: 2 MG/DL (ref 1.6–2.6)
MCH RBC QN AUTO: 33.2 PG (ref 25.2–33.5)
MCHC RBC AUTO-ENTMCNC: 33.4 G/DL (ref 28.4–34.8)
MCV RBC AUTO: 99.3 FL (ref 82.6–102.9)
METHADONE UR QL: NEGATIVE
MONOCYTES NFR BLD: 0.8 K/UL (ref 0.1–1.2)
MONOCYTES NFR BLD: 12 % (ref 3–12)
NEUTROPHILS NFR BLD: 64 % (ref 36–65)
NEUTS SEG NFR BLD: 4.36 K/UL (ref 1.5–8.1)
NITRITE UR QL STRIP: NEGATIVE
NRBC BLD-RTO: 0 PER 100 WBC
OPIATES UR QL SCN: NEGATIVE
OXYCODONE UR QL SCN: NEGATIVE
PCP UR QL SCN: NEGATIVE
PH UR STRIP: 7.5 [PH] (ref 5–8)
PLATELET # BLD AUTO: 247 K/UL (ref 138–453)
PMV BLD AUTO: 10.5 FL (ref 8.1–13.5)
POTASSIUM SERPL-SCNC: 4.7 MMOL/L (ref 3.7–5.3)
PROT SERPL-MCNC: 7.3 G/DL (ref 6.6–8.7)
PROT UR STRIP-MCNC: NEGATIVE MG/DL
RBC # BLD AUTO: 4.37 M/UL (ref 4.21–5.77)
SODIUM SERPL-SCNC: 138 MMOL/L (ref 136–145)
SP GR UR STRIP: 1.01 (ref 1–1.03)
TEST INFORMATION: ABNORMAL
TRIGL SERPL-MCNC: 94 MG/DL
TSH SERPL DL<=0.05 MIU/L-ACNC: 0.8 UIU/ML (ref 0.27–4.2)
UROBILINOGEN UR STRIP-ACNC: NORMAL EU/DL (ref 0–1)
VLDLC SERPL CALC-MCNC: 19 MG/DL
WBC OTHER # BLD: 6.8 K/UL (ref 3.5–11.3)

## 2024-07-17 PROCEDURE — 99204 OFFICE O/P NEW MOD 45 MIN: CPT | Performed by: FAMILY MEDICINE

## 2024-07-17 PROCEDURE — 3078F DIAST BP <80 MM HG: CPT | Performed by: FAMILY MEDICINE

## 2024-07-17 PROCEDURE — 3074F SYST BP LT 130 MM HG: CPT | Performed by: FAMILY MEDICINE

## 2024-07-17 RX ORDER — LISINOPRIL 20 MG/1
20 TABLET ORAL DAILY
COMMUNITY

## 2024-07-17 SDOH — ECONOMIC STABILITY: FOOD INSECURITY: WITHIN THE PAST 12 MONTHS, THE FOOD YOU BOUGHT JUST DIDN'T LAST AND YOU DIDN'T HAVE MONEY TO GET MORE.: NEVER TRUE

## 2024-07-17 SDOH — ECONOMIC STABILITY: FOOD INSECURITY: WITHIN THE PAST 12 MONTHS, YOU WORRIED THAT YOUR FOOD WOULD RUN OUT BEFORE YOU GOT MONEY TO BUY MORE.: NEVER TRUE

## 2024-07-17 SDOH — ECONOMIC STABILITY: HOUSING INSECURITY
IN THE LAST 12 MONTHS, WAS THERE A TIME WHEN YOU DID NOT HAVE A STEADY PLACE TO SLEEP OR SLEPT IN A SHELTER (INCLUDING NOW)?: NO

## 2024-07-17 SDOH — ECONOMIC STABILITY: INCOME INSECURITY: HOW HARD IS IT FOR YOU TO PAY FOR THE VERY BASICS LIKE FOOD, HOUSING, MEDICAL CARE, AND HEATING?: NOT HARD AT ALL

## 2024-07-17 ASSESSMENT — PATIENT HEALTH QUESTIONNAIRE - PHQ9
SUM OF ALL RESPONSES TO PHQ QUESTIONS 1-9: 0
2. FEELING DOWN, DEPRESSED OR HOPELESS: NOT AT ALL
SUM OF ALL RESPONSES TO PHQ9 QUESTIONS 1 & 2: 0
1. LITTLE INTEREST OR PLEASURE IN DOING THINGS: NOT AT ALL
SUM OF ALL RESPONSES TO PHQ QUESTIONS 1-9: 0

## 2024-07-17 ASSESSMENT — ENCOUNTER SYMPTOMS
EYE PAIN: 1
EYE REDNESS: 1
RESPIRATORY NEGATIVE: 1
GASTROINTESTINAL NEGATIVE: 1
ALLERGIC/IMMUNOLOGIC NEGATIVE: 1

## 2024-07-17 NOTE — PROGRESS NOTES
[facility]     Date of Visit:  2024  Patient Name: Abdifatah De Anda   Patient :  1985     CHIEF COMPLAINT:       Abdifatah De Anda is a 39 y.o. male who presents today for an general visit to be evaluated for the following condition(s):      Chief Complaint   Patient presents with    Establish Care     New to clinic and provided.    General Check Up / Physical     Due for routine health maintenance items.    Alcohol Problem     Wants help to quit drinking.     Follow-Up from Hospital     Was in the hospital -2024 - he left AMA.       HISTORY OF PRESENT ILLNESS:         New patient to the clinic.  Inpatient recently in May.      Summary as follows: \"Hospital Course:  Abdifatah De Anda is a 39 y.o. male who was admitted for the management of  CHRISTINA (acute kidney injury) (HCC) , presented to ER with Abnormal Lab (Labs yesterday states in kidney failure)  patient reports that he developed abdominal pain nausea vomiting and diarrhea about 5 days ago and subsequently had difficulty eating or drinking anything.  He initially went to urgent care was given Zofran and IV fluids and sent home.  Patient states he went to work yesterday morning but returned to urgent care after he was not feeling well and was given additional IV fluids and had outpatient labs.  He states he was notified by the urgent care today that his creatinine was 5.8 and he was directed to the ER for further evaluation.  He denies any history of CKD.  Patient is a daily drinker and states he drinks 2 double shots and a tall boy every night during the week and often binge drinks over the weekend.  He states he did have an episode of binge drinking prior to his symptoms starting.  He denies any recent sick contacts or new medications.  He states he did have a history of heroin abuse but has not used for over 13 years.  He started drinking larger amounts around that time and he has been drinking  progressively more since

## 2024-08-08 ENCOUNTER — TELEPHONE (OUTPATIENT)
Dept: FAMILY MEDICINE CLINIC | Age: 39
End: 2024-08-08

## 2024-08-08 NOTE — TELEPHONE ENCOUNTER
Abdifatah De Anda was contacted by Frandy Nam MA, a Community Health Navigator, regarding a Social Determinants of Health referral.     Patient has not responded to writer's 3 contact attempts.    Will close referral.

## 2024-09-27 ENCOUNTER — HOSPITAL ENCOUNTER (INPATIENT)
Age: 39
LOS: 4 days | Discharge: HOME | End: 2024-10-01
Attending: STUDENT IN AN ORGANIZED HEALTH CARE EDUCATION/TRAINING PROGRAM | Admitting: STUDENT IN AN ORGANIZED HEALTH CARE EDUCATION/TRAINING PROGRAM
Payer: COMMERCIAL

## 2024-09-27 ENCOUNTER — APPOINTMENT (OUTPATIENT)
Dept: CT IMAGING | Age: 39
End: 2024-09-27
Payer: COMMERCIAL

## 2024-09-27 DIAGNOSIS — K57.32 SIGMOID DIVERTICULITIS: Primary | ICD-10-CM

## 2024-09-27 PROBLEM — K57.92 DIVERTICULITIS: Status: ACTIVE | Noted: 2024-09-27

## 2024-09-27 LAB
ALBUMIN SERPL-MCNC: 3.8 G/DL (ref 3.5–5.2)
ALP SERPL-CCNC: 99 U/L (ref 40–129)
ALT SERPL-CCNC: 10 U/L (ref 5–41)
ANION GAP SERPL CALCULATED.3IONS-SCNC: 20 MMOL/L (ref 9–17)
AST SERPL-CCNC: 12 U/L
BASOPHILS # BLD: 0 K/UL (ref 0–0.2)
BASOPHILS NFR BLD: 0 %
BILIRUB SERPL-MCNC: 0.4 MG/DL (ref 0.3–1.2)
BUN SERPL-MCNC: 4 MG/DL (ref 6–20)
BUN/CREAT SERPL: 8 (ref 9–20)
CALCIUM SERPL-MCNC: 8.9 MG/DL (ref 8.6–10.4)
CHLORIDE SERPL-SCNC: 91 MMOL/L (ref 98–107)
CO2 SERPL-SCNC: 21 MMOL/L (ref 20–31)
CREAT SERPL-MCNC: 0.5 MG/DL (ref 0.7–1.2)
EOSINOPHIL # BLD: 0 K/UL (ref 0–0.4)
EOSINOPHILS RELATIVE PERCENT: 0 % (ref 1–4)
ERYTHROCYTE [DISTWIDTH] IN BLOOD BY AUTOMATED COUNT: 12.4 % (ref 11.8–14.4)
GFR, ESTIMATED: >90 ML/MIN/1.73M2
GLUCOSE SERPL-MCNC: 97 MG/DL (ref 70–99)
HCT VFR BLD AUTO: 41.7 % (ref 40.7–50.3)
HGB BLD-MCNC: 14.8 G/DL (ref 13–17)
IMM GRANULOCYTES # BLD AUTO: 0 K/UL (ref 0–0.3)
IMM GRANULOCYTES NFR BLD: 0 %
LACTATE BLDV-SCNC: 1.3 MMOL/L (ref 0.5–1.9)
LACTATE BLDV-SCNC: 2.1 MMOL/L (ref 0.5–1.9)
LIPASE SERPL-CCNC: 16 U/L (ref 13–60)
LYMPHOCYTES NFR BLD: 0.53 K/UL (ref 1–4.8)
LYMPHOCYTES RELATIVE PERCENT: 4 % (ref 24–44)
MCH RBC QN AUTO: 32.5 PG (ref 25.2–33.5)
MCHC RBC AUTO-ENTMCNC: 35.5 G/DL (ref 28.4–34.8)
MCV RBC AUTO: 91.6 FL (ref 82.6–102.9)
MONOCYTES NFR BLD: 0.79 K/UL (ref 0.2–0.8)
MONOCYTES NFR BLD: 6 % (ref 1–7)
NEUTROPHILS NFR BLD: 90 % (ref 36–66)
NEUTS SEG NFR BLD: 11.88 K/UL (ref 1.8–7.7)
NRBC BLD-RTO: 0 PER 100 WBC
PLATELET # BLD AUTO: 254 K/UL (ref 138–453)
PMV BLD AUTO: 9.9 FL (ref 8.1–13.5)
POTASSIUM SERPL-SCNC: 3.9 MMOL/L (ref 3.7–5.3)
PROT SERPL-MCNC: 6.8 G/DL (ref 6.4–8.3)
RBC # BLD AUTO: 4.55 M/UL (ref 4.21–5.77)
SODIUM SERPL-SCNC: 132 MMOL/L (ref 135–144)
WBC OTHER # BLD: 13.2 K/UL (ref 3.5–11.3)

## 2024-09-27 PROCEDURE — 87040 BLOOD CULTURE FOR BACTERIA: CPT

## 2024-09-27 PROCEDURE — 99222 1ST HOSP IP/OBS MODERATE 55: CPT

## 2024-09-27 PROCEDURE — 6360000002 HC RX W HCPCS: Performed by: NURSE PRACTITIONER

## 2024-09-27 PROCEDURE — 96375 TX/PRO/DX INJ NEW DRUG ADDON: CPT

## 2024-09-27 PROCEDURE — 83690 ASSAY OF LIPASE: CPT

## 2024-09-27 PROCEDURE — 2580000003 HC RX 258

## 2024-09-27 PROCEDURE — 6360000002 HC RX W HCPCS: Performed by: STUDENT IN AN ORGANIZED HEALTH CARE EDUCATION/TRAINING PROGRAM

## 2024-09-27 PROCEDURE — 96376 TX/PRO/DX INJ SAME DRUG ADON: CPT

## 2024-09-27 PROCEDURE — 96374 THER/PROPH/DIAG INJ IV PUSH: CPT

## 2024-09-27 PROCEDURE — 86225 DNA ANTIBODY NATIVE: CPT

## 2024-09-27 PROCEDURE — 2060000000 HC ICU INTERMEDIATE R&B

## 2024-09-27 PROCEDURE — 99285 EMERGENCY DEPT VISIT HI MDM: CPT

## 2024-09-27 PROCEDURE — 6370000000 HC RX 637 (ALT 250 FOR IP)

## 2024-09-27 PROCEDURE — 86038 ANTINUCLEAR ANTIBODIES: CPT

## 2024-09-27 PROCEDURE — 85025 COMPLETE CBC W/AUTO DIFF WBC: CPT

## 2024-09-27 PROCEDURE — 2580000003 HC RX 258: Performed by: STUDENT IN AN ORGANIZED HEALTH CARE EDUCATION/TRAINING PROGRAM

## 2024-09-27 PROCEDURE — 6360000004 HC RX CONTRAST MEDICATION: Performed by: STUDENT IN AN ORGANIZED HEALTH CARE EDUCATION/TRAINING PROGRAM

## 2024-09-27 PROCEDURE — 74177 CT ABD & PELVIS W/CONTRAST: CPT

## 2024-09-27 PROCEDURE — 80053 COMPREHEN METABOLIC PANEL: CPT

## 2024-09-27 PROCEDURE — 36415 COLL VENOUS BLD VENIPUNCTURE: CPT

## 2024-09-27 PROCEDURE — 83605 ASSAY OF LACTIC ACID: CPT

## 2024-09-27 RX ORDER — 0.9 % SODIUM CHLORIDE 0.9 %
1000 INTRAVENOUS SOLUTION INTRAVENOUS ONCE
Status: COMPLETED | OUTPATIENT
Start: 2024-09-27 | End: 2024-09-27

## 2024-09-27 RX ORDER — ONDANSETRON 2 MG/ML
4 INJECTION INTRAMUSCULAR; INTRAVENOUS EVERY 6 HOURS PRN
Status: DISCONTINUED | OUTPATIENT
Start: 2024-09-27 | End: 2024-10-01 | Stop reason: HOSPADM

## 2024-09-27 RX ORDER — MORPHINE SULFATE 4 MG/ML
4 INJECTION, SOLUTION INTRAMUSCULAR; INTRAVENOUS ONCE
Status: COMPLETED | OUTPATIENT
Start: 2024-09-27 | End: 2024-09-27

## 2024-09-27 RX ORDER — ONDANSETRON 2 MG/ML
4 INJECTION INTRAMUSCULAR; INTRAVENOUS ONCE
Status: COMPLETED | OUTPATIENT
Start: 2024-09-27 | End: 2024-09-27

## 2024-09-27 RX ORDER — IOPAMIDOL 755 MG/ML
75 INJECTION, SOLUTION INTRAVASCULAR
Status: COMPLETED | OUTPATIENT
Start: 2024-09-27 | End: 2024-09-27

## 2024-09-27 RX ORDER — POTASSIUM CHLORIDE 1500 MG/1
40 TABLET, EXTENDED RELEASE ORAL PRN
Status: DISCONTINUED | OUTPATIENT
Start: 2024-09-27 | End: 2024-10-01 | Stop reason: HOSPADM

## 2024-09-27 RX ORDER — HYDROMORPHONE HYDROCHLORIDE 1 MG/ML
1 INJECTION, SOLUTION INTRAMUSCULAR; INTRAVENOUS; SUBCUTANEOUS
Status: DISCONTINUED | OUTPATIENT
Start: 2024-09-27 | End: 2024-10-01 | Stop reason: HOSPADM

## 2024-09-27 RX ORDER — SODIUM CHLORIDE 0.9 % (FLUSH) 0.9 %
10 SYRINGE (ML) INJECTION PRN
Status: DISCONTINUED | OUTPATIENT
Start: 2024-09-27 | End: 2024-10-01 | Stop reason: HOSPADM

## 2024-09-27 RX ORDER — CIPROFLOXACIN 2 MG/ML
200 INJECTION, SOLUTION INTRAVENOUS ONCE
Status: COMPLETED | OUTPATIENT
Start: 2024-09-27 | End: 2024-09-27

## 2024-09-27 RX ORDER — 0.9 % SODIUM CHLORIDE 0.9 %
80 INTRAVENOUS SOLUTION INTRAVENOUS ONCE
Status: COMPLETED | OUTPATIENT
Start: 2024-09-27 | End: 2024-09-27

## 2024-09-27 RX ORDER — METRONIDAZOLE 500 MG/100ML
500 INJECTION, SOLUTION INTRAVENOUS EVERY 8 HOURS
Status: DISCONTINUED | OUTPATIENT
Start: 2024-09-28 | End: 2024-10-01 | Stop reason: HOSPADM

## 2024-09-27 RX ORDER — MAGNESIUM SULFATE 1 G/100ML
1000 INJECTION INTRAVENOUS PRN
Status: DISCONTINUED | OUTPATIENT
Start: 2024-09-27 | End: 2024-10-01 | Stop reason: HOSPADM

## 2024-09-27 RX ORDER — ACETAMINOPHEN 650 MG/1
650 SUPPOSITORY RECTAL EVERY 6 HOURS PRN
Status: DISCONTINUED | OUTPATIENT
Start: 2024-09-27 | End: 2024-10-01 | Stop reason: HOSPADM

## 2024-09-27 RX ORDER — NICOTINE 21 MG/24HR
1 PATCH, TRANSDERMAL 24 HOURS TRANSDERMAL DAILY
Status: DISCONTINUED | OUTPATIENT
Start: 2024-09-27 | End: 2024-10-01 | Stop reason: HOSPADM

## 2024-09-27 RX ORDER — POLYETHYLENE GLYCOL 3350 17 G/17G
17 POWDER, FOR SOLUTION ORAL DAILY PRN
Status: DISCONTINUED | OUTPATIENT
Start: 2024-09-27 | End: 2024-10-01 | Stop reason: HOSPADM

## 2024-09-27 RX ORDER — FAMOTIDINE 20 MG/1
20 TABLET, FILM COATED ORAL 2 TIMES DAILY
Status: DISCONTINUED | OUTPATIENT
Start: 2024-09-27 | End: 2024-10-01 | Stop reason: HOSPADM

## 2024-09-27 RX ORDER — ACETAMINOPHEN 325 MG/1
650 TABLET ORAL EVERY 6 HOURS PRN
Status: DISCONTINUED | OUTPATIENT
Start: 2024-09-27 | End: 2024-10-01 | Stop reason: HOSPADM

## 2024-09-27 RX ORDER — SODIUM CHLORIDE 9 MG/ML
INJECTION, SOLUTION INTRAVENOUS PRN
Status: DISCONTINUED | OUTPATIENT
Start: 2024-09-27 | End: 2024-10-01 | Stop reason: HOSPADM

## 2024-09-27 RX ORDER — CIPROFLOXACIN 2 MG/ML
400 INJECTION, SOLUTION INTRAVENOUS ONCE
Status: DISCONTINUED | OUTPATIENT
Start: 2024-09-27 | End: 2024-09-27

## 2024-09-27 RX ORDER — HYDROMORPHONE HYDROCHLORIDE 1 MG/ML
1 INJECTION, SOLUTION INTRAMUSCULAR; INTRAVENOUS; SUBCUTANEOUS ONCE
Status: COMPLETED | OUTPATIENT
Start: 2024-09-27 | End: 2024-09-27

## 2024-09-27 RX ORDER — POTASSIUM CHLORIDE 7.45 MG/ML
10 INJECTION INTRAVENOUS PRN
Status: DISCONTINUED | OUTPATIENT
Start: 2024-09-27 | End: 2024-10-01 | Stop reason: HOSPADM

## 2024-09-27 RX ORDER — METRONIDAZOLE 500 MG/100ML
500 INJECTION, SOLUTION INTRAVENOUS ONCE
Status: COMPLETED | OUTPATIENT
Start: 2024-09-27 | End: 2024-09-27

## 2024-09-27 RX ORDER — FAMOTIDINE 20 MG/1
TABLET, FILM COATED ORAL
Status: DISPENSED
Start: 2024-09-27 | End: 2024-09-28

## 2024-09-27 RX ORDER — SODIUM CHLORIDE 0.9 % (FLUSH) 0.9 %
5-40 SYRINGE (ML) INJECTION EVERY 12 HOURS SCHEDULED
Status: DISCONTINUED | OUTPATIENT
Start: 2024-09-27 | End: 2024-10-01 | Stop reason: HOSPADM

## 2024-09-27 RX ORDER — ONDANSETRON 4 MG/1
4 TABLET, ORALLY DISINTEGRATING ORAL EVERY 8 HOURS PRN
Status: DISCONTINUED | OUTPATIENT
Start: 2024-09-27 | End: 2024-10-01 | Stop reason: HOSPADM

## 2024-09-27 RX ORDER — ENOXAPARIN SODIUM 100 MG/ML
40 INJECTION SUBCUTANEOUS DAILY
Status: DISCONTINUED | OUTPATIENT
Start: 2024-09-28 | End: 2024-10-01 | Stop reason: HOSPADM

## 2024-09-27 RX ORDER — SODIUM CHLORIDE 0.9 % (FLUSH) 0.9 %
10 SYRINGE (ML) INJECTION PRN
Status: DISCONTINUED | OUTPATIENT
Start: 2024-09-27 | End: 2024-09-28 | Stop reason: SDUPTHER

## 2024-09-27 RX ORDER — SODIUM CHLORIDE 9 MG/ML
INJECTION, SOLUTION INTRAVENOUS CONTINUOUS
Status: DISCONTINUED | OUTPATIENT
Start: 2024-09-27 | End: 2024-10-01 | Stop reason: HOSPADM

## 2024-09-27 RX ADMIN — SODIUM CHLORIDE 1000 ML: 9 INJECTION, SOLUTION INTRAVENOUS at 19:56

## 2024-09-27 RX ADMIN — CIPROFLOXACIN 200 MG: 2 INJECTION, SOLUTION INTRAVENOUS at 21:43

## 2024-09-27 RX ADMIN — MORPHINE SULFATE 4 MG: 4 INJECTION, SOLUTION INTRAMUSCULAR; INTRAVENOUS at 19:54

## 2024-09-27 RX ADMIN — IOPAMIDOL 75 ML: 755 INJECTION, SOLUTION INTRAVENOUS at 20:29

## 2024-09-27 RX ADMIN — HYDROMORPHONE HYDROCHLORIDE 1 MG: 1 INJECTION, SOLUTION INTRAMUSCULAR; INTRAVENOUS; SUBCUTANEOUS at 20:48

## 2024-09-27 RX ADMIN — ONDANSETRON 4 MG: 2 INJECTION, SOLUTION INTRAMUSCULAR; INTRAVENOUS at 20:48

## 2024-09-27 RX ADMIN — FAMOTIDINE 20 MG: 20 TABLET, FILM COATED ORAL at 23:40

## 2024-09-27 RX ADMIN — HYDROMORPHONE HYDROCHLORIDE 1 MG: 1 INJECTION, SOLUTION INTRAMUSCULAR; INTRAVENOUS; SUBCUTANEOUS at 23:40

## 2024-09-27 RX ADMIN — CIPROFLOXACIN 200 MG: 2 INJECTION, SOLUTION INTRAVENOUS at 22:35

## 2024-09-27 RX ADMIN — METRONIDAZOLE 500 MG: 500 INJECTION, SOLUTION INTRAVENOUS at 22:04

## 2024-09-27 RX ADMIN — SODIUM CHLORIDE, PRESERVATIVE FREE 10 ML: 5 INJECTION INTRAVENOUS at 20:29

## 2024-09-27 RX ADMIN — SODIUM CHLORIDE: 9 INJECTION, SOLUTION INTRAVENOUS at 23:46

## 2024-09-27 RX ADMIN — ONDANSETRON 4 MG: 2 INJECTION, SOLUTION INTRAMUSCULAR; INTRAVENOUS at 19:54

## 2024-09-27 RX ADMIN — SODIUM CHLORIDE 80 ML: 9 INJECTION, SOLUTION INTRAVENOUS at 20:29

## 2024-09-27 ASSESSMENT — PAIN DESCRIPTION - LOCATION
LOCATION: ABDOMEN

## 2024-09-27 ASSESSMENT — PAIN DESCRIPTION - DESCRIPTORS
DESCRIPTORS: CRAMPING
DESCRIPTORS: CRAMPING
DESCRIPTORS: SHARP;SHOOTING
DESCRIPTORS: CRAMPING

## 2024-09-27 ASSESSMENT — PAIN SCALES - GENERAL
PAINLEVEL_OUTOF10: 9
PAINLEVEL_OUTOF10: 10
PAINLEVEL_OUTOF10: 8
PAINLEVEL_OUTOF10: 10
PAINLEVEL_OUTOF10: 10

## 2024-09-27 ASSESSMENT — PAIN - FUNCTIONAL ASSESSMENT
PAIN_FUNCTIONAL_ASSESSMENT: PREVENTS OR INTERFERES SOME ACTIVE ACTIVITIES AND ADLS
PAIN_FUNCTIONAL_ASSESSMENT: 0-10
PAIN_FUNCTIONAL_ASSESSMENT: 0-10

## 2024-09-27 ASSESSMENT — PAIN DESCRIPTION - ORIENTATION: ORIENTATION: MID

## 2024-09-27 ASSESSMENT — PAIN DESCRIPTION - PAIN TYPE: TYPE: ACUTE PAIN

## 2024-09-27 NOTE — ED PROVIDER NOTES
[facility] EMERGENCY DEPARTMENT ENCOUNTER      Pt Name: Abdifatah De Anda  MRN: [id number]  Birthdate 1985  Date of evaluation: 9/27/24    CHIEF COMPLAINT       Chief Complaint   Patient presents with    Abdominal Pain     Pt reports being diagnosed w/ diverticulitis. Was discharged from [facility] today for same issue. Pt states that the pain has sice gotten worse.        HISTORY OF PRESENT ILLNESS   Abdifatah De Anda is a 39 y.o. male who presents with history of diverticulitis.  Started 1 week and a half ago.  Initial CT scan showing sigmoid diverticulitis.  States pain initially was mild but reports it is continued to worsen.  Was recently abs that [facility].  Discharged today but states his pain is significantly worsening.  History of heroin use years prior as well as ankylosing spondylolithiasis.  States he has been on antibiotics with minimal improvement.    PASTMEDICAL HISTORY     Past Medical History:   Diagnosis Date    Alcohol abuse 05/23/2024    Ankylosing spondylitis ([facility]) 05/27/2016    Anxiety     Arthritis, rheumatoid ([facility]) 2014    Benign essential hypertension 07/17/2024    Chronic iritis of both eyes 05/18/2016    Last Assessment & Plan:   Formatting of this note might be different from the original.  Quiet today; Will follow closely; D/C PF1%  Formatting of this note might be different from the original.  Last Assessment & Plan:   Formatting of this note might be different from the original.  Quiet today; Will follow closely; D/C PF1%    Closed fracture of right distal fibula 12/21/2020    Foraminal stenosis of lumbar region 09/02/2020    Heartburn     Heroin abuse ([facility])     Quit - none in years.    Iritis     Sacroiliitis ([facility]) 12/19/2013    Spondylolisthesis, acquired 09/02/2020     Past Problem List  Patient Active Problem List   Diagnosis Code    Spondylolisthesis, acquired M43.10    Ankylosing spondylitis ([facility]) M45.9    Chronic iritis of both eyes H20.13    Dry eyes H04.123    Floaters   Comment: 3-4 shots and 5-6 beers per night    Drug use: Yes     Types: Marijuana (Weed)       PHYSICAL EXAM     INITIAL VITALS: /72   Pulse 84   Temp 98.2 °F (36.8 °C) (Oral)   Resp 18   Wt 70.4 kg (155 lb 3.2 oz)   SpO2 97%   BMI 25.83 kg/m²    Physical Exam  Vitals and nursing note reviewed.   Constitutional:       General: He is not in acute distress.     Appearance: He is well-developed. He is ill-appearing. He is not toxic-appearing.   HENT:      Head: Normocephalic and atraumatic.      Nose: Nose normal.      Mouth/Throat:      Mouth: Mucous membranes are moist.   Eyes:      General: No scleral icterus.     Conjunctiva/sclera: Conjunctivae normal.      Pupils: Pupils are equal, round, and reactive to light.   Cardiovascular:      Rate and Rhythm: Normal rate and regular rhythm.   Pulmonary:      Effort: Pulmonary effort is normal. No respiratory distress.   Abdominal:      Comments: Voluntary guarding, tender to entire abdominal palpation   Musculoskeletal:         General: Normal range of motion.   Skin:     General: Skin is warm and dry.      Findings: No erythema or rash.   Neurological:      Mental Status: He is alert and oriented to person, place, and time.   Psychiatric:         Behavior: Behavior normal.         MEDICAL DECISION MAKING / ED COURSE:   Summary of Patient Presentation:      1)  Number and Complexity of Problems  Problem List This Visit:    1. Sigmoid diverticulitis        Differential Diagnosis: Diverticulitis versus abscess versus sepsis versus autoimmune    Diagnoses Considered but Do Not Suspect: Sepsis    Pertinent Comorbid Conditions:    Past Medical History:   Diagnosis Date    Alcohol abuse 05/23/2024    Ankylosing spondylitis (HCC) 05/27/2016    Anxiety     Arthritis, rheumatoid ([facility]) 2014    Benign essential hypertension 07/17/2024    Chronic iritis of both eyes 05/18/2016    Last Assessment & Plan:   Formatting of this note might be different from the original.

## 2024-09-28 PROBLEM — F11.11 HISTORY OF OPIOID ABUSE (HCC): Status: ACTIVE | Noted: 2024-09-28

## 2024-09-28 LAB
ALBUMIN SERPL-MCNC: 3.3 G/DL (ref 3.5–5.2)
ALP SERPL-CCNC: 84 U/L (ref 40–129)
ALT SERPL-CCNC: 9 U/L (ref 5–41)
AMPHET UR QL SCN: NEGATIVE
ANION GAP SERPL CALCULATED.3IONS-SCNC: 13 MMOL/L (ref 9–17)
AST SERPL-CCNC: 13 U/L
BARBITURATES UR QL SCN: NEGATIVE
BASOPHILS # BLD: 0.05 K/UL (ref 0–0.2)
BASOPHILS NFR BLD: 0 % (ref 0–2)
BENZODIAZ UR QL: POSITIVE
BILIRUB SERPL-MCNC: 0.4 MG/DL (ref 0.3–1.2)
BUN SERPL-MCNC: 3 MG/DL (ref 6–20)
BUN/CREAT SERPL: 5 (ref 9–20)
CALCIUM SERPL-MCNC: 8.4 MG/DL (ref 8.6–10.4)
CANNABINOIDS UR QL SCN: POSITIVE
CHLORIDE SERPL-SCNC: 95 MMOL/L (ref 98–107)
CO2 SERPL-SCNC: 25 MMOL/L (ref 20–31)
COCAINE UR QL SCN: NEGATIVE
CREAT SERPL-MCNC: 0.6 MG/DL (ref 0.7–1.2)
DATE, STOOL #1: NORMAL
EOSINOPHIL # BLD: <0.03 K/UL (ref 0–0.44)
EOSINOPHILS RELATIVE PERCENT: 0 % (ref 1–4)
ERYTHROCYTE [DISTWIDTH] IN BLOOD BY AUTOMATED COUNT: 12.6 % (ref 11.8–14.4)
ERYTHROCYTE [SEDIMENTATION RATE] IN BLOOD BY PHOTOMETRIC METHOD: 36 MM/HR (ref 0–15)
FENTANYL UR QL: NEGATIVE
GFR, ESTIMATED: >90 ML/MIN/1.73M2
GLUCOSE SERPL-MCNC: 112 MG/DL (ref 70–99)
HAV IGM SERPL QL IA: NONREACTIVE
HBV CORE IGM SERPL QL IA: NONREACTIVE
HBV SURFACE AG SERPL QL IA: NONREACTIVE
HCT VFR BLD AUTO: 39.1 % (ref 40.7–50.3)
HCV AB SERPL QL IA: NONREACTIVE
HEMOCCULT SP1 STL QL: NEGATIVE
HGB BLD-MCNC: 13.4 G/DL (ref 13–17)
IMM GRANULOCYTES # BLD AUTO: 0.06 K/UL (ref 0–0.3)
IMM GRANULOCYTES NFR BLD: 0 %
LACTATE BLDV-SCNC: 0.9 MMOL/L (ref 0.5–1.9)
LIPASE SERPL-CCNC: 16 U/L (ref 13–60)
LYMPHOCYTES NFR BLD: 0.82 K/UL (ref 1.1–3.7)
LYMPHOCYTES RELATIVE PERCENT: 6 % (ref 24–43)
MAGNESIUM SERPL-MCNC: 1.6 MG/DL (ref 1.6–2.6)
MCH RBC QN AUTO: 32.3 PG (ref 25.2–33.5)
MCHC RBC AUTO-ENTMCNC: 34.3 G/DL (ref 28.4–34.8)
MCV RBC AUTO: 94.2 FL (ref 82.6–102.9)
METHADONE UR QL: NEGATIVE
MONOCYTES NFR BLD: 0.84 K/UL (ref 0.1–1.2)
MONOCYTES NFR BLD: 6 % (ref 3–12)
NEUTROPHILS NFR BLD: 88 % (ref 36–65)
NEUTS SEG NFR BLD: 12.64 K/UL (ref 1.5–8.1)
NRBC BLD-RTO: 0 PER 100 WBC
OPIATES UR QL SCN: POSITIVE
OXYCODONE UR QL SCN: POSITIVE
PCP UR QL SCN: NEGATIVE
PHOSPHATE SERPL-MCNC: 2.6 MG/DL (ref 2.5–4.5)
PLATELET # BLD AUTO: 213 K/UL (ref 138–453)
PMV BLD AUTO: 10.3 FL (ref 8.1–13.5)
POTASSIUM SERPL-SCNC: 3.5 MMOL/L (ref 3.7–5.3)
PROT SERPL-MCNC: 6 G/DL (ref 6.4–8.3)
RBC # BLD AUTO: 4.15 M/UL (ref 4.21–5.77)
SODIUM SERPL-SCNC: 133 MMOL/L (ref 135–144)
TEST INFORMATION: ABNORMAL
TIME, STOOL #1: 1745
WBC OTHER # BLD: 14.4 K/UL (ref 3.5–11.3)

## 2024-09-28 PROCEDURE — 2580000003 HC RX 258

## 2024-09-28 PROCEDURE — 85652 RBC SED RATE AUTOMATED: CPT

## 2024-09-28 PROCEDURE — 85025 COMPLETE CBC W/AUTO DIFF WBC: CPT

## 2024-09-28 PROCEDURE — 6360000002 HC RX W HCPCS

## 2024-09-28 PROCEDURE — 83605 ASSAY OF LACTIC ACID: CPT

## 2024-09-28 PROCEDURE — 84100 ASSAY OF PHOSPHORUS: CPT

## 2024-09-28 PROCEDURE — 86225 DNA ANTIBODY NATIVE: CPT

## 2024-09-28 PROCEDURE — 80307 DRUG TEST PRSMV CHEM ANLYZR: CPT

## 2024-09-28 PROCEDURE — 83735 ASSAY OF MAGNESIUM: CPT

## 2024-09-28 PROCEDURE — 83690 ASSAY OF LIPASE: CPT

## 2024-09-28 PROCEDURE — 80053 COMPREHEN METABOLIC PANEL: CPT

## 2024-09-28 PROCEDURE — 83630 LACTOFERRIN FECAL (QUAL): CPT

## 2024-09-28 PROCEDURE — 86038 ANTINUCLEAR ANTIBODIES: CPT

## 2024-09-28 PROCEDURE — 2060000000 HC ICU INTERMEDIATE R&B

## 2024-09-28 PROCEDURE — 86431 RHEUMATOID FACTOR QUANT: CPT

## 2024-09-28 PROCEDURE — 80074 ACUTE HEPATITIS PANEL: CPT

## 2024-09-28 PROCEDURE — 36415 COLL VENOUS BLD VENIPUNCTURE: CPT

## 2024-09-28 PROCEDURE — 82270 OCCULT BLOOD FECES: CPT

## 2024-09-28 PROCEDURE — 6370000000 HC RX 637 (ALT 250 FOR IP)

## 2024-09-28 PROCEDURE — 6360000002 HC RX W HCPCS: Performed by: NURSE PRACTITIONER

## 2024-09-28 PROCEDURE — 99232 SBSQ HOSP IP/OBS MODERATE 35: CPT | Performed by: STUDENT IN AN ORGANIZED HEALTH CARE EDUCATION/TRAINING PROGRAM

## 2024-09-28 RX ORDER — LORAZEPAM 2 MG/ML
4 INJECTION INTRAMUSCULAR
Status: DISCONTINUED | OUTPATIENT
Start: 2024-09-28 | End: 2024-10-01 | Stop reason: HOSPADM

## 2024-09-28 RX ORDER — LORAZEPAM 1 MG/1
1 TABLET ORAL
Status: DISCONTINUED | OUTPATIENT
Start: 2024-09-28 | End: 2024-10-01 | Stop reason: HOSPADM

## 2024-09-28 RX ORDER — LORAZEPAM 2 MG/ML
1 INJECTION INTRAMUSCULAR
Status: DISCONTINUED | OUTPATIENT
Start: 2024-09-28 | End: 2024-10-01 | Stop reason: HOSPADM

## 2024-09-28 RX ORDER — LORAZEPAM 2 MG/ML
2 INJECTION INTRAMUSCULAR
Status: DISCONTINUED | OUTPATIENT
Start: 2024-09-28 | End: 2024-10-01 | Stop reason: HOSPADM

## 2024-09-28 RX ORDER — LORAZEPAM 1 MG/1
3 TABLET ORAL
Status: DISCONTINUED | OUTPATIENT
Start: 2024-09-28 | End: 2024-10-01 | Stop reason: HOSPADM

## 2024-09-28 RX ORDER — POTASSIUM CHLORIDE 7.45 MG/ML
10 INJECTION INTRAVENOUS
Status: COMPLETED | OUTPATIENT
Start: 2024-09-28 | End: 2024-09-28

## 2024-09-28 RX ORDER — CIPROFLOXACIN 2 MG/ML
400 INJECTION, SOLUTION INTRAVENOUS EVERY 12 HOURS
Status: DISCONTINUED | OUTPATIENT
Start: 2024-09-28 | End: 2024-09-28 | Stop reason: SDUPTHER

## 2024-09-28 RX ORDER — LORAZEPAM 1 MG/1
4 TABLET ORAL
Status: DISCONTINUED | OUTPATIENT
Start: 2024-09-28 | End: 2024-10-01 | Stop reason: HOSPADM

## 2024-09-28 RX ORDER — THIAMINE HYDROCHLORIDE 100 MG/ML
100 INJECTION, SOLUTION INTRAMUSCULAR; INTRAVENOUS DAILY
Status: DISCONTINUED | OUTPATIENT
Start: 2024-09-28 | End: 2024-10-01 | Stop reason: HOSPADM

## 2024-09-28 RX ORDER — LORAZEPAM 2 MG/ML
3 INJECTION INTRAMUSCULAR
Status: DISCONTINUED | OUTPATIENT
Start: 2024-09-28 | End: 2024-10-01 | Stop reason: HOSPADM

## 2024-09-28 RX ORDER — LORAZEPAM 1 MG/1
2 TABLET ORAL
Status: DISCONTINUED | OUTPATIENT
Start: 2024-09-28 | End: 2024-10-01 | Stop reason: HOSPADM

## 2024-09-28 RX ORDER — CIPROFLOXACIN 2 MG/ML
200 INJECTION, SOLUTION INTRAVENOUS EVERY 12 HOURS
Status: DISCONTINUED | OUTPATIENT
Start: 2024-09-28 | End: 2024-10-01 | Stop reason: HOSPADM

## 2024-09-28 RX ADMIN — LORAZEPAM 1 MG: 2 INJECTION INTRAMUSCULAR; INTRAVENOUS at 04:08

## 2024-09-28 RX ADMIN — HYDROMORPHONE HYDROCHLORIDE 1 MG: 1 INJECTION, SOLUTION INTRAMUSCULAR; INTRAVENOUS; SUBCUTANEOUS at 12:00

## 2024-09-28 RX ADMIN — LORAZEPAM 2 MG: 2 INJECTION INTRAMUSCULAR; INTRAVENOUS at 13:44

## 2024-09-28 RX ADMIN — HYDROMORPHONE HYDROCHLORIDE 1 MG: 1 INJECTION, SOLUTION INTRAMUSCULAR; INTRAVENOUS; SUBCUTANEOUS at 03:01

## 2024-09-28 RX ADMIN — LORAZEPAM 1 MG: 2 INJECTION INTRAMUSCULAR; INTRAVENOUS at 07:06

## 2024-09-28 RX ADMIN — SODIUM CHLORIDE: 9 INJECTION, SOLUTION INTRAVENOUS at 23:16

## 2024-09-28 RX ADMIN — CIPROFLOXACIN 200 MG: 2 INJECTION, SOLUTION INTRAVENOUS at 21:09

## 2024-09-28 RX ADMIN — METRONIDAZOLE 500 MG: 500 INJECTION, SOLUTION INTRAVENOUS at 05:57

## 2024-09-28 RX ADMIN — SODIUM CHLORIDE, PRESERVATIVE FREE 10 ML: 5 INJECTION INTRAVENOUS at 20:12

## 2024-09-28 RX ADMIN — SODIUM CHLORIDE: 9 INJECTION, SOLUTION INTRAVENOUS at 06:36

## 2024-09-28 RX ADMIN — SODIUM CHLORIDE: 9 INJECTION, SOLUTION INTRAVENOUS at 22:07

## 2024-09-28 RX ADMIN — LORAZEPAM 2 MG: 2 INJECTION INTRAMUSCULAR; INTRAVENOUS at 23:10

## 2024-09-28 RX ADMIN — HYDROMORPHONE HYDROCHLORIDE 1 MG: 1 INJECTION, SOLUTION INTRAMUSCULAR; INTRAVENOUS; SUBCUTANEOUS at 15:16

## 2024-09-28 RX ADMIN — METRONIDAZOLE 500 MG: 500 INJECTION, SOLUTION INTRAVENOUS at 13:50

## 2024-09-28 RX ADMIN — SODIUM CHLORIDE: 9 INJECTION, SOLUTION INTRAVENOUS at 05:56

## 2024-09-28 RX ADMIN — HYDROMORPHONE HYDROCHLORIDE 1 MG: 1 INJECTION, SOLUTION INTRAMUSCULAR; INTRAVENOUS; SUBCUTANEOUS at 09:10

## 2024-09-28 RX ADMIN — CIPROFLOXACIN 200 MG: 2 INJECTION, SOLUTION INTRAVENOUS at 08:32

## 2024-09-28 RX ADMIN — LORAZEPAM 2 MG: 2 INJECTION INTRAMUSCULAR; INTRAVENOUS at 16:31

## 2024-09-28 RX ADMIN — CIPROFLOXACIN 200 MG: 2 INJECTION, SOLUTION INTRAVENOUS at 22:14

## 2024-09-28 RX ADMIN — HYDROMORPHONE HYDROCHLORIDE 1 MG: 1 INJECTION, SOLUTION INTRAMUSCULAR; INTRAVENOUS; SUBCUTANEOUS at 18:49

## 2024-09-28 RX ADMIN — POTASSIUM CHLORIDE 10 MEQ: 7.46 INJECTION, SOLUTION INTRAVENOUS at 10:16

## 2024-09-28 RX ADMIN — FAMOTIDINE 20 MG: 20 TABLET, FILM COATED ORAL at 20:09

## 2024-09-28 RX ADMIN — CIPROFLOXACIN 200 MG: 2 INJECTION, SOLUTION INTRAVENOUS at 10:17

## 2024-09-28 RX ADMIN — LORAZEPAM 2 MG: 2 INJECTION INTRAMUSCULAR; INTRAVENOUS at 20:09

## 2024-09-28 RX ADMIN — POTASSIUM CHLORIDE 10 MEQ: 7.46 INJECTION, SOLUTION INTRAVENOUS at 11:34

## 2024-09-28 RX ADMIN — SODIUM CHLORIDE: 9 INJECTION, SOLUTION INTRAVENOUS at 21:06

## 2024-09-28 RX ADMIN — HYDROMORPHONE HYDROCHLORIDE 1 MG: 1 INJECTION, SOLUTION INTRAMUSCULAR; INTRAVENOUS; SUBCUTANEOUS at 06:01

## 2024-09-28 RX ADMIN — SODIUM CHLORIDE: 9 INJECTION, SOLUTION INTRAVENOUS at 15:43

## 2024-09-28 RX ADMIN — LORAZEPAM 1 MG: 2 INJECTION INTRAMUSCULAR; INTRAVENOUS at 10:06

## 2024-09-28 RX ADMIN — POTASSIUM CHLORIDE 10 MEQ: 7.46 INJECTION, SOLUTION INTRAVENOUS at 06:32

## 2024-09-28 RX ADMIN — THIAMINE HYDROCHLORIDE 100 MG: 100 INJECTION, SOLUTION INTRAMUSCULAR; INTRAVENOUS at 09:12

## 2024-09-28 RX ADMIN — ENOXAPARIN SODIUM 40 MG: 100 INJECTION SUBCUTANEOUS at 09:13

## 2024-09-28 RX ADMIN — METRONIDAZOLE 500 MG: 500 INJECTION, SOLUTION INTRAVENOUS at 22:08

## 2024-09-28 RX ADMIN — HYDROMORPHONE HYDROCHLORIDE 1 MG: 1 INJECTION, SOLUTION INTRAMUSCULAR; INTRAVENOUS; SUBCUTANEOUS at 22:09

## 2024-09-28 RX ADMIN — POTASSIUM CHLORIDE 10 MEQ: 7.46 INJECTION, SOLUTION INTRAVENOUS at 08:30

## 2024-09-28 ASSESSMENT — PAIN SCALES - GENERAL
PAINLEVEL_OUTOF10: 7
PAINLEVEL_OUTOF10: 9
PAINLEVEL_OUTOF10: 0
PAINLEVEL_OUTOF10: 8
PAINLEVEL_OUTOF10: 6
PAINLEVEL_OUTOF10: 6
PAINLEVEL_OUTOF10: 0
PAINLEVEL_OUTOF10: 8
PAINLEVEL_OUTOF10: 0
PAINLEVEL_OUTOF10: 7
PAINLEVEL_OUTOF10: 0
PAINLEVEL_OUTOF10: 9
PAINLEVEL_OUTOF10: 5
PAINLEVEL_OUTOF10: 9
PAINLEVEL_OUTOF10: 3

## 2024-09-28 ASSESSMENT — PAIN - FUNCTIONAL ASSESSMENT
PAIN_FUNCTIONAL_ASSESSMENT: PREVENTS OR INTERFERES SOME ACTIVE ACTIVITIES AND ADLS
PAIN_FUNCTIONAL_ASSESSMENT: ACTIVITIES ARE NOT PREVENTED

## 2024-09-28 ASSESSMENT — PAIN DESCRIPTION - ORIENTATION
ORIENTATION: RIGHT
ORIENTATION: RIGHT;LEFT
ORIENTATION: MID
ORIENTATION: RIGHT;LEFT;MID;LOWER
ORIENTATION: RIGHT;LEFT;MID
ORIENTATION: MID
ORIENTATION: LEFT;RIGHT

## 2024-09-28 ASSESSMENT — PAIN DESCRIPTION - LOCATION
LOCATION: ABDOMEN

## 2024-09-28 ASSESSMENT — PAIN DESCRIPTION - DESCRIPTORS
DESCRIPTORS: SHARP;SHOOTING
DESCRIPTORS: SHOOTING;SHARP
DESCRIPTORS: STABBING;SHOOTING
DESCRIPTORS: SHARP
DESCRIPTORS: SHARP;SHOOTING
DESCRIPTORS: SHOOTING;SHARP
DESCRIPTORS: STABBING;SHOOTING

## 2024-09-28 NOTE — PROGRESS NOTES
Transitions of Care Pharmacy Service   Medication Review    The patient's list of current home medications has been reviewed.     Source(s) of information: patient, faraz    Based on information provided by the above source(s), I have updated the patient's home med list.     Other Notes Patient was taking naltrexone 50mg daily but stopped as his anxiety was too high and he started drinking again. He has been off the naltrexone for about 1 month. I encouraged him to speak to his doctor about getting on an SSRI or SNRI to help with his anxiety. I educated that it will take weeks to start working but it might help him to avoid some drinking, as much of his drinking is triggered by anxiety.   He was prescribed cipro 500mg BID x 7 days and flagyl 500mg TID x 7 days on 9/27/24 but hasn't started yet           Please feel free to call me with any questions about this encounter. Thank you.    Heather Hamilton RPH   Transitions of Care Pharmacy Service  Phone:  [phone]  Fax: [phone]      Electronically signed by Heather Hamilton RPH on 9/28/2024 at 5:52 PM       Medications Prior to Admission: lisinopril (PRINIVIL;ZESTRIL) 20 MG tablet, Take 1 tablet by mouth daily  omeprazole (PRILOSEC) 20 MG delayed release capsule, Take 1 capsule by mouth daily      For Pharmacy Admin Tracking Only  # meds added to list: 0  # meds removed from list: 4  # meds adjusted on list (dose/frequency/formulation): 0

## 2024-09-28 NOTE — PROGRESS NOTES
[facility]  Office: [phone]  Fady Flores DO, Chris Medina DO, Nick Yarbrough DO, Gary Wei, DO, Aileen Lloyd MD, Marie Astorga MD, Jacob Kelly MD, Zaira Estrella MD,  German Ramos MD, Vi Castillo MD, Sigifredo Schwartz MD,  Denice Streeter DO, Sebastián Suggs MD, Tye Boateng MD, Stan Flores DO, Nida Wilhelm MD,  Damir Herrera DO, Mallory Brasher MD, Rosana Escalante MD, Conchis Jiménez MD, Salazar Trinh MD,  Santos Rai MD, oJnna Delgado MD, Raymundo Ambrocio MD, Silver Fisher MD, Angel Dhaliwal MD, Michale Lewis MD, Maxwell Sifuentes DO, Camron Salas DO, Dillon Brizuela DO, Delvin Leija MD, Shirley Waterhouse, CNP,  Kasie Early CNP, Maxwell Lugo, CNP,  Desiree Hannah, [facility], Stacie Moss, CNP, Rosangela Kennedy, CNP, Lauren Franco, CNP, Layla Fritz, CNP, Leandra Vega, PA-C, Ivory Cool PA-C, Taina Cunningham, CNP, Denys Pond, CNP,  Anusha Romero, CNP, Twila Montgomery, CNP, Gretchen Rudolph, CNP, Maria De Jesus Sebastian, CNS, Michelle Thapa, [facility], Dulce Sky, CNP, Tracy Schwab, [facility]   IN-PATIENT SERVICE      Progress Note    9/28/2024    1:57 PM    Name:   Abdifatah De Anda  MRN:     [id number]     Acct:      [id number]   Room:   1007/1007-02   Day:  1  Admit Date:  9/27/2024  6:37 PM    PCP:   Brennon Reyes DO  Code Status:  Full Code    Subjective:     C/C:   Chief Complaint   Patient presents with    Abdominal Pain     Pt reports being diagnosed w/ diverticulitis. Was discharged from [facility] today for same issue. Pt states that the pain has sice gotten worse.      Interval History Status: not changed.     Patient states that he was recently discharged from [facility] for diverticulitis.  He was not started on any antibiotics at that time.  He does admit to daily drinking of beer and alcohol.  His last drink was September 26    Brief History:     Abdifatah De Anda is a 39 y.o. Non- / non  male who presents with Abdominal Pain (Pt

## 2024-09-28 NOTE — PROGRESS NOTES
[x] Medication Reconciliation was completed and the patient's home medication list was verified. The Med List Status is \"Complete\". The following sources were used to assist with Medication Reconciliation:    [] Med Rec Pharmacist already completed   [] Patient had a list of medications which was transcribed into the EHR.  [] Patient provided bottles of their medications  [x] Home medications reviewed and confirmed with patient  [] Contacted patient's pharmacy to confirm home medications  [] Contacted patient's physician office to confirm home medications  [] Medical Records from another facility and/or Care Everywhere were reviewed  [] MAR from facility requested to be faxed over  [] Unable to complete due to patient condition  [] Unable to validate home medications      [] There are one or more home medications that need clarification before Medication Reconciliation can be completed. The Med List Status has been marked as In Progress.     To assist with Home Medication Reconciliation the following actions have been taken:    [] Pharmacy medication reconciliation service requested. (Note: This can be done by sending a Perfect Serve message to The Med Rec Pharmacist or by phoning [phone].)  [] Family requested to bring medications into the hospital  [] Family requested to call hospital with medication list  [] Message left with physician office  [] Request for medical records made to   [] Other

## 2024-09-28 NOTE — PLAN OF CARE
Patient aox4  Continues to complain of abd pain.   Receiving IV flagyl and IV cipro for diverticulitis  Receiving IV dilaudid Q3  Patient is a daily drinker, CIWA is 11. Ativan given per CIWA scale  Diet advanced to low fiber, patient tolerating  GI recommending colonoscopy outpatient  VSS, call light within reach, safety maintained        Problem: Discharge Planning  Goal: Discharge to home or other facility with appropriate resources  Outcome: Progressing     Problem: Pain  Goal: Verbalizes/displays adequate comfort level or baseline comfort level  Outcome: Progressing. Patient having pain on their abdomen and rates it a 9. Pain interventions includefrequent position changes. Patients goal for pain relief is  0 . The need for pain and symptom management will be considered in the discharge planning process to ensure patients comfort.        Problem: ABCDS Injury Assessment  Goal: Absence of physical injury  Outcome: Progressing     Problem: Safety - Adult  Goal: Free from fall injury  Outcome: Progressing

## 2024-09-28 NOTE — H&P
..  [facility]  Office: [phone]  Fady Flores DO, Chris Medina DO, Nick Yarbrough DO, Gary Wei DO, Aileen Lloyd MD, Marie Astorga MD, Jacob Kelly MD, Zaira Estrella MD,  German Ramos MD, Vi Castillo MD, Sigifredo Schwartz MD,  Denice Streeter DO, Sebastián Suggs MD, Tye Boateng MD, Stan Flores DO, Nida Wilhelm MD,  Damir Herrera DO, Mallory Brasher MD, Rosana Escalante MD, Conchis Jiménez MD, Salazar Trinh MD,  Santos Rai MD, Jonna Delgado MD, Raymundo Ambrocio MD, Silver Fisher MD, Angel Dhaliwal MD, Michael Lewis MD, Maxwell Sifuentes DO, Camron Salas DO, Dillon Brizuela DO, Delvin Leija MD, Shirley Waterhouse, CNP,  Kasie Early CNP, Maxwell Lugo, CNP,  Desiree Hannah, [facility], Stacie Moss, CNP, Rosangela Kennedy, CNP, Lauren Franco, CNP, Layla Fritz, CNP, Leandra Vega, PA-C, Ivory Cool PA-C, Taina Cunningham, CNP, Denys Pond, CNP,  Anusha Romero, CNP, Twila Montgomery, CNP, Gretchen Rudolph, CNP, Maria De Jesus Sebastian, CNS, Michelle Thapa, CNP, Dulce Sky, CNP, Tracy Schwab, CNP         [facility]   IN-PATIENT SERVICE   [facility]    HISTORY AND PHYSICAL EXAMINATION            Date:   9/28/2024  Patient name:  Abdifatah De Anda  Date of admission:  9/27/2024  6:37 PM  MRN:   [id number]  Account:  [id number]  YOB: 1985  PCP:    Brennon Reyes DO  Room:   [facility]1007-  Code Status:    Full Code    Chief Complaint:     Chief Complaint   Patient presents with    Abdominal Pain     Pt reports being diagnosed w/ diverticulitis. Was discharged from [facility] today for same issue. Pt states that the pain has sice gotten worse.        History Obtained From:     patient, electronic medical record    History of Present Illness:     Abdifatah RAI Adilson is a 39 y.o. Non- / non  male who presents with Abdominal Pain (Pt reports being diagnosed w/ diverticulitis. Was discharged from [facility] today for same issue. Pt states that the  99 40 - 129 U/L    ALT 10 5 - 41 U/L    AST 12 <40 U/L   CBC with Auto Differential    Collection Time: 09/27/24  7:29 PM   Result Value Ref Range    WBC 13.2 (H) 3.5 - 11.3 k/uL    RBC 4.55 4.21 - 5.77 m/uL    Hemoglobin 14.8 13.0 - 17.0 g/dL    Hematocrit 41.7 40.7 - 50.3 %    MCV 91.6 82.6 - 102.9 fL    MCH 32.5 25.2 - 33.5 pg    MCHC 35.5 (H) 28.4 - 34.8 g/dL    RDW 12.4 11.8 - 14.4 %    Platelets 254 138 - 453 k/uL    MPV 9.9 8.1 - 13.5 fL    NRBC Automated 0.0 0.0 per 100 WBC    Neutrophils % 90 (H) 36 - 66 %    Lymphocytes % 4 (L) 24 - 44 %    Monocytes % 6 1 - 7 %    Eosinophils % 0 (L) 1 - 4 %    Basophils % 0 %    Immature Granulocytes % 0 0 %    Neutrophils Absolute 11.88 (H) 1.8 - 7.7 k/uL    Lymphocytes Absolute 0.53 (L) 1.0 - 4.8 k/uL    Monocytes Absolute 0.79 0.2 - 0.8 k/uL    Eosinophils Absolute 0.00 0.0 - 0.4 k/uL    Basophils Absolute 0.00 0.0 - 0.2 k/uL    Immature Granulocytes Absolute 0.00 0.00 - 0.30 k/uL   Lipase    Collection Time: 09/27/24  7:29 PM   Result Value Ref Range    Lipase 16 13 - 60 U/L   Lactate, Sepsis    Collection Time: 09/27/24  7:29 PM   Result Value Ref Range    Lactic Acid, Sepsis 2.1 (H) 0.5 - 1.9 mmol/L   Lactate, Sepsis    Collection Time: 09/27/24 10:00 PM   Result Value Ref Range    Lactic Acid, Sepsis 1.3 0.5 - 1.9 mmol/L   Drug screen multi urine    Collection Time: 09/28/24 12:15 AM   Result Value Ref Range    Amphetamine Screen, Ur NEGATIVE NEGATIVE    Barbiturate Screen, Ur NEGATIVE NEGATIVE    Benzodiazepine Screen, Urine POSITIVE (A) NEGATIVE    Cocaine Metabolite, Urine NEGATIVE NEGATIVE    Methadone Screen, Urine NEGATIVE NEGATIVE    Opiates, Urine POSITIVE (A) NEGATIVE    Phencyclidine, Urine NEGATIVE NEGATIVE    Cannabinoid Scrn, Ur POSITIVE (A) NEGATIVE    Oxycodone Screen, Ur POSITIVE (A) NEGATIVE    Fentanyl, Ur NEGATIVE NEGATIVE    Test Information       Assay provides medical screening only.  The absence of expected drug(s) and/or metabolite(s) may

## 2024-09-28 NOTE — PLAN OF CARE
Patient A & O x4 and ambulates independently  Vitals stable  NPO since midnight  Blood cultures drawn and pending  Cipro Q12H and Flagyl Q8H;s for the Diverticulitis  PRN Dilaudid for pain control  Patient on 2L NC, continuous pulse ox in place, O2 WDL  Patient is a daily drinker, last drink was the night of 9/27, PRN Ativan with CIWA scale ordered. Seizure precautions in place  Nicotine patch applied to right shoulder as patient is a daily smoker  GI consulted  Care ongoing    Patient having pain on their abdomen and rates it a 9. Pain interventions include relaxation techniques, medication, and regular rest periods. Patients goal for pain relief is  0 . The need for pain and symptom management will be considered in the discharge planning process to ensure patients comfort.    Problem: Discharge Planning  Goal: Discharge to home or other facility with appropriate resources  Outcome: Progressing     Problem: Pain  Goal: Verbalizes/displays adequate comfort level or baseline comfort level  Outcome: Progressing     Problem: ABCDS Injury Assessment  Goal: Absence of physical injury  Outcome: Progressing     Problem: Safety - Adult  Goal: Free from fall injury  Outcome: Progressing

## 2024-09-28 NOTE — ED NOTES
Obtained prior CT Abdomen Pelvis with IV contrast dated 9/26/24 ([facility]); uploaded to PACS for comparison.    ENS

## 2024-09-28 NOTE — CARE COORDINATION
Case Management Assessment  Initial Evaluation    Date/Time of Evaluation: 9/28/2024 4:01 PM  Assessment Completed by: EMERSON HUGHES RN    If patient is discharged prior to next notation, then this note serves as note for discharge by case management.    Patient Name: Abdifatah De Anda                   YOB: 1985  Diagnosis: Diverticulitis [K57.92]  Sigmoid diverticulitis [K57.32]                   Date / Time: 9/27/2024  6:37 PM    Patient Admission Status: Inpatient   Readmission Risk (Low < 19, Mod (19-27), High > 27): Readmission Risk Score: 9.4    Current PCP: Brennon Reyes, DO  PCP verified by CM? Yes    Chart Reviewed: Yes      History Provided by: Patient  Patient Orientation: Alert and Oriented    Patient Cognition: Alert    Hospitalization in the last 30 days (Readmission):  No    If yes, Readmission Assessment in CM Navigator will be completed.    Advance Directives:      Code Status: Full Code   Patient's Primary Decision Maker is: Legal Next of Kin      Discharge Planning:    Patient lives with: Alone Type of Home: Apartment (APT 3 D)  Primary Care Giver: Self  Patient Support Systems include: Friends/Neighbors, Family Members   Current Financial resources: Other (Comment) (MMO)  Current community resources: [employer]  Current services prior to admission: None            Current DME:              Type of Home Care services:  None    ADLS  Prior functional level: Independent in ADLs/IADLs  Current functional level: Independent in ADLs/IADLs    PT AM-PAC:   /24  OT AM-PAC:   /24    Family can provide assistance at DC: No  Would you like Case Management to discuss the discharge plan with any other family members/significant others, and if so, who? No  Plans to Return to Present Housing: Yes  Other Identified Issues/Barriers to RETURNING to current housing: detox   Potential Assistance needed at discharge: N/A            Potential DME:    Patient expects to discharge to:  Left a message for pt to make an appt with Dr CALLEJAS

## 2024-09-28 NOTE — CONSULTS
GASTROENTEROLOGY CONSULT       REASON FOR CONSULT:  \"diverticulitis\"    REQUESTING PHYSICIAN:  Crystal Barajas MD    HISTORY OF PRESENT ILLNESS:    The patient is a 39 y.o. male who presents with worsening LLQ abdominal pain, with recent radiographic evidence of sigmoid diverticulitis not improved with PO antibiotics and bowel rest at home. Repeat CT yesterday as below:    IMPRESSION:  1. Significant inflammatory stranding posterior to the mid sigmoid colon  mildly worsened from the previous study with questionable focal bowel wall  thickening.  Small volume of reactive ascites.  Findings are compatible with  acute diverticulitis. No extraluminal gas or drainable fluid collection  identified.  2. Circumferential wall thickening of a loop of jejunum within the left lower  quadrant adjacent to the sigmoid colon new from the previous exam. This is  likely reactive to the adjacent sigmoid colon inflammation.              Exam Ended: 09/27/24 20:28 EDT        His last BM was brown semi formed yesterday, no hematochezia. No past colonoscopy.   Sister may carry a diagnosis of Crohn's.   He also has significant autoimmune hx himself with rheumatoid arthritis previously on methotrexate, ankylosing spondylolithiasis, iritis of both eyes, substance abuse, hypertension, tobacco use, current alcohol abuse with 50-80 alcoholic beverages per week.     MEDICAL HISTORY:   Past Medical History:   Diagnosis Date    Alcohol abuse 05/23/2024    Ankylosing spondylitis (HCC) 05/27/2016    Anxiety     Arthritis, rheumatoid (HCC) 2014    Benign essential hypertension 07/17/2024    Chronic iritis of both eyes 05/18/2016    Last Assessment & Plan:   Formatting of this note might be different from the original.  Quiet today; Will follow closely; D/C PF1%  Formatting of this note might be different from the original.  Last Assessment & Plan:   Formatting of this note might be different from the original.  Quiet today; Will follow  closely; D/C PF1%    Closed fracture of right distal fibula 12/21/2020    Foraminal stenosis of lumbar region 09/02/2020    Heartburn     Heroin abuse (HCC)     Quit - none in years.    Iritis     Sacroiliitis (HCC) 12/19/2013    Spondylolisthesis, acquired 09/02/2020       SURGICAL HISTORY:  Past Surgical History:   Procedure Laterality Date    BACK SURGERY Bilateral 09/02/2020    L4-S1 TLIF BILATERAL DECOMPRESSION L5-S1 RIGHT DECOMPRESSION L4-5 NUVASIVE 2 C-ARMS CELLSAVER - Bilateral    CARDIAC VALVE SURGERY      as an infant    LUMBAR SPINE SURGERY Bilateral 9/2/2020    L4-S1 TLIF   BILATERAL DECOMPRESSION   L5-S1       RIGHT DECOMPRESSION L4-5   NUVASIVE    2 C-ARMS    CELLSAVER performed by Raad Salinas MD at [facility]    TONSILLECTOMY         MEDS:  Prior to Admission medications    Medication Sig Start Date End Date Taking? Authorizing Provider   lisinopril (PRINIVIL;ZESTRIL) 20 MG tablet Take 1 tablet by mouth daily   Yes Yumiko Nuñez MD   omeprazole (PRILOSEC) 20 MG delayed release capsule Take 1 capsule by mouth daily   Yes Yumiko Nuñez MD   Magnesium 400 MG CAPS Take by mouth  Patient not taking: Reported on 9/27/2024    Yumiko Nuñez MD   folic acid (FOLVITE) 1 MG tablet Take 1 tablet by mouth daily  Patient not taking: Reported on 9/27/2024    Yumiko Nuñez MD   gabapentin (NEURONTIN) 300 MG capsule Take 1 capsule by mouth 3 times daily. 11/21/22 2/14/24  Yumiko Nuñez MD   methotrexate (RHEUMATREX) 2.5 MG chemo tablet Take 8 tablets by mouth once a week  Patient not taking: Reported on 7/17/2024    Yumiko Nuñez MD     Scheduled Meds:   thiamine  100 mg IntraVENous Daily    ciprofloxacin  200 mg IntraVENous Q12H    And    ciprofloxacin  200 mg IntraVENous Q12H    sodium chloride flush  5-40 mL IntraVENous 2 times per day    famotidine  20 mg Oral BID    enoxaparin  40 mg SubCUTAneous Daily    nicotine  1 patch TransDERmal Daily    metroNIDAZOLE  500 mg

## 2024-09-28 NOTE — PROGRESS NOTES
Admitted from ER  to room 1007 .   Pt alert and oriented.   Pt oriented to call light system and agreeable to call for assistance.    Vital signs recorded    Telemetry monitor applied.   Admission documentation completed

## 2024-09-29 LAB
LACTOFERRIN STL QL: ABNORMAL
POTASSIUM SERPL-SCNC: 3.6 MMOL/L (ref 3.7–5.3)
RHEUMATOID FACT SER NEPH-ACNC: 10 IU/ML (ref 0–13)

## 2024-09-29 PROCEDURE — 6370000000 HC RX 637 (ALT 250 FOR IP)

## 2024-09-29 PROCEDURE — 99232 SBSQ HOSP IP/OBS MODERATE 35: CPT | Performed by: STUDENT IN AN ORGANIZED HEALTH CARE EDUCATION/TRAINING PROGRAM

## 2024-09-29 PROCEDURE — 6360000002 HC RX W HCPCS: Performed by: NURSE PRACTITIONER

## 2024-09-29 PROCEDURE — 36415 COLL VENOUS BLD VENIPUNCTURE: CPT

## 2024-09-29 PROCEDURE — 2060000000 HC ICU INTERMEDIATE R&B

## 2024-09-29 PROCEDURE — 6360000002 HC RX W HCPCS

## 2024-09-29 PROCEDURE — 2580000003 HC RX 258

## 2024-09-29 PROCEDURE — 84132 ASSAY OF SERUM POTASSIUM: CPT

## 2024-09-29 RX ADMIN — LORAZEPAM 3 MG: 2 INJECTION INTRAMUSCULAR; INTRAVENOUS at 06:11

## 2024-09-29 RX ADMIN — CIPROFLOXACIN 200 MG: 2 INJECTION, SOLUTION INTRAVENOUS at 09:26

## 2024-09-29 RX ADMIN — ENOXAPARIN SODIUM 40 MG: 100 INJECTION SUBCUTANEOUS at 09:22

## 2024-09-29 RX ADMIN — LORAZEPAM 1 MG: 1 TABLET ORAL at 10:41

## 2024-09-29 RX ADMIN — FAMOTIDINE 20 MG: 20 TABLET, FILM COATED ORAL at 20:17

## 2024-09-29 RX ADMIN — SODIUM CHLORIDE: 9 INJECTION, SOLUTION INTRAVENOUS at 22:35

## 2024-09-29 RX ADMIN — HYDROMORPHONE HYDROCHLORIDE 1 MG: 1 INJECTION, SOLUTION INTRAMUSCULAR; INTRAVENOUS; SUBCUTANEOUS at 20:37

## 2024-09-29 RX ADMIN — THIAMINE HYDROCHLORIDE 100 MG: 100 INJECTION, SOLUTION INTRAMUSCULAR; INTRAVENOUS at 09:23

## 2024-09-29 RX ADMIN — METRONIDAZOLE 500 MG: 500 INJECTION, SOLUTION INTRAVENOUS at 22:37

## 2024-09-29 RX ADMIN — LORAZEPAM 2 MG: 1 TABLET ORAL at 15:32

## 2024-09-29 RX ADMIN — SODIUM CHLORIDE: 9 INJECTION, SOLUTION INTRAVENOUS at 09:38

## 2024-09-29 RX ADMIN — METRONIDAZOLE 500 MG: 500 INJECTION, SOLUTION INTRAVENOUS at 14:28

## 2024-09-29 RX ADMIN — HYDROMORPHONE HYDROCHLORIDE 1 MG: 1 INJECTION, SOLUTION INTRAMUSCULAR; INTRAVENOUS; SUBCUTANEOUS at 04:41

## 2024-09-29 RX ADMIN — LORAZEPAM 2 MG: 2 INJECTION INTRAMUSCULAR; INTRAVENOUS at 02:13

## 2024-09-29 RX ADMIN — FAMOTIDINE 20 MG: 20 TABLET, FILM COATED ORAL at 09:22

## 2024-09-29 RX ADMIN — HYDROMORPHONE HYDROCHLORIDE 1 MG: 1 INJECTION, SOLUTION INTRAMUSCULAR; INTRAVENOUS; SUBCUTANEOUS at 09:32

## 2024-09-29 RX ADMIN — HYDROMORPHONE HYDROCHLORIDE 1 MG: 1 INJECTION, SOLUTION INTRAMUSCULAR; INTRAVENOUS; SUBCUTANEOUS at 14:22

## 2024-09-29 RX ADMIN — METRONIDAZOLE 500 MG: 500 INJECTION, SOLUTION INTRAVENOUS at 05:56

## 2024-09-29 RX ADMIN — LORAZEPAM 3 MG: 2 INJECTION INTRAMUSCULAR; INTRAVENOUS at 03:35

## 2024-09-29 RX ADMIN — CIPROFLOXACIN 200 MG: 2 INJECTION, SOLUTION INTRAVENOUS at 21:24

## 2024-09-29 RX ADMIN — SODIUM CHLORIDE: 9 INJECTION, SOLUTION INTRAVENOUS at 16:41

## 2024-09-29 RX ADMIN — CIPROFLOXACIN 200 MG: 2 INJECTION, SOLUTION INTRAVENOUS at 10:35

## 2024-09-29 RX ADMIN — HYDROMORPHONE HYDROCHLORIDE 1 MG: 1 INJECTION, SOLUTION INTRAMUSCULAR; INTRAVENOUS; SUBCUTANEOUS at 23:42

## 2024-09-29 RX ADMIN — HYDROMORPHONE HYDROCHLORIDE 1 MG: 1 INJECTION, SOLUTION INTRAMUSCULAR; INTRAVENOUS; SUBCUTANEOUS at 01:10

## 2024-09-29 RX ADMIN — LORAZEPAM 3 MG: 2 INJECTION INTRAMUSCULAR; INTRAVENOUS at 21:32

## 2024-09-29 RX ADMIN — CIPROFLOXACIN 200 MG: 2 INJECTION, SOLUTION INTRAVENOUS at 22:34

## 2024-09-29 RX ADMIN — HYDROMORPHONE HYDROCHLORIDE 1 MG: 1 INJECTION, SOLUTION INTRAMUSCULAR; INTRAVENOUS; SUBCUTANEOUS at 17:37

## 2024-09-29 ASSESSMENT — PAIN SCALES - GENERAL
PAINLEVEL_OUTOF10: 7
PAINLEVEL_OUTOF10: 1
PAINLEVEL_OUTOF10: 7
PAINLEVEL_OUTOF10: 8
PAINLEVEL_OUTOF10: 7
PAINLEVEL_OUTOF10: 7
PAINLEVEL_OUTOF10: 1
PAINLEVEL_OUTOF10: 8
PAINLEVEL_OUTOF10: 2

## 2024-09-29 ASSESSMENT — PAIN DESCRIPTION - DESCRIPTORS
DESCRIPTORS: ACHING;SHARP
DESCRIPTORS: ACHING;DISCOMFORT;SHARP

## 2024-09-29 ASSESSMENT — PAIN - FUNCTIONAL ASSESSMENT
PAIN_FUNCTIONAL_ASSESSMENT: PREVENTS OR INTERFERES SOME ACTIVE ACTIVITIES AND ADLS

## 2024-09-29 ASSESSMENT — PAIN DESCRIPTION - LOCATION
LOCATION: ABDOMEN

## 2024-09-29 ASSESSMENT — PAIN DESCRIPTION - ORIENTATION
ORIENTATION: LEFT
ORIENTATION: RIGHT
ORIENTATION: RIGHT
ORIENTATION: LEFT;RIGHT
ORIENTATION: MID;LOWER

## 2024-09-29 NOTE — PLAN OF CARE
Patient is A&Ox4 is able to ambulate independently without assistance. Patient received dilaudid 3x due to abdominal pain and ativan 2x. Patient received scheduled antibiotics. Patient did not want nicotine patch on, wanted to go outside to smoke. Form signed and explained to patient. Patient is a low fall risk, bed in lowest position, call device within reach.   Problem: Discharge Planning  Goal: Discharge to home or other facility with appropriate resources  Outcome: Progressing     Problem: Pain  Goal: Verbalizes/displays adequate comfort level or baseline comfort level  Outcome: Progressing  Patient having pain on their abdomen and rates it a 7. Pain interventions includerelaxation techniques, medication, manual techniques with parameters as follows:  , pillow support/positioning, diversional activities, and regular rest periods. Patients goal for pain relief is  0/10 . The need for pain and symptom management will be considered in the discharge planning process to ensure patients comfort.        Problem: ABCDS Injury Assessment  Goal: Absence of physical injury  Outcome: Progressing     Problem: Safety - Adult  Goal: Free from fall injury  Outcome: Progressing     Problem: Respiratory - Adult  Goal: Achieves optimal ventilation and oxygenation  Outcome: Progressing     Problem: Gastrointestinal - Adult  Goal: Minimal or absence of nausea and vomiting  Outcome: Progressing     Problem: Gastrointestinal - Adult  Goal: Maintains or returns to baseline bowel function  Outcome: Progressing     Problem: Gastrointestinal - Adult  Goal: Maintains adequate nutritional intake  Outcome: Progressing     Problem: Infection - Adult  Goal: Absence of infection at discharge  Outcome: Progressing

## 2024-09-29 NOTE — PLAN OF CARE
Patient A&Ox4, wears O2 when patient feels like he needs it  Patient complaining of abdominal pain, Dilaudid given Q3, see MAR.   Patient is on IV flagyl and cipro  Patient is a daily drinker, CIWA score completed as needed. Ativan given per CIWA score.   Safety maintained throughout shift, bed in lowest position, call light within reach, hourly rounding continued.     Patient having pain on their abdomen and rates it a 8. Pain interventions include medication. Patients goal for pain relief is 1. The need for pain and symptom management will be considered in the discharge planning process to ensure patients comfort.     Problem: Discharge Planning  Goal: Discharge to home or other facility with appropriate resources  9/29/2024 0117 by Ruba Szymanski, RN  Outcome: Progressing     Problem: Pain  Goal: Verbalizes/displays adequate comfort level or baseline comfort level  9/29/2024 0117 by Ruba Szymanski, RN  Outcome: Progressing     Problem: ABCDS Injury Assessment  Goal: Absence of physical injury  9/29/2024 0117 by Ruba Szymanski, RN  Outcome: Progressing     Problem: Safety - Adult  Goal: Free from fall injury  9/29/2024 0117 by Ruba Szymanski, RN  Outcome: Progressing

## 2024-09-29 NOTE — PROGRESS NOTES
Gastroenterology Progress Note      Patient:   Abdifatah RAI Dzagulones   :    1985   Facility:   [facility]   Date:     2024  Consultant:   NIESHA MORENO      Subjective:     Patient seen and examined.   No new issues, continues on IV antibiotics. Tmax 99.4, mildly tachycardic  Tolerating some food now.       Objective:   Vital Signs:  BP (!) 146/106   Pulse (!) 101   Temp 99.4 °F (37.4 °C) (Oral)   Resp 18   Wt 72.4 kg (159 lb 11.2 oz)   SpO2 96%   BMI 26.58 kg/m²      Physical Exam:   General appearance: Alert, NAD  Lungs: CTA bilaterally    Heart:S1S2 mild tachycardia  Abdomen: Soft, ttp llq, non distended, hypoactive bs  Skin:  No jaundice, no stigmata of chronic liver disease.    Lab and Imaging Review   CBC:   Lab Results   Component Value Date/Time    WBC 14.4 2024 04:44 AM    RBC 4.15 2024 04:44 AM    HGB 13.4 2024 04:44 AM    HCT 39.1 2024 04:44 AM    MCV 94.2 2024 04:44 AM    MCH 32.3 2024 04:44 AM    MCHC 34.3 2024 04:44 AM    RDW 12.6 2024 04:44 AM     2024 04:44 AM    MPV 10.3 2024 04:44 AM     WBC:    Lab Results   Component Value Date/Time    WBC 14.4 2024 04:44 AM     Platelets:    Lab Results   Component Value Date/Time     2024 04:44 AM     Hemoglobin/Hematocrit:    Lab Results   Component Value Date/Time    HGB 13.4 2024 04:44 AM    HCT 39.1 2024 04:44 AM     CMP:    Lab Results   Component Value Date/Time     2024 04:44 AM    K 3.6 2024 04:47 AM    CL 95 2024 04:44 AM    CO2 25 2024 04:44 AM    BUN 3 2024 04:44 AM    CREATININE 0.6 2024 04:44 AM    GFRAA >60 2020 05:49 AM    LABGLOM >90 2024 04:44 AM    GLUCOSE 112 2024 04:44 AM    CALCIUM 8.4 2024 04:44 AM    BILITOT 0.4 2024 04:44 AM    ALKPHOS 84 2024 04:44 AM    AST 13 2024 04:44 AM    ALT 9 2024 04:44 AM     BMP:   outpatient basis.   His is improving slowly.      Will expedite if clinically indicated.     This plan was formulated in collaboration with NIESHA Carrasco  3:40 PM  9/29/2024

## 2024-09-29 NOTE — PROGRESS NOTES
[facility]  Office: [phone]  Fady Flores DO, Chris Medina DO, Nick Yarbrough DO, Gary Wei DO, Aileen Lloyd MD, Marie Astorga MD, Jacob Kelly MD, Zaira Estrella MD,  German Ramos MD, Vi Castillo MD, Sigifredo Schwartz MD,  Denice Streeter DO, Sebastián Suggs MD, Tye Boateng MD, Stan Flores DO, Nida Wilhelm MD,  Damir Herrera DO, Mallory Brasher MD, Rosana Escalante MD, Conchis Jiménez MD, Salazar Trinh MD,  Santos Rai MD, Jonna Delgado MD, Raymundo Ambrocio MD, Silver Fisher MD, Angel Dhaliwal MD, Michael Lewis MD, Maxwell Sifuentes DO, Camron Salas DO, Dillon Brizuela DO, Delvin Leija MD, Shirley Waterhouse, CNP,  Kasie Early CNP, Maxwell Lugo, CNP,  Desiree Hannah, [facility], Stacie Moss, CNP, Rosangela Kennedy, CNP, Lauren Franco, CNP, Layla Fritz, CNP, Leandra Vega, PA-C, Ivory Cool PA-C, Taina Cunningham, CNP, Denys Pond, CNP,  Anusha Romero, CNP, Twila Montgomery, CNP, Gretchen Rudolph, CNP, Maria De Jesus Sebastian, CNS, Michelle Thapa, [facility], Dulce Sky, CNP, Tracy Schwab, [facility]   IN-PATIENT SERVICE      Progress Note    9/29/2024    10:46 AM    Name:   Abdifatah De Anda  MRN:     [id number]     Acct:      [id number]   Room:   1007/1007-02   Day:  2  Admit Date:  9/27/2024  6:37 PM    PCP:   Brennon Reyes DO  Code Status:  Full Code    Subjective:     C/C:   Chief Complaint   Patient presents with    Abdominal Pain     Pt reports being diagnosed w/ diverticulitis. Was discharged from [facility] today for same issue. Pt states that the pain has sice gotten worse.      Interval History Status: not changed.     Patient states that he was recently discharged from [facility] for diverticulitis.  He was not started on any antibiotics at that time.  He does admit to daily drinking of beer and alcohol.  His last drink was September 26    Brief History:     Abdifatah De Anda is a 39 y.o. Non- / non  male who presents with Abdominal Pain (Pt  famotidine  20 mg Oral BID    enoxaparin  40 mg SubCUTAneous Daily    nicotine  1 patch TransDERmal Daily    metroNIDAZOLE  500 mg IntraVENous Q8H     Continuous Infusions:    sodium chloride Stopped (24 0658)    sodium chloride 125 mL/hr at 24 0938     PRN Meds: LORazepam **OR** LORazepam **OR** LORazepam **OR** LORazepam **OR** LORazepam **OR** LORazepam **OR** LORazepam **OR** LORazepam, sodium chloride flush, sodium chloride, potassium chloride **OR** potassium alternative oral replacement **OR** potassium chloride, magnesium sulfate, ondansetron **OR** ondansetron, acetaminophen **OR** acetaminophen, polyethylene glycol, HYDROmorphone **OR** HYDROmorphone    Data:     Past Medical History:   has a past medical history of Alcohol abuse, Ankylosing spondylitis (HCC), Anxiety, Arthritis, rheumatoid (HCC), Benign essential hypertension, Chronic iritis of both eyes, Closed fracture of right distal fibula, Foraminal stenosis of lumbar region, Heartburn, Heroin abuse (HCC), Iritis, Sacroiliitis (HCC), and Spondylolisthesis, acquired.    Social History:   reports that he has been smoking cigarettes. He started smoking about 22 years ago. He has a 34.1 pack-year smoking history. He has never used smokeless tobacco. He reports current alcohol use. He reports current drug use. Drug: Marijuana (Weed).     Family History: History reviewed. No pertinent family history.    Vitals:  BP (!) 131/94   Pulse 92   Temp 98.2 °F (36.8 °C) (Oral)   Resp 18   Wt 72.4 kg (159 lb 11.2 oz)   SpO2 98%   BMI 26.58 kg/m²   Temp (24hrs), Av.5 °F (36.9 °C), Min:98.2 °F (36.8 °C), Max:98.8 °F (37.1 °C)    No results for input(s): \"POCGLU\" in the last 72 hours.    I/O (24Hr):    Intake/Output Summary (Last 24 hours) at 2024 1046  Last data filed at 2024 2119  Gross per 24 hour   Intake 175 ml   Output --   Net 175 ml       Labs:  Hematology:  Recent Labs     24  0444   WBC 13.2* 14.4*   RBC

## 2024-09-30 PROCEDURE — 6360000002 HC RX W HCPCS: Performed by: NURSE PRACTITIONER

## 2024-09-30 PROCEDURE — 99232 SBSQ HOSP IP/OBS MODERATE 35: CPT | Performed by: STUDENT IN AN ORGANIZED HEALTH CARE EDUCATION/TRAINING PROGRAM

## 2024-09-30 PROCEDURE — 2580000003 HC RX 258

## 2024-09-30 PROCEDURE — 2060000000 HC ICU INTERMEDIATE R&B

## 2024-09-30 PROCEDURE — 6370000000 HC RX 637 (ALT 250 FOR IP)

## 2024-09-30 PROCEDURE — 6360000002 HC RX W HCPCS

## 2024-09-30 RX ADMIN — HYDROMORPHONE HYDROCHLORIDE 1 MG: 1 INJECTION, SOLUTION INTRAMUSCULAR; INTRAVENOUS; SUBCUTANEOUS at 02:44

## 2024-09-30 RX ADMIN — CIPROFLOXACIN 200 MG: 2 INJECTION, SOLUTION INTRAVENOUS at 09:56

## 2024-09-30 RX ADMIN — METRONIDAZOLE 500 MG: 500 INJECTION, SOLUTION INTRAVENOUS at 14:06

## 2024-09-30 RX ADMIN — HYDROMORPHONE HYDROCHLORIDE 1 MG: 1 INJECTION, SOLUTION INTRAMUSCULAR; INTRAVENOUS; SUBCUTANEOUS at 05:59

## 2024-09-30 RX ADMIN — FAMOTIDINE 20 MG: 20 TABLET, FILM COATED ORAL at 21:23

## 2024-09-30 RX ADMIN — METRONIDAZOLE 500 MG: 500 INJECTION, SOLUTION INTRAVENOUS at 21:26

## 2024-09-30 RX ADMIN — CIPROFLOXACIN 200 MG: 2 INJECTION, SOLUTION INTRAVENOUS at 23:28

## 2024-09-30 RX ADMIN — LORAZEPAM 2 MG: 2 INJECTION INTRAMUSCULAR; INTRAVENOUS at 04:40

## 2024-09-30 RX ADMIN — CIPROFLOXACIN 200 MG: 2 INJECTION, SOLUTION INTRAVENOUS at 08:29

## 2024-09-30 RX ADMIN — LORAZEPAM 2 MG: 2 INJECTION INTRAMUSCULAR; INTRAVENOUS at 06:47

## 2024-09-30 RX ADMIN — LORAZEPAM 2 MG: 2 INJECTION INTRAMUSCULAR; INTRAVENOUS at 18:45

## 2024-09-30 RX ADMIN — LORAZEPAM 3 MG: 2 INJECTION INTRAMUSCULAR; INTRAVENOUS at 03:32

## 2024-09-30 RX ADMIN — THIAMINE HYDROCHLORIDE 100 MG: 100 INJECTION, SOLUTION INTRAMUSCULAR; INTRAVENOUS at 08:30

## 2024-09-30 RX ADMIN — SODIUM CHLORIDE, PRESERVATIVE FREE 10 ML: 5 INJECTION INTRAVENOUS at 21:30

## 2024-09-30 RX ADMIN — LORAZEPAM 3 MG: 2 INJECTION INTRAMUSCULAR; INTRAVENOUS at 00:40

## 2024-09-30 RX ADMIN — FAMOTIDINE 20 MG: 20 TABLET, FILM COATED ORAL at 08:30

## 2024-09-30 RX ADMIN — SODIUM CHLORIDE: 9 INJECTION, SOLUTION INTRAVENOUS at 00:00

## 2024-09-30 RX ADMIN — HYDROMORPHONE HYDROCHLORIDE 1 MG: 1 INJECTION, SOLUTION INTRAMUSCULAR; INTRAVENOUS; SUBCUTANEOUS at 08:59

## 2024-09-30 RX ADMIN — CIPROFLOXACIN 200 MG: 2 INJECTION, SOLUTION INTRAVENOUS at 22:28

## 2024-09-30 RX ADMIN — METRONIDAZOLE 500 MG: 500 INJECTION, SOLUTION INTRAVENOUS at 05:57

## 2024-09-30 RX ADMIN — HYDROMORPHONE HYDROCHLORIDE 1 MG: 1 INJECTION, SOLUTION INTRAMUSCULAR; INTRAVENOUS; SUBCUTANEOUS at 21:27

## 2024-09-30 RX ADMIN — ENOXAPARIN SODIUM 40 MG: 100 INJECTION SUBCUTANEOUS at 08:30

## 2024-09-30 RX ADMIN — ACETAMINOPHEN 650 MG: 325 TABLET ORAL at 15:19

## 2024-09-30 ASSESSMENT — PAIN - FUNCTIONAL ASSESSMENT
PAIN_FUNCTIONAL_ASSESSMENT: ACTIVITIES ARE NOT PREVENTED
PAIN_FUNCTIONAL_ASSESSMENT: PREVENTS OR INTERFERES SOME ACTIVE ACTIVITIES AND ADLS
PAIN_FUNCTIONAL_ASSESSMENT: PREVENTS OR INTERFERES SOME ACTIVE ACTIVITIES AND ADLS

## 2024-09-30 ASSESSMENT — PAIN DESCRIPTION - LOCATION
LOCATION: ABDOMEN;GROIN
LOCATION: ABDOMEN

## 2024-09-30 ASSESSMENT — PAIN DESCRIPTION - ORIENTATION
ORIENTATION: RIGHT;LEFT;LOWER
ORIENTATION: LEFT;RIGHT
ORIENTATION: RIGHT;LEFT;LOWER
ORIENTATION: RIGHT;LEFT;MID
ORIENTATION: LOWER;RIGHT;LEFT
ORIENTATION: LEFT;RIGHT
ORIENTATION: RIGHT;LEFT

## 2024-09-30 ASSESSMENT — PAIN DESCRIPTION - ONSET
ONSET: GRADUAL

## 2024-09-30 ASSESSMENT — PAIN SCALES - GENERAL
PAINLEVEL_OUTOF10: 2
PAINLEVEL_OUTOF10: 2
PAINLEVEL_OUTOF10: 8
PAINLEVEL_OUTOF10: 7
PAINLEVEL_OUTOF10: 9
PAINLEVEL_OUTOF10: 5
PAINLEVEL_OUTOF10: 5
PAINLEVEL_OUTOF10: 3
PAINLEVEL_OUTOF10: 5
PAINLEVEL_OUTOF10: 3
PAINLEVEL_OUTOF10: 3
PAINLEVEL_OUTOF10: 8

## 2024-09-30 ASSESSMENT — PAIN DESCRIPTION - PAIN TYPE
TYPE: ACUTE PAIN

## 2024-09-30 ASSESSMENT — PAIN DESCRIPTION - DESCRIPTORS
DESCRIPTORS: ACHING;DISCOMFORT
DESCRIPTORS: SHOOTING;SHARP
DESCRIPTORS: SHARP;SHOOTING
DESCRIPTORS: SHOOTING;SHARP
DESCRIPTORS: ACHING;BURNING
DESCRIPTORS: ACHING;DISCOMFORT
DESCRIPTORS: ACHING;BURNING

## 2024-09-30 ASSESSMENT — PAIN DESCRIPTION - FREQUENCY
FREQUENCY: CONTINUOUS

## 2024-09-30 NOTE — PROGRESS NOTES
Gastroenterology Progress Note      Patient:   Abdifatah RAI Dzagulones   :    1985   Facility:   [facility]   Date:     2024  Consultant:   NIESHA MORENO      Subjective:     Patient seen and examined.   Improving, but he is concerned as to not being sent home with PO pain medication, I discussed GI defers this to primary.   No new / acute issues.       Objective:   Vital Signs:  /86   Pulse 71   Temp 98.6 °F (37 °C) (Oral)   Resp 18   Wt 73.7 kg (162 lb 8 oz)   SpO2 94%   BMI 27.04 kg/m²      Physical Exam:   General appearance: Alert, NAD  Lungs: CTA bilaterally    Heart:S1S2 mild tachycardia  Abdomen: Soft, ttp llq, non distended, hypoactive bs  Skin:  No jaundice, no stigmata of chronic liver disease.    Lab and Imaging Review   CBC:   Lab Results   Component Value Date/Time    WBC 14.4 2024 04:44 AM    RBC 4.15 2024 04:44 AM    HGB 13.4 2024 04:44 AM    HCT 39.1 2024 04:44 AM    MCV 94.2 2024 04:44 AM    MCH 32.3 2024 04:44 AM    MCHC 34.3 2024 04:44 AM    RDW 12.6 2024 04:44 AM     2024 04:44 AM    MPV 10.3 2024 04:44 AM     WBC:    Lab Results   Component Value Date/Time    WBC 14.4 2024 04:44 AM     Platelets:    Lab Results   Component Value Date/Time     2024 04:44 AM     Hemoglobin/Hematocrit:    Lab Results   Component Value Date/Time    HGB 13.4 2024 04:44 AM    HCT 39.1 2024 04:44 AM     CMP:    Lab Results   Component Value Date/Time     2024 04:44 AM    K 3.6 2024 04:47 AM    CL 95 2024 04:44 AM    CO2 25 2024 04:44 AM    BUN 3 2024 04:44 AM    CREATININE 0.6 2024 04:44 AM    GFRAA >60 2020 05:49 AM    LABGLOM >90 2024 04:44 AM    GLUCOSE 112 2024 04:44 AM    CALCIUM 8.4 2024 04:44 AM    BILITOT 0.4 2024 04:44 AM    ALKPHOS 84 2024 04:44 AM    AST 13 2024 04:44 AM    ALT 9

## 2024-09-30 NOTE — PROGRESS NOTES
Comprehensive Nutrition Assessment    Type and Reason for Visit:  Initial, Positive Nutrition Screen    Nutrition Recommendations/Plan:   Continue current diet.  Will send Magic Cup daily to offer additional protein/calories  Monitor tolerance, labs, intakes, weight, POC     Malnutrition Assessment:  Malnutrition Status:  At risk for malnutrition (Comment) (09/30/24 1243)      Nutrition Assessment:    Patient is a 39 year old gentleman who presents with diverticulitis. He was recently at [facility], discharged on clears, came home and could not tolerate a regular diet. He screened for weight loss, currently 162#, UBW is around 155#, weight stable. Patient is tolerating low fiber diet at this time, with abdominal pain. Plan is for outpatient colonoscopy. History of alcohol use. Labs, meds, PMH reviewed.    Nutrition Related Findings:    Skin intact, no edema noted. LBM 9/28. Wound Type: None       Current Nutrition Intake & Therapies:    Average Meal Intake: 51-75%  Average Supplements Intake: None Ordered  ADULT DIET; Regular; Low Fiber    Anthropometric Measures:  Height: 165.1 cm (5' 5\")  Ideal Body Weight (IBW): 136 lbs (62 kg)       Current Body Weight: 73.5 kg (162 lb), 119.1 % IBW. Weight Source: Standing Scale  Current BMI (kg/m2): 27                          BMI Categories: Overweight (BMI 25.0-29.9)    Estimated Daily Nutrient Needs:  Energy Requirements Based On: Kcal/kg  Weight Used for Energy Requirements: Current  Energy (kcal/day): 0313-6052 (20-25)  Weight Used for Protein Requirements: Current  Protein (g/day):  (1-1.5)  Method Used for Fluid Requirements: 1 ml/kcal  Fluid (ml/day): 5458-9707 (20-25)    Nutrition Diagnosis:   Altered GI function related to altered GI function as evidenced by poor intake prior to admission    Nutrition Interventions:   Food and/or Nutrient Delivery: Continue Current Diet, Start Oral Nutrition Supplement  Nutrition Education/Counseling: Education not  indicated  Coordination of Nutrition Care: Continue to monitor while inpatient       Goals:     Goals: PO intake 50% or greater       Nutrition Monitoring and Evaluation:      Food/Nutrient Intake Outcomes: Food and Nutrient Intake, Supplement Intake  Physical Signs/Symptoms Outcomes: Biochemical Data, Skin, Weight, Nausea or Vomiting, GI Status    Discharge Planning:    Too soon to determine     Barbara Grayson RD  Contact: [phone]

## 2024-09-30 NOTE — PROGRESS NOTES
[facility]  Office: [phone]  Fady Flores DO, Chris Medina DO, Nick Yarbrough DO, Gary Wei, DO, Aileen Lloyd MD, Marie Astorga MD, Jacob Kelly MD, Zaira Estrella MD,  German Ramos MD, Vi Castillo MD, Sigifredo Schwartz MD,  Denice Streeter DO, Sebastián Suggs MD, Tye Boateng MD, Stan Flores DO, Nida Wilhelm MD,  Damir Herrera DO, Mallory Brasher MD, Rosana Escalante MD, Conchis Jiménez MD, Salazar Trinh MD,  Santos Rai MD, Jonna Delgado MD, Raymundo Ambrocio MD, Silver Fisher MD, Angel Dhaliwal MD, Michael Lewis MD, Maxwell Sifuentes DO, Camron Salas DO, Dillon Brizuela DO, Delvin Leija MD, Shirley Waterhouse, CNP,  Kasie Early CNP, Maxwell Lugo, CNP,  Desiree Hannah, [facility], Stacie Moss, CNP, Rosangela Kennedy, CNP, Lauren Franco, CNP, Layla Fritz, CNP, Leandra Vega, PA-C, Ivory Cool, PA-C, Taina Cunningham, CNP, Denys Pond, CNP,  Anusha Romero, CNP, Twila Montgomery, CNP, Gretchen Rudolph, CNP, Maria De Jesus Sebastian, CNS, Michelle Thapa, [facility], Dulce Sky, CNP, Tracy Schwab, [facility]   IN-PATIENT SERVICE      Progress Note    9/30/2024    1:02 PM    Name:   Abdifatah De Anda  MRN:     [id number]     Acct:      [id number]   Room:   [facility]/1007-02   Day:  3  Admit Date:  9/27/2024  6:37 PM    PCP:   Brennon Reyes DO  Code Status:  Full Code    Subjective:     C/C:   Chief Complaint   Patient presents with    Abdominal Pain     Pt reports being diagnosed w/ diverticulitis. Was discharged from [facility] today for same issue. Pt states that the pain has sice gotten worse.      Interval History Status: not changed.     Patient states that he was recently discharged from [facility] for diverticulitis.  He was not started on any antibiotics at that time.  He does admit to daily drinking of beer and alcohol.  His last drink was September 26    Pt requesting to stay one more day  OK for discharge 10/1 on po abx and f.u with GI for colonoscopy     Brief History:  ml   Output --   Net 7165.45 ml       Labs:  Hematology:  Recent Labs     09/27/24 1929 09/28/24 0444   WBC 13.2* 14.4*   RBC 4.55 4.15*   HGB 14.8 13.4   HCT 41.7 39.1*   MCV 91.6 94.2   MCH 32.5 32.3   MCHC 35.5* 34.3   RDW 12.4 12.6    213   MPV 9.9 10.3   SEDRATE  --  36*     Chemistry:  Recent Labs     09/27/24 1929 09/28/24 0444 09/29/24 0447   * 133*  --    K 3.9 3.5* 3.6*   CL 91* 95*  --    CO2 21 25  --    GLUCOSE 97 112*  --    BUN 4* 3*  --    CREATININE 0.5* 0.6*  --    MG  --  1.6  --    ANIONGAP 20* 13  --    LABGLOM >90 >90  --    CALCIUM 8.9 8.4*  --    PHOS  --  2.6  --      Recent Labs     09/27/24 1929 09/28/24 0444   AST 12 13   ALT 10 9   ALKPHOS 99 84   BILITOT 0.4 0.4   LIPASE 16 16     ABG:No results found for: \"POCPH\", \"PHART\", \"PH\", \"POCPCO2\", \"VIJ2DZH\", \"PCO2\", \"POCPO2\", \"PO2ART\", \"PO2\", \"POCHCO3\", \"FBX7MOQ\", \"HCO3\", \"NBEA\", \"PBEA\", \"BEART\", \"BE\", \"THGBART\", \"THB\", \"KOX8SAF\", \"VPAR5GFK\", \"E9HRXZBC\", \"O2SAT\", \"FIO2\"  Lab Results   Component Value Date/Time    SPECIAL NOT REPORTED 09/02/2020 09:45 AM     Lab Results   Component Value Date/Time    CULTURE NO GROWTH 2 DAYS 09/27/2024 11:26 PM    CULTURE NO GROWTH 2 DAYS 09/27/2024 11:26 PM       Radiology:  CT ABDOMEN PELVIS W IV CONTRAST Additional Contrast? None    Result Date: 9/27/2024  1. Significant inflammatory stranding posterior to the mid sigmoid colon mildly worsened from the previous study with questionable focal bowel wall thickening.  Small volume of reactive ascites.  Findings are compatible with acute diverticulitis. No extraluminal gas or drainable fluid collection identified. 2. Circumferential wall thickening of a loop of jejunum within the left lower quadrant adjacent to the sigmoid colon new from the previous exam. This is likely reactive to the adjacent sigmoid colon inflammation.       Physical Examination:        General appearance:  alert, cooperative and no distress  Mental Status:  oriented to

## 2024-09-30 NOTE — CARE COORDINATION
Discharge planning    SS gave etoh cessation resources and writer gave local area mental health resources.

## 2024-09-30 NOTE — PLAN OF CARE
Problem: Discharge Planning  Goal: Discharge to home or other facility with appropriate resources  9/30/2024 1949 by James Irene, RN  Outcome: Progressing  Note: Discharge teaching and instructions for diagnosis/procedure explained with patient using teachback method.  Patient voiced understanding regarding prescriptions, follow up appointments, and care of self at home.   9/30/2024 1710 by Braden Pan RN  Outcome: Progressing     Problem: Pain  Goal: Verbalizes/displays adequate comfort level or baseline comfort level  9/30/2024 1949 by James Irene RN  Outcome: Progressing  Note: Patient having pain on their abdomen and rates it a 3. Pain interventions includerelaxation techniques. Patients goal for pain relief is 2. The need for pain and symptom management will be considered in the discharge planning process to ensure patients comfort.   9/30/2024 1710 by Braden Pan RN  Outcome: Progressing     Problem: ABCDS Injury Assessment  Goal: Absence of physical injury  9/30/2024 1949 by James Irene RN  Outcome: Progressing  Note: Non-skid socks in place, up with assistance, bed in lowest position, bed exit & alarm as needed, provide toileting every 2 hours an d as needed.  9/30/2024 1710 by Braden Pan RN  Outcome: Progressing     Problem: Safety - Adult  Goal: Free from fall injury  9/30/2024 1949 by James Irene, RN  Outcome: Progressing  Note: Pt fall risk, fall band present, falling star, safety alarm activated and in use as needed. Hourly rounding performed. Pt encouraged to use call light. See Yeni fall risk assessment.  9/30/2024 1710 by Braden Pan RN  Outcome: Progressing     Problem: Respiratory - Adult  Goal: Achieves optimal ventilation and oxygenation  9/30/2024 1949 by James Irene, RN  Outcome: Progressing  Note: Assess breath sounds every shift and as needed.  Assess oxygenation level & respiration rate.  Encourage coughing & deep breathing.   Encourage use of incentive spirometer.  Assess cough & sputum.  Administer oxygen as needed.  9/30/2024 1710 by Braden Pan RN  Outcome: Progressing     Problem: Gastrointestinal - Adult  Goal: Minimal or absence of nausea and vomiting  9/30/2024 1949 by James Irene RN  Outcome: Progressing  Note: Assessed for nausea & vomiting.  Assessed ability to tolerate food and/or fluids, & nutritional intake.  Medicated as needed for nausea & vomiting.  9/30/2024 1710 by Braden Pan RN  Outcome: Progressing  Goal: Maintains or returns to baseline bowel function  9/30/2024 1949 by James Irene RN  Outcome: Progressing  9/30/2024 1710 by Braden Pan RN  Outcome: Progressing  Goal: Maintains adequate nutritional intake  9/30/2024 1949 by James Irene RN  Outcome: Progressing  Note: Review diet daily and as needed with pt.  Provide supplement nutrition as ordered by physician & educate pt.  Review nutritional guidelines with pt.  9/30/2024 1710 by Braden Pan RN  Outcome: Progressing     Problem: Infection - Adult  Goal: Absence of infection at discharge  9/30/2024 1949 by James Irene RN  Outcome: Progressing  Note: Monitor for signs & symptoms of infection.  Utilize standard precautions & proper handwashing.  Communicate referral to infection control.    9/30/2024 1710 by Braden Pan RN  Outcome: Progressing     Problem: Nutrition Deficit:  Goal: Optimize nutritional status  9/30/2024 1949 by James Irene RN  Outcome: Progressing  Note: Review diet daily and as needed with pt.  Provide supplement nutrition as ordered by physician & educate pt.  Review nutritional guidelines with pt.  9/30/2024 1710 by Braden Pan RN  Outcome: Progressing

## 2024-09-30 NOTE — PLAN OF CARE
Patient is A&Ox4, wears O2 when patient feels like he needs it and ambulates independently without assistance.   Patient complaining of abdominal pain, Dilaudid given Q3, see MAR for administration.   Patient is on IV flagyl and cipro for diverticulitis  Patient has been eating solid foods, and has been tolerating well.   Patient is a daily drinker, CIWA score completed as needed. Ativan given per CIWA score. See MAR for administration.   Patient went outside to smoke at beginning of shift. Form signed and explained to patient.   Plan is for patient to have colonoscopy outpatient  Safety maintained throughout shift, bed in lowest position, call light within reach, hourly rounding continued.     Patient having pain on their abdomen and rates it a 9. Pain interventions include medication and regular rest periods. Patients goal for pain relief is 1. The need for pain and symptom management will be considered in the discharge planning process to ensure patients comfort.     Problem: Discharge Planning  Goal: Discharge to home or other facility with appropriate resources  9/30/2024 0052 by Ruba Szymanski RN  Outcome: Progressing     Problem: Pain  Goal: Verbalizes/displays adequate comfort level or baseline comfort level  9/30/2024 0052 by Ruba Szymanski RN  Outcome: Progressing     Problem: ABCDS Injury Assessment  Goal: Absence of physical injury  9/30/2024 0052 by Ruba Szymanski RN  Outcome: Progressing     Problem: Safety - Adult  Goal: Free from fall injury  9/30/2024 0052 by Ruba Szymanski RN  Outcome: Progressing     Problem: Respiratory - Adult  Goal: Achieves optimal ventilation and oxygenation  9/30/2024 0052 by Ruba Syzmanski RN  Outcome: Progressing     Problem: Gastrointestinal - Adult  Goal: Minimal or absence of nausea and vomiting  9/30/2024 0052 by Ruba Szymanski RN  Outcome: Progressing     Problem: Gastrointestinal - Adult  Goal: Maintains or returns to baseline bowel function  9/30/2024

## 2024-09-30 NOTE — PLAN OF CARE
Pt alert and oriented X4.   Pt ambulates independently without assistance.   Pt is on IV flagyl and cipro for diverticulitis.   Patient tolerating solid food.  Patient daily drinker, CIWA scale in place, 2mg of ativan given this evening.   IV Dilaudid and P.O Tylenol.   Patient having pain on their abdomen and rates it a 5. Pain interventions includecorrect body alignment/body mechanics, relaxation techniques, and medication. Patients goal for pain relief is 3. The need for pain and symptom management will be considered in the discharge planning process to ensure patients comfort.   Patient voiced concerns about depression and anxiety, psychiatric provider consulted.   Patient went outside to smoke during shift, form signed and explained to patient.   Colonoscopy scheduled outpatient.   Safety measures in place, call light within reach, all concerns addressed at this time.   Problem: Discharge Planning  Goal: Discharge to home or other facility with appropriate resources  Outcome: Progressing     Problem: Pain  Goal: Verbalizes/displays adequate comfort level or baseline comfort level  Outcome: Progressing     Problem: ABCDS Injury Assessment  Goal: Absence of physical injury  Outcome: Progressing     Problem: Safety - Adult  Goal: Free from fall injury  Outcome: Progressing     Problem: Respiratory - Adult  Goal: Achieves optimal ventilation and oxygenation  Outcome: Progressing     Problem: Gastrointestinal - Adult  Goal: Minimal or absence of nausea and vomiting  Outcome: Progressing     Problem: Gastrointestinal - Adult  Goal: Maintains or returns to baseline bowel function  Outcome: Progressing     Problem: Gastrointestinal - Adult  Goal: Maintains adequate nutritional intake  Outcome: Progressing     Problem: Infection - Adult  Goal: Absence of infection at discharge  Outcome: Progressing     Problem: Nutrition Deficit:  Goal: Optimize nutritional status  Outcome: Progressing

## 2024-10-01 ENCOUNTER — APPOINTMENT (OUTPATIENT)
Dept: CT IMAGING | Age: 39
End: 2024-10-01
Payer: COMMERCIAL

## 2024-10-01 ENCOUNTER — HOSPITAL ENCOUNTER (EMERGENCY)
Age: 39
Discharge: HOME | End: 2024-10-01
Attending: EMERGENCY MEDICINE
Payer: COMMERCIAL

## 2024-10-01 ENCOUNTER — HOSPITAL ENCOUNTER (INPATIENT)
Age: 39
LOS: 9 days | Discharge: HOME | End: 2024-10-10
Attending: EMERGENCY MEDICINE | Admitting: INTERNAL MEDICINE
Payer: COMMERCIAL

## 2024-10-01 VITALS
RESPIRATION RATE: 16 BRPM | SYSTOLIC BLOOD PRESSURE: 167 MMHG | HEART RATE: 65 BPM | TEMPERATURE: 99 F | DIASTOLIC BLOOD PRESSURE: 91 MMHG | BODY MASS INDEX: 27.16 KG/M2 | OXYGEN SATURATION: 97 % | WEIGHT: 163 LBS | HEIGHT: 65 IN

## 2024-10-01 VITALS
WEIGHT: 163 LBS | RESPIRATION RATE: 30 BRPM | HEART RATE: 93 BPM | OXYGEN SATURATION: 91 % | DIASTOLIC BLOOD PRESSURE: 96 MMHG | BODY MASS INDEX: 27.16 KG/M2 | SYSTOLIC BLOOD PRESSURE: 149 MMHG | TEMPERATURE: 98.2 F | HEIGHT: 65 IN

## 2024-10-01 DIAGNOSIS — Z98.890 S/P EXPLORATORY LAPAROTOMY: ICD-10-CM

## 2024-10-01 DIAGNOSIS — K66.8 PERITONEAL FREE AIR: ICD-10-CM

## 2024-10-01 DIAGNOSIS — K57.32 DIVERTICULITIS OF COLON: ICD-10-CM

## 2024-10-01 DIAGNOSIS — R10.84 GENERALIZED ABDOMINAL PAIN: Primary | ICD-10-CM

## 2024-10-01 DIAGNOSIS — A41.9 SEPTICEMIA (HCC): ICD-10-CM

## 2024-10-01 DIAGNOSIS — K65.1 INTRA-ABDOMINAL ABSCESS (HCC): Primary | ICD-10-CM

## 2024-10-01 DIAGNOSIS — K57.20 PERFORATION OF SIGMOID COLON DUE TO DIVERTICULITIS: ICD-10-CM

## 2024-10-01 LAB
ALBUMIN SERPL-MCNC: 3 G/DL (ref 3.5–5.2)
ALBUMIN SERPL-MCNC: 3.1 G/DL (ref 3.5–5.2)
ALP SERPL-CCNC: 69 U/L (ref 40–129)
ALP SERPL-CCNC: 73 U/L (ref 40–129)
ALT SERPL-CCNC: 6 U/L (ref 5–41)
ALT SERPL-CCNC: 8 U/L (ref 5–41)
ANA SER QL IA: NEGATIVE
ANA SER QL IA: NEGATIVE
ANION GAP SERPL CALCULATED.3IONS-SCNC: 13 MMOL/L (ref 9–17)
ANION GAP SERPL CALCULATED.3IONS-SCNC: 14 MMOL/L (ref 9–17)
AST SERPL-CCNC: 10 U/L
AST SERPL-CCNC: 11 U/L
BASOPHILS # BLD: 0.04 K/UL (ref 0–0.2)
BASOPHILS NFR BLD: 1 % (ref 0–2)
BILIRUB DIRECT SERPL-MCNC: 0.1 MG/DL
BILIRUB INDIRECT SERPL-MCNC: 0.1 MG/DL (ref 0–1)
BILIRUB SERPL-MCNC: 0.2 MG/DL (ref 0.3–1.2)
BILIRUB SERPL-MCNC: 0.3 MG/DL (ref 0.3–1.2)
BILIRUB UR QL STRIP: NEGATIVE
BUN SERPL-MCNC: 2 MG/DL (ref 6–20)
BUN SERPL-MCNC: <2 MG/DL (ref 6–20)
BUN/CREAT SERPL: 4 (ref 9–20)
BUN/CREAT SERPL: ABNORMAL (ref 9–20)
CALCIUM SERPL-MCNC: 8.3 MG/DL (ref 8.6–10.4)
CALCIUM SERPL-MCNC: 8.4 MG/DL (ref 8.6–10.4)
CHLORIDE SERPL-SCNC: 101 MMOL/L (ref 98–107)
CHLORIDE SERPL-SCNC: 101 MMOL/L (ref 98–107)
CLARITY UR: CLEAR
CO2 SERPL-SCNC: 23 MMOL/L (ref 20–31)
CO2 SERPL-SCNC: 24 MMOL/L (ref 20–31)
COLOR UR: YELLOW
COMMENT: ABNORMAL
CREAT SERPL-MCNC: 0.4 MG/DL (ref 0.7–1.2)
CREAT SERPL-MCNC: 0.5 MG/DL (ref 0.7–1.2)
DSDNA IGG SER QL IA: <0.5 IU/ML
DSDNA IGG SER QL IA: <0.5 IU/ML
EOSINOPHIL # BLD: 0.14 K/UL (ref 0–0.44)
EOSINOPHILS RELATIVE PERCENT: 2 % (ref 1–4)
ERYTHROCYTE [DISTWIDTH] IN BLOOD BY AUTOMATED COUNT: 12.7 % (ref 11.8–14.4)
GFR, ESTIMATED: >90 ML/MIN/1.73M2
GFR, ESTIMATED: >90 ML/MIN/1.73M2
GLUCOSE SERPL-MCNC: 119 MG/DL (ref 70–99)
GLUCOSE SERPL-MCNC: 94 MG/DL (ref 70–99)
GLUCOSE UR STRIP-MCNC: NEGATIVE MG/DL
HCT VFR BLD AUTO: 35 % (ref 40.7–50.3)
HGB BLD-MCNC: 12 G/DL (ref 13–17)
HGB UR QL STRIP.AUTO: NEGATIVE
IMM GRANULOCYTES # BLD AUTO: 0.08 K/UL (ref 0–0.3)
IMM GRANULOCYTES NFR BLD: 1 %
KETONES UR STRIP-MCNC: ABNORMAL MG/DL
LACTATE BLDV-SCNC: 2.5 MMOL/L (ref 0.5–1.9)
LEUKOCYTE ESTERASE UR QL STRIP: NEGATIVE
LIPASE SERPL-CCNC: 13 U/L (ref 13–60)
LYMPHOCYTES NFR BLD: 1.19 K/UL (ref 1.1–3.7)
LYMPHOCYTES RELATIVE PERCENT: 15 % (ref 24–43)
MCH RBC QN AUTO: 32 PG (ref 25.2–33.5)
MCHC RBC AUTO-ENTMCNC: 34.3 G/DL (ref 28.4–34.8)
MCV RBC AUTO: 93.3 FL (ref 82.6–102.9)
MONOCYTES NFR BLD: 0.98 K/UL (ref 0.1–1.2)
MONOCYTES NFR BLD: 12 % (ref 3–12)
NEUTROPHILS NFR BLD: 70 % (ref 36–65)
NEUTS SEG NFR BLD: 5.63 K/UL (ref 1.5–8.1)
NITRITE UR QL STRIP: NEGATIVE
NRBC BLD-RTO: 0 PER 100 WBC
NUCLEAR IGG SER IA-RTO: <0.1 U/ML
NUCLEAR IGG SER IA-RTO: <0.1 U/ML
PH UR STRIP: 6.5 [PH] (ref 5–8)
PLATELET # BLD AUTO: 230 K/UL (ref 138–453)
PMV BLD AUTO: 10.3 FL (ref 8.1–13.5)
POTASSIUM SERPL-SCNC: 3.2 MMOL/L (ref 3.7–5.3)
POTASSIUM SERPL-SCNC: 3.2 MMOL/L (ref 3.7–5.3)
PROT SERPL-MCNC: 5.5 G/DL (ref 6.4–8.3)
PROT SERPL-MCNC: 5.8 G/DL (ref 6.4–8.3)
PROT UR STRIP-MCNC: NEGATIVE MG/DL
RBC # BLD AUTO: 3.75 M/UL (ref 4.21–5.77)
SODIUM SERPL-SCNC: 138 MMOL/L (ref 135–144)
SODIUM SERPL-SCNC: 138 MMOL/L (ref 135–144)
SP GR UR STRIP: 1.06 (ref 1–1.03)
UROBILINOGEN UR STRIP-ACNC: NORMAL EU/DL (ref 0–1)
WBC OTHER # BLD: 8.1 K/UL (ref 3.5–11.3)

## 2024-10-01 PROCEDURE — 99284 EMERGENCY DEPT VISIT MOD MDM: CPT

## 2024-10-01 PROCEDURE — 6360000002 HC RX W HCPCS: Performed by: NURSE PRACTITIONER

## 2024-10-01 PROCEDURE — 80076 HEPATIC FUNCTION PANEL: CPT

## 2024-10-01 PROCEDURE — 2580000003 HC RX 258

## 2024-10-01 PROCEDURE — 6370000000 HC RX 637 (ALT 250 FOR IP)

## 2024-10-01 PROCEDURE — 83605 ASSAY OF LACTIC ACID: CPT

## 2024-10-01 PROCEDURE — 96374 THER/PROPH/DIAG INJ IV PUSH: CPT

## 2024-10-01 PROCEDURE — 85025 COMPLETE CBC W/AUTO DIFF WBC: CPT

## 2024-10-01 PROCEDURE — 99238 HOSP IP/OBS DSCHRG MGMT 30/<: CPT | Performed by: FAMILY MEDICINE

## 2024-10-01 PROCEDURE — 80048 BASIC METABOLIC PNL TOTAL CA: CPT

## 2024-10-01 PROCEDURE — 6360000004 HC RX CONTRAST MEDICATION: Performed by: EMERGENCY MEDICINE

## 2024-10-01 PROCEDURE — 6360000002 HC RX W HCPCS: Performed by: EMERGENCY MEDICINE

## 2024-10-01 PROCEDURE — 2580000003 HC RX 258: Performed by: EMERGENCY MEDICINE

## 2024-10-01 PROCEDURE — 96375 TX/PRO/DX INJ NEW DRUG ADDON: CPT

## 2024-10-01 PROCEDURE — 2060000000 HC ICU INTERMEDIATE R&B

## 2024-10-01 PROCEDURE — 80053 COMPREHEN METABOLIC PANEL: CPT

## 2024-10-01 PROCEDURE — 81003 URINALYSIS AUTO W/O SCOPE: CPT

## 2024-10-01 PROCEDURE — 87040 BLOOD CULTURE FOR BACTERIA: CPT

## 2024-10-01 PROCEDURE — 99222 1ST HOSP IP/OBS MODERATE 55: CPT

## 2024-10-01 PROCEDURE — 83690 ASSAY OF LIPASE: CPT

## 2024-10-01 PROCEDURE — 6360000002 HC RX W HCPCS

## 2024-10-01 PROCEDURE — 36415 COLL VENOUS BLD VENIPUNCTURE: CPT

## 2024-10-01 PROCEDURE — 74177 CT ABD & PELVIS W/CONTRAST: CPT

## 2024-10-01 PROCEDURE — 99285 EMERGENCY DEPT VISIT HI MDM: CPT

## 2024-10-01 RX ORDER — THIAMINE HYDROCHLORIDE 100 MG/ML
100 INJECTION, SOLUTION INTRAMUSCULAR; INTRAVENOUS DAILY
Qty: 1 EACH | Refills: 0 | Status: ON HOLD | OUTPATIENT
Start: 2024-10-02 | End: 2024-10-09 | Stop reason: HOSPADM

## 2024-10-01 RX ORDER — MORPHINE SULFATE 2 MG/ML
2 INJECTION, SOLUTION INTRAMUSCULAR; INTRAVENOUS ONCE
Status: COMPLETED | OUTPATIENT
Start: 2024-10-01 | End: 2024-10-01

## 2024-10-01 RX ORDER — ONDANSETRON 2 MG/ML
4 INJECTION INTRAMUSCULAR; INTRAVENOUS ONCE
Status: COMPLETED | OUTPATIENT
Start: 2024-10-01 | End: 2024-10-01

## 2024-10-01 RX ORDER — CIPROFLOXACIN 2 MG/ML
200 INJECTION, SOLUTION INTRAVENOUS ONCE
Status: COMPLETED | OUTPATIENT
Start: 2024-10-01 | End: 2024-10-02

## 2024-10-01 RX ORDER — CIPROFLOXACIN 500 MG/1
500 TABLET, FILM COATED ORAL 2 TIMES DAILY
Qty: 24 TABLET | Refills: 0 | Status: ON HOLD | OUTPATIENT
Start: 2024-10-01 | End: 2024-10-09 | Stop reason: HOSPADM

## 2024-10-01 RX ORDER — CIPROFLOXACIN 2 MG/ML
200 INJECTION, SOLUTION INTRAVENOUS ONCE
Status: COMPLETED | OUTPATIENT
Start: 2024-10-02 | End: 2024-10-02

## 2024-10-01 RX ORDER — METRONIDAZOLE 500 MG/1
500 TABLET ORAL 3 TIMES DAILY
Qty: 36 TABLET | Refills: 0 | Status: ON HOLD | OUTPATIENT
Start: 2024-10-01 | End: 2024-10-09 | Stop reason: HOSPADM

## 2024-10-01 RX ORDER — METRONIDAZOLE 500 MG/100ML
500 INJECTION, SOLUTION INTRAVENOUS ONCE
Status: COMPLETED | OUTPATIENT
Start: 2024-10-01 | End: 2024-10-02

## 2024-10-01 RX ORDER — 0.9 % SODIUM CHLORIDE 0.9 %
17 INTRAVENOUS SOLUTION INTRAVENOUS ONCE
Status: COMPLETED | OUTPATIENT
Start: 2024-10-01 | End: 2024-10-02

## 2024-10-01 RX ORDER — CIPROFLOXACIN 2 MG/ML
400 INJECTION, SOLUTION INTRAVENOUS ONCE
Status: DISCONTINUED | OUTPATIENT
Start: 2024-10-01 | End: 2024-10-01 | Stop reason: SDUPTHER

## 2024-10-01 RX ORDER — KETOROLAC TROMETHAMINE 30 MG/ML
30 INJECTION, SOLUTION INTRAMUSCULAR; INTRAVENOUS ONCE
Status: COMPLETED | OUTPATIENT
Start: 2024-10-01 | End: 2024-10-01

## 2024-10-01 RX ORDER — 0.9 % SODIUM CHLORIDE 0.9 %
1000 INTRAVENOUS SOLUTION INTRAVENOUS ONCE
Status: COMPLETED | OUTPATIENT
Start: 2024-10-01 | End: 2024-10-01

## 2024-10-01 RX ORDER — SODIUM CHLORIDE 0.9 % (FLUSH) 0.9 %
10 SYRINGE (ML) INJECTION PRN
Status: DISCONTINUED | OUTPATIENT
Start: 2024-10-01 | End: 2024-10-10 | Stop reason: HOSPADM

## 2024-10-01 RX ORDER — IOPAMIDOL 755 MG/ML
75 INJECTION, SOLUTION INTRAVASCULAR
Status: COMPLETED | OUTPATIENT
Start: 2024-10-01 | End: 2024-10-01

## 2024-10-01 RX ORDER — IBUPROFEN 600 MG/1
600 TABLET, FILM COATED ORAL 3 TIMES DAILY PRN
Qty: 30 TABLET | Refills: 0 | Status: SHIPPED | OUTPATIENT
Start: 2024-10-01

## 2024-10-01 RX ORDER — 0.9 % SODIUM CHLORIDE 0.9 %
1000 INTRAVENOUS SOLUTION INTRAVENOUS ONCE
Status: COMPLETED | OUTPATIENT
Start: 2024-10-01 | End: 2024-10-02

## 2024-10-01 RX ORDER — 0.9 % SODIUM CHLORIDE 0.9 %
80 INTRAVENOUS SOLUTION INTRAVENOUS ONCE
Status: COMPLETED | OUTPATIENT
Start: 2024-10-01 | End: 2024-10-01

## 2024-10-01 RX ADMIN — ENOXAPARIN SODIUM 40 MG: 100 INJECTION SUBCUTANEOUS at 09:18

## 2024-10-01 RX ADMIN — ONDANSETRON 4 MG: 2 INJECTION, SOLUTION INTRAMUSCULAR; INTRAVENOUS at 21:59

## 2024-10-01 RX ADMIN — HYDROMORPHONE HYDROCHLORIDE 1 MG: 1 INJECTION, SOLUTION INTRAMUSCULAR; INTRAVENOUS; SUBCUTANEOUS at 00:40

## 2024-10-01 RX ADMIN — METRONIDAZOLE 500 MG: 500 INJECTION, SOLUTION INTRAVENOUS at 05:04

## 2024-10-01 RX ADMIN — SODIUM CHLORIDE 1000 ML: 9 INJECTION, SOLUTION INTRAVENOUS at 21:57

## 2024-10-01 RX ADMIN — LORAZEPAM 2 MG: 2 INJECTION INTRAMUSCULAR; INTRAVENOUS at 03:16

## 2024-10-01 RX ADMIN — SODIUM CHLORIDE: 9 INJECTION, SOLUTION INTRAVENOUS at 02:22

## 2024-10-01 RX ADMIN — SODIUM CHLORIDE 1000 ML: 9 INJECTION, SOLUTION INTRAVENOUS at 14:16

## 2024-10-01 RX ADMIN — HYDROMORPHONE HYDROCHLORIDE 0.5 MG: 1 INJECTION, SOLUTION INTRAMUSCULAR; INTRAVENOUS; SUBCUTANEOUS at 09:17

## 2024-10-01 RX ADMIN — MORPHINE SULFATE 2 MG: 2 INJECTION, SOLUTION INTRAMUSCULAR; INTRAVENOUS at 14:42

## 2024-10-01 RX ADMIN — FAMOTIDINE 20 MG: 20 TABLET, FILM COATED ORAL at 09:18

## 2024-10-01 RX ADMIN — SODIUM CHLORIDE, PRESERVATIVE FREE 10 ML: 5 INJECTION INTRAVENOUS at 22:10

## 2024-10-01 RX ADMIN — POTASSIUM CHLORIDE 40 MEQ: 1500 TABLET, EXTENDED RELEASE ORAL at 07:16

## 2024-10-01 RX ADMIN — CIPROFLOXACIN 200 MG: 2 INJECTION, SOLUTION INTRAVENOUS at 09:22

## 2024-10-01 RX ADMIN — THIAMINE HYDROCHLORIDE 100 MG: 100 INJECTION, SOLUTION INTRAMUSCULAR; INTRAVENOUS at 09:18

## 2024-10-01 RX ADMIN — SODIUM CHLORIDE 80 ML: 0.9 INJECTION, SOLUTION INTRAVENOUS at 22:10

## 2024-10-01 RX ADMIN — IOPAMIDOL 75 ML: 755 INJECTION, SOLUTION INTRAVENOUS at 22:10

## 2024-10-01 RX ADMIN — KETOROLAC TROMETHAMINE 30 MG: 30 INJECTION, SOLUTION INTRAMUSCULAR at 21:59

## 2024-10-01 RX ADMIN — HYDROMORPHONE HYDROCHLORIDE 1 MG: 1 INJECTION, SOLUTION INTRAMUSCULAR; INTRAVENOUS; SUBCUTANEOUS at 05:00

## 2024-10-01 ASSESSMENT — PAIN DESCRIPTION - FREQUENCY
FREQUENCY: CONTINUOUS

## 2024-10-01 ASSESSMENT — PAIN DESCRIPTION - PAIN TYPE
TYPE: ACUTE PAIN

## 2024-10-01 ASSESSMENT — PAIN DESCRIPTION - ONSET
ONSET: GRADUAL

## 2024-10-01 ASSESSMENT — ENCOUNTER SYMPTOMS
PHOTOPHOBIA: 0
VOICE CHANGE: 0
SHORTNESS OF BREATH: 0
WHEEZING: 0
ABDOMINAL PAIN: 1
COLOR CHANGE: 0
VOMITING: 0
VOMITING: 0
DIARRHEA: 0
ABDOMINAL PAIN: 0
NAUSEA: 0
NAUSEA: 0
DIARRHEA: 0
TROUBLE SWALLOWING: 0
CONSTIPATION: 0
CHOKING: 0
COUGH: 0
COUGH: 0
SHORTNESS OF BREATH: 0
SINUS PRESSURE: 0
CHEST TIGHTNESS: 0
RHINORRHEA: 0
BACK PAIN: 0

## 2024-10-01 ASSESSMENT — PAIN SCALES - GENERAL
PAINLEVEL_OUTOF10: 5
PAINLEVEL_OUTOF10: 7
PAINLEVEL_OUTOF10: 5
PAINLEVEL_OUTOF10: 10
PAINLEVEL_OUTOF10: 5
PAINLEVEL_OUTOF10: 6
PAINLEVEL_OUTOF10: 2
PAINLEVEL_OUTOF10: 10
PAINLEVEL_OUTOF10: 7

## 2024-10-01 ASSESSMENT — PAIN DESCRIPTION - ORIENTATION
ORIENTATION: RIGHT;LEFT
ORIENTATION: RIGHT;LEFT
ORIENTATION: LEFT;RIGHT
ORIENTATION: LEFT;RIGHT
ORIENTATION: RIGHT;LEFT

## 2024-10-01 ASSESSMENT — PAIN DESCRIPTION - DESCRIPTORS
DESCRIPTORS: SHARP;SHOOTING

## 2024-10-01 ASSESSMENT — PAIN DESCRIPTION - LOCATION
LOCATION: ABDOMEN

## 2024-10-01 ASSESSMENT — PAIN - FUNCTIONAL ASSESSMENT
PAIN_FUNCTIONAL_ASSESSMENT: ACTIVITIES ARE NOT PREVENTED
PAIN_FUNCTIONAL_ASSESSMENT: 0-10
PAIN_FUNCTIONAL_ASSESSMENT: ACTIVITIES ARE NOT PREVENTED
PAIN_FUNCTIONAL_ASSESSMENT: PREVENTS OR INTERFERES SOME ACTIVE ACTIVITIES AND ADLS
PAIN_FUNCTIONAL_ASSESSMENT: ACTIVITIES ARE NOT PREVENTED

## 2024-10-01 NOTE — PLAN OF CARE
All patient belongings collected. IV and telemetry removed. Discharge paperwork given and explained to patient. Pt to follow up with outpatient consults. Pt to  scripts from home pharmacy.   Problem: Discharge Planning  Goal: Discharge to home or other facility with appropriate resources  Outcome: Completed     Problem: Pain  Goal: Verbalizes/displays adequate comfort level or baseline comfort level  Outcome: Completed     Problem: ABCDS Injury Assessment  Goal: Absence of physical injury  Outcome: Completed     Problem: Safety - Adult  Goal: Free from fall injury  Outcome: Completed     Problem: Respiratory - Adult  Goal: Achieves optimal ventilation and oxygenation  Outcome: Completed     Problem: Gastrointestinal - Adult  Goal: Minimal or absence of nausea and vomiting  Outcome: Completed     Problem: Gastrointestinal - Adult  Goal: Maintains or returns to baseline bowel function  Outcome: Completed     Problem: Gastrointestinal - Adult  Goal: Maintains adequate nutritional intake  Outcome: Completed     Problem: Infection - Adult  Goal: Absence of infection at discharge  Outcome: Completed     Problem: Nutrition Deficit:  Goal: Optimize nutritional status  Outcome: Completed

## 2024-10-01 NOTE — DISCHARGE SUMMARY
[facility], [address]  [facility] [address]  [phone]    Schedule an appointment as soon as possible for a visit in 1 month(s)      Brennon Reyes DO  [address] [facility] [address] [phone]    Call in 2 day(s)      Brennon Reyes DO  [address] [facility] [address] [phone]             Requiring Further Evaluation/Follow Up POST HOSPITALIZATION/Incidental Findings: follow up with PCP . Medications as advised,     Diet: regular diet    Activity: As tolerated.    Instructions to Patient: avoid alcohol while on antibiotics .     Discharge Medications:      Medication List        START taking these medications      ciprofloxacin 500 MG tablet  Commonly known as: CIPRO  Take 1 tablet by mouth 2 times daily for 12 days     metroNIDAZOLE 500 MG tablet  Commonly known as: FLAGYL  Take 1 tablet by mouth 3 times daily for 12 days     thiamine 100 MG/ML injection  Commonly known as: B-1  Infuse 1 mL intravenously daily  Start taking on: October 2, 2024            CONTINUE taking these medications      lisinopril 20 MG tablet  Commonly known as: PRINIVIL;ZESTRIL     omeprazole 20 MG delayed release capsule  Commonly known as: PRILOSEC               Where to Get Your Medications        These medications were sent to [address] [phone] - [facility] [phone]  [address] [place] [address]      Phone: [phone]   ciprofloxacin 500 MG tablet  metroNIDAZOLE 500 MG tablet  thiamine 100 MG/ML injection         Time Spent on discharge is  25 mins in patient examination, evaluation, counseling as well as medication reconciliation, prescriptions for required medications, discharge plan and follow up.    Electronically signed by   Jacob Kelly MD  10/1/2024        Thank you Brennon Gillette DO for the opportunity to be involved in this patient's care.     (This note is created with the assistance of a speech recognition program. While intending to generate a

## 2024-10-01 NOTE — ED PROVIDER NOTES
EMERGENCY DEPARTMENT ENCOUNTER    Pt Name: Abdifatah De Anda  MRN: [id number]  Birthdate 1985  Date of evaluation: 10/1/24  CHIEF COMPLAINT       Chief Complaint   Patient presents with    Abdominal Pain     HISTORY OF PRESENT ILLNESS   39-year-old male was recently admitted for diverticulitis.  Patient was discharged today and states as soon as he got home, the pain came really bad again.  Patient is not admitting to any nausea or vomiting at the current time.    The history is provided by the patient.   Abdominal Pain  Pain location:  Generalized  Associated symptoms: no chest pain, no cough, no diarrhea, no dysuria, no fatigue, no fever, no nausea, no shortness of breath and no vomiting            REVIEW OF SYSTEMS     Review of Systems   Constitutional:  Negative for activity change, fatigue and fever.   HENT:  Negative for congestion, ear pain and trouble swallowing.    Eyes:  Negative for photophobia and visual disturbance.   Respiratory:  Negative for cough and shortness of breath.    Cardiovascular:  Negative for chest pain and palpitations.   Gastrointestinal:  Positive for abdominal pain. Negative for diarrhea, nausea and vomiting.   Genitourinary:  Negative for dysuria, flank pain and urgency.   Musculoskeletal:  Negative for arthralgias and myalgias.   Skin:  Negative for color change and rash.   Neurological:  Negative for dizziness and facial asymmetry.   Psychiatric/Behavioral:  Negative for agitation and behavioral problems.      PASTMEDICAL HISTORY     Past Medical History:   Diagnosis Date    Alcohol abuse 05/23/2024    Ankylosing spondylitis (HCC) 05/27/2016    Anxiety     Arthritis, rheumatoid (HCC) 2014    Benign essential hypertension 07/17/2024    Chronic iritis of both eyes 05/18/2016    Last Assessment & Plan:   Formatting of this note might be different from the original.  Quiet today; Will follow closely; D/C PF1%  Formatting of this note might be different from the original.  Last  oriented to person, place, and time. Mental status is at baseline.   Psychiatric:         Mood and Affect: Mood normal.         Behavior: Behavior normal.         MEDICAL DECISION MAKING / ED COURSE:         1)  Number and Complexity of Problems Addressed at this Encounter      2)  Data Reviewed and Analyzed  (Lab and radiology tests/orders below in next section)          3)  Treatment and Disposition         Patient with abdominal pain since Thursday.  Patient was recently admitted for diverticulitis and was discharged earlier today.  The patient is requesting help with pain.  Patient was provided with a dose of morphine as well as normal saline fluids.  Lab work from earlier this morning from admission did appear to be stable.  The patient was hypokalemic but stated that he did receive potassium replacement while in the hospital.  Patient was resting comfortably upon reassessment but did express that the pain was still there.  Patient initially refused the morphine instead requesting Dilaudid.  Patient did accept morphine instead of Dilaudid.  Vitals are stable in the ER.  The patient was instructed to follow-up with the primary as well as pain management and to also follow-up with whoever he was instructed to follow-up with upon discharge.  Patient was instructed to return to the ER immediately if symptoms worsen or change.  Patient understands and agrees with the plan.  Patient denies any nausea or vomiting.    Patient does already have prescription for ciprofloxacin and Flagyl for diverticulitis to utilize outpatient.      CRITICAL CARE:       PROCEDURES:    Procedures      DATA FOR LAB AND RADIOLOGY TESTS ORDERED BELOW ARE REVIEWED BY THE ED CLINICIAN:    RADIOLOGY: All x-rays, CT, MRI, and formal ultrasound images (except ED bedside ultrasound) are read by the radiologist, see reports below, unless otherwise noted in MDM or here.  Reports below are reviewed by myself.  No orders to display       LABS: Lab

## 2024-10-01 NOTE — ED NOTES
RN at bedside to discharge patient, updated on prescription for motrin sent to pharmacy. Patient states \"keep that, I'm dying in pain. I'm going to go elsewhere.\" Patient informed patient that he needs to  antibiotics waiting at another pharmacy previously sent in for him. Patient verbalizes understanding.

## 2024-10-01 NOTE — PROGRESS NOTES
Gastroenterology Progress Note      Patient:   Abdifatah RAI Dzagulones   :    1985   Facility:   [facility]   Date:     10/1/2024  Consultant:   NIESHA MORENO      Subjective:     Patient seen and examined.   Better today wants to go home to get back to work.       Objective:   Vital Signs:  BP (!) 167/91   Pulse 65   Temp 99 °F (37.2 °C) (Oral)   Resp 16   Ht 1.651 m (5' 5\")   Wt 73.9 kg (163 lb)   SpO2 97%   BMI 27.12 kg/m²      Physical Exam:   General appearance: Alert, NAD  Lungs: CTA bilaterally    Heart:S1S2 mild tachycardia  Abdomen: Soft, NT, present bs  Skin:  No jaundice, no stigmata of chronic liver disease.    Lab and Imaging Review   CBC:   Lab Results   Component Value Date/Time    WBC 8.1 10/01/2024 04:55 AM    RBC 3.75 10/01/2024 04:55 AM    HGB 12.0 10/01/2024 04:55 AM    HCT 35.0 10/01/2024 04:55 AM    MCV 93.3 10/01/2024 04:55 AM    MCH 32.0 10/01/2024 04:55 AM    MCHC 34.3 10/01/2024 04:55 AM    RDW 12.7 10/01/2024 04:55 AM     10/01/2024 04:55 AM    MPV 10.3 10/01/2024 04:55 AM     WBC:    Lab Results   Component Value Date/Time    WBC 8.1 10/01/2024 04:55 AM     Platelets:    Lab Results   Component Value Date/Time     10/01/2024 04:55 AM     Hemoglobin/Hematocrit:    Lab Results   Component Value Date/Time    HGB 12.0 10/01/2024 04:55 AM    HCT 35.0 10/01/2024 04:55 AM     CMP:    Lab Results   Component Value Date/Time     10/01/2024 04:55 AM    K 3.2 10/01/2024 04:55 AM     10/01/2024 04:55 AM    CO2 24 10/01/2024 04:55 AM    BUN 2 10/01/2024 04:55 AM    CREATININE 0.5 10/01/2024 04:55 AM    GFRAA >60 2020 05:49 AM    LABGLOM >90 10/01/2024 04:55 AM    GLUCOSE 94 10/01/2024 04:55 AM    CALCIUM 8.3 10/01/2024 04:55 AM    BILITOT 0.3 10/01/2024 04:55 AM    ALKPHOS 69 10/01/2024 04:55 AM    AST 11 10/01/2024 04:55 AM    ALT 6 10/01/2024 04:55 AM     BMP:    Lab Results   Component Value Date/Time     10/01/2024 04:55 AM

## 2024-10-01 NOTE — PROGRESS NOTES
[facility]  Office: [phone]  Fady Flores DO, Chris Medina, DO, Nick Yarbrough DO, Gary eWi, DO, Aileen Lloyd MD, Marie Astorga MD, Jacob Kelly MD, Zaira Estrella MD,  German Ramos MD, Vi Castillo MD, Sigifredo Schwartz MD,  Denice Streeter DO, Sebastián Suggs MD, Tye Boateng MD, Stan Flores DO, Nida Wilhelm MD,  Damir Herrera DO, Mallory Brasher MD, Rosana Escalante MD, Conchis Jiménez MD, Salazar Trinh MD,  Santos Rai MD, Jonna Delgado MD, Raymundo Ambrocio MD, Silver Fisher MD, Angel Dhaliwal MD, Michael Lewis MD, Maxwell Sifuentes, DO, Camron Salas DO, Dillon Brizuela DO, Delvin Leija MD, Shirley Waterhouse, CNP,  Kasie Early CNP, Maxwell Lugo, CNP,  Desiree Hannah, [facility], Stacie Moss, CNP, Rosangela Kennedy, CNP, Lauren Franco, CNP, Layla Fritz, CNP, Leandra Vega, PA-C, Ivory Cool PA-C, Taina Cunningham, CNP, Denys Pond, CNP,  Anusha Romero, CNP, Twila Montgomery, CNP, Gretchen Rudolph, CNP, Maria De Jesus Sebastian, CNS, Michelle Thapa, CNP, Dulce Sky, CNP, Tracy Schwab, CNP       [facility]      Daily Progress Note     Admit Date: 9/27/2024  Bed/Room No.  1007/1007-02  Admitting Physician : Jacob Kelly MD  Code Status :Full Code  Hospital Day:  LOS: 4 days   Chief Complaint:     Chief Complaint   Patient presents with    Abdominal Pain     Pt reports being diagnosed w/ diverticulitis. Was discharged from [facility] today for same issue. Pt states that the pain has sice gotten worse.      Principal Problem:    Diverticulitis  Active Problems:    Ankylosing spondylitis (HCC)    Floaters    Immunosuppressed status (HCC)    Iridocyclitis associated with HLA-B27 positivity    Arthritis, rheumatoid (HCC)    Anxiety    Tobacco abuse    Alcohol abuse    Benign essential hypertension    History of opioid abuse (HCC)  Resolved Problems:    * No resolved hospital problems. *    Subjective :        Interval History/Significant events :

## 2024-10-01 NOTE — PROGRESS NOTES
Patient c/o left & right-sided, sharp/shooting abdominal pain treated effectively with PRN Dilaudid.  Patient goes outside to smoke independently.  Nicotine patch removed.  Patient c/o of increased restlessness & anxiety.  PRN Ativan given x1 per CIWA scale.  Alert & oriented x4.  Side rails x2 raised for patient safety.  Patient awaiting psychiatry consult for c/o anxiety & depression.  Patient was tearful at change of shift last evening.  Will monitor.

## 2024-10-01 NOTE — CONSULTS
Psychiatry arrived around 11 AM to see the patient due to consult for anxiety.  Patient was abruptly discharged and was unable to be seen.  It appears the patient is back in the ER now after discharge.  If considering reconsulting psychiatry please allow psychiatrist to see the patient prior to discharge.

## 2024-10-01 NOTE — ED NOTES
RN at bedside to start PIV and administer medications as ordered. patient states that he has been given morphine previously and that it does not work for his pain and is requesting dilaudid. RN to notify Dr Sutton.

## 2024-10-02 ENCOUNTER — ANESTHESIA (OUTPATIENT)
Dept: OPERATING ROOM | Age: 39
End: 2024-10-02
Payer: COMMERCIAL

## 2024-10-02 ENCOUNTER — ANESTHESIA EVENT (OUTPATIENT)
Dept: OPERATING ROOM | Age: 39
End: 2024-10-02
Payer: COMMERCIAL

## 2024-10-02 LAB
ABO + RH BLD: NORMAL
ALBUMIN SERPL-MCNC: 3 G/DL (ref 3.5–5.2)
ALP SERPL-CCNC: 70 U/L (ref 40–129)
ALT SERPL-CCNC: 5 U/L (ref 5–41)
ANION GAP SERPL CALCULATED.3IONS-SCNC: 15 MMOL/L (ref 9–17)
ARM BAND NUMBER: NORMAL
AST SERPL-CCNC: 10 U/L
BASOPHILS # BLD: 0 K/UL (ref 0–0.2)
BASOPHILS # BLD: 0.07 K/UL (ref 0–0.2)
BASOPHILS NFR BLD: 0 %
BASOPHILS NFR BLD: 0 % (ref 0–2)
BILIRUB SERPL-MCNC: 0.3 MG/DL (ref 0.3–1.2)
BLOOD BANK SAMPLE EXPIRATION: NORMAL
BLOOD GROUP ANTIBODIES SERPL: NEGATIVE
BUN SERPL-MCNC: 2 MG/DL (ref 6–20)
BUN/CREAT SERPL: 5 (ref 9–20)
CALCIUM SERPL-MCNC: 8.2 MG/DL (ref 8.6–10.4)
CHLORIDE SERPL-SCNC: 98 MMOL/L (ref 98–107)
CO2 SERPL-SCNC: 24 MMOL/L (ref 20–31)
CREAT SERPL-MCNC: 0.4 MG/DL (ref 0.7–1.2)
EOSINOPHIL # BLD: 0 K/UL (ref 0–0.4)
EOSINOPHIL # BLD: <0.03 K/UL (ref 0–0.44)
EOSINOPHILS RELATIVE PERCENT: 0 % (ref 1–4)
EOSINOPHILS RELATIVE PERCENT: 0 % (ref 1–4)
ERYTHROCYTE [DISTWIDTH] IN BLOOD BY AUTOMATED COUNT: 12.4 % (ref 11.8–14.4)
ERYTHROCYTE [DISTWIDTH] IN BLOOD BY AUTOMATED COUNT: 12.8 % (ref 11.8–14.4)
GFR, ESTIMATED: >90 ML/MIN/1.73M2
GLUCOSE BLD-MCNC: 124 MG/DL (ref 75–110)
GLUCOSE BLD-MCNC: 141 MG/DL (ref 75–110)
GLUCOSE SERPL-MCNC: 136 MG/DL (ref 70–99)
HCT VFR BLD AUTO: 39.7 % (ref 40.7–50.3)
HCT VFR BLD AUTO: 39.8 % (ref 40.7–50.3)
HGB BLD-MCNC: 13.9 G/DL (ref 13–17)
HGB BLD-MCNC: 14.1 G/DL (ref 13–17)
IMM GRANULOCYTES # BLD AUTO: 0 K/UL (ref 0–0.3)
IMM GRANULOCYTES # BLD AUTO: 0.17 K/UL (ref 0–0.3)
IMM GRANULOCYTES NFR BLD: 0 %
IMM GRANULOCYTES NFR BLD: 1 %
LACTATE BLDV-SCNC: 1 MMOL/L (ref 0.5–1.9)
LACTATE BLDV-SCNC: 2.4 MMOL/L (ref 0.5–1.9)
LYMPHOCYTES NFR BLD: 0.85 K/UL (ref 1–4.8)
LYMPHOCYTES NFR BLD: 0.88 K/UL (ref 1.1–3.7)
LYMPHOCYTES RELATIVE PERCENT: 4 % (ref 24–43)
LYMPHOCYTES RELATIVE PERCENT: 6 % (ref 24–44)
MAGNESIUM SERPL-MCNC: 1.5 MG/DL (ref 1.6–2.6)
MCH RBC QN AUTO: 32 PG (ref 25.2–33.5)
MCH RBC QN AUTO: 32 PG (ref 25.2–33.5)
MCHC RBC AUTO-ENTMCNC: 35 G/DL (ref 28.4–34.8)
MCHC RBC AUTO-ENTMCNC: 35.4 G/DL (ref 28.4–34.8)
MCV RBC AUTO: 90.5 FL (ref 82.6–102.9)
MCV RBC AUTO: 91.5 FL (ref 82.6–102.9)
MONOCYTES NFR BLD: 1.11 K/UL (ref 0.1–1.2)
MONOCYTES NFR BLD: 1.28 K/UL (ref 0.2–0.8)
MONOCYTES NFR BLD: 5 % (ref 3–12)
MONOCYTES NFR BLD: 9 % (ref 1–7)
NEUTROPHILS NFR BLD: 85 % (ref 36–66)
NEUTROPHILS NFR BLD: 90 % (ref 36–65)
NEUTS SEG NFR BLD: 12.07 K/UL (ref 1.8–7.7)
NEUTS SEG NFR BLD: 18.45 K/UL (ref 1.5–8.1)
NRBC BLD-RTO: 0 PER 100 WBC
NRBC BLD-RTO: 0 PER 100 WBC
PHOSPHATE SERPL-MCNC: 3 MG/DL (ref 2.5–4.5)
PLATELET # BLD AUTO: 295 K/UL (ref 138–453)
PLATELET # BLD AUTO: 341 K/UL (ref 138–453)
PMV BLD AUTO: 10.4 FL (ref 8.1–13.5)
PMV BLD AUTO: 9.8 FL (ref 8.1–13.5)
POTASSIUM SERPL-SCNC: 3.6 MMOL/L (ref 3.7–5.3)
PROT SERPL-MCNC: 5.7 G/DL (ref 6.4–8.3)
RBC # BLD AUTO: 4.34 M/UL (ref 4.21–5.77)
RBC # BLD AUTO: 4.4 M/UL (ref 4.21–5.77)
SODIUM SERPL-SCNC: 137 MMOL/L (ref 135–144)
WBC OTHER # BLD: 14.2 K/UL (ref 3.5–11.3)
WBC OTHER # BLD: 20.7 K/UL (ref 3.5–11.3)

## 2024-10-02 PROCEDURE — 0W9J0ZZ DRAINAGE OF PELVIC CAVITY, OPEN APPROACH: ICD-10-PCS | Performed by: SURGERY

## 2024-10-02 PROCEDURE — 2500000003 HC RX 250 WO HCPCS: Performed by: FAMILY MEDICINE

## 2024-10-02 PROCEDURE — 6360000002 HC RX W HCPCS: Performed by: ANESTHESIOLOGY

## 2024-10-02 PROCEDURE — 7100000001 HC PACU RECOVERY - ADDTL 15 MIN: Performed by: SURGERY

## 2024-10-02 PROCEDURE — 6360000002 HC RX W HCPCS: Performed by: SURGERY

## 2024-10-02 PROCEDURE — 6360000002 HC RX W HCPCS: Performed by: NURSE PRACTITIONER

## 2024-10-02 PROCEDURE — 3600000013 HC SURGERY LEVEL 3 ADDTL 15MIN: Performed by: SURGERY

## 2024-10-02 PROCEDURE — 2500000003 HC RX 250 WO HCPCS

## 2024-10-02 PROCEDURE — 87070 CULTURE OTHR SPECIMN AEROBIC: CPT

## 2024-10-02 PROCEDURE — 6360000002 HC RX W HCPCS: Performed by: EMERGENCY MEDICINE

## 2024-10-02 PROCEDURE — 0D1M0Z4 BYPASS DESCENDING COLON TO CUTANEOUS, OPEN APPROACH: ICD-10-PCS | Performed by: SURGERY

## 2024-10-02 PROCEDURE — 83735 ASSAY OF MAGNESIUM: CPT

## 2024-10-02 PROCEDURE — 7100000000 HC PACU RECOVERY - FIRST 15 MIN: Performed by: SURGERY

## 2024-10-02 PROCEDURE — 6370000000 HC RX 637 (ALT 250 FOR IP)

## 2024-10-02 PROCEDURE — 6360000002 HC RX W HCPCS

## 2024-10-02 PROCEDURE — 2580000003 HC RX 258: Performed by: NURSE ANESTHETIST, CERTIFIED REGISTERED

## 2024-10-02 PROCEDURE — 3700000000 HC ANESTHESIA ATTENDED CARE: Performed by: SURGERY

## 2024-10-02 PROCEDURE — 85025 COMPLETE CBC W/AUTO DIFF WBC: CPT

## 2024-10-02 PROCEDURE — 2709999900 HC NON-CHARGEABLE SUPPLY: Performed by: SURGERY

## 2024-10-02 PROCEDURE — 6370000000 HC RX 637 (ALT 250 FOR IP): Performed by: NURSE PRACTITIONER

## 2024-10-02 PROCEDURE — 86900 BLOOD TYPING SEROLOGIC ABO: CPT

## 2024-10-02 PROCEDURE — 6360000002 HC RX W HCPCS: Performed by: NURSE ANESTHETIST, CERTIFIED REGISTERED

## 2024-10-02 PROCEDURE — 6370000000 HC RX 637 (ALT 250 FOR IP): Performed by: SURGERY

## 2024-10-02 PROCEDURE — 86901 BLOOD TYPING SEROLOGIC RH(D): CPT

## 2024-10-02 PROCEDURE — 3700000001 HC ADD 15 MINUTES (ANESTHESIA): Performed by: SURGERY

## 2024-10-02 PROCEDURE — 1200000000 HC SEMI PRIVATE

## 2024-10-02 PROCEDURE — 86850 RBC ANTIBODY SCREEN: CPT

## 2024-10-02 PROCEDURE — 2500000003 HC RX 250 WO HCPCS: Performed by: SURGERY

## 2024-10-02 PROCEDURE — 2500000003 HC RX 250 WO HCPCS: Performed by: NURSE ANESTHETIST, CERTIFIED REGISTERED

## 2024-10-02 PROCEDURE — 87205 SMEAR GRAM STAIN: CPT

## 2024-10-02 PROCEDURE — 99232 SBSQ HOSP IP/OBS MODERATE 35: CPT | Performed by: FAMILY MEDICINE

## 2024-10-02 PROCEDURE — 80053 COMPREHEN METABOLIC PANEL: CPT

## 2024-10-02 PROCEDURE — 2720000010 HC SURG SUPPLY STERILE: Performed by: SURGERY

## 2024-10-02 PROCEDURE — 82947 ASSAY GLUCOSE BLOOD QUANT: CPT

## 2024-10-02 PROCEDURE — 2580000003 HC RX 258

## 2024-10-02 PROCEDURE — 88307 TISSUE EXAM BY PATHOLOGIST: CPT

## 2024-10-02 PROCEDURE — 2580000003 HC RX 258: Performed by: SURGERY

## 2024-10-02 PROCEDURE — 2580000003 HC RX 258: Performed by: EMERGENCY MEDICINE

## 2024-10-02 PROCEDURE — 6360000002 HC RX W HCPCS: Performed by: FAMILY MEDICINE

## 2024-10-02 PROCEDURE — 0DBN0ZZ EXCISION OF SIGMOID COLON, OPEN APPROACH: ICD-10-PCS | Performed by: SURGERY

## 2024-10-02 PROCEDURE — 87075 CULTR BACTERIA EXCEPT BLOOD: CPT

## 2024-10-02 PROCEDURE — 84100 ASSAY OF PHOSPHORUS: CPT

## 2024-10-02 PROCEDURE — 3600000003 HC SURGERY LEVEL 3 BASE: Performed by: SURGERY

## 2024-10-02 PROCEDURE — 36415 COLL VENOUS BLD VENIPUNCTURE: CPT

## 2024-10-02 RX ORDER — PROPOFOL 10 MG/ML
INJECTION, EMULSION INTRAVENOUS
Status: DISCONTINUED | OUTPATIENT
Start: 2024-10-02 | End: 2024-10-02 | Stop reason: SDUPTHER

## 2024-10-02 RX ORDER — DEXTROSE MONOHYDRATE, SODIUM CHLORIDE, AND POTASSIUM CHLORIDE 50; 1.49; 4.5 G/1000ML; G/1000ML; G/1000ML
INJECTION, SOLUTION INTRAVENOUS CONTINUOUS
Status: DISCONTINUED | OUTPATIENT
Start: 2024-10-02 | End: 2024-10-02

## 2024-10-02 RX ORDER — MEPERIDINE HYDROCHLORIDE 50 MG/ML
12.5 INJECTION INTRAMUSCULAR; INTRAVENOUS; SUBCUTANEOUS EVERY 5 MIN PRN
Status: DISCONTINUED | OUTPATIENT
Start: 2024-10-02 | End: 2024-10-02 | Stop reason: HOSPADM

## 2024-10-02 RX ORDER — FAMOTIDINE 20 MG/1
20 TABLET, FILM COATED ORAL 2 TIMES DAILY
Status: DISCONTINUED | OUTPATIENT
Start: 2024-10-02 | End: 2024-10-02

## 2024-10-02 RX ORDER — METOCLOPRAMIDE HYDROCHLORIDE 5 MG/ML
10 INJECTION INTRAMUSCULAR; INTRAVENOUS EVERY 6 HOURS
Status: DISCONTINUED | OUTPATIENT
Start: 2024-10-02 | End: 2024-10-02

## 2024-10-02 RX ORDER — HYDROMORPHONE HYDROCHLORIDE 1 MG/ML
1 INJECTION, SOLUTION INTRAMUSCULAR; INTRAVENOUS; SUBCUTANEOUS
Status: DISCONTINUED | OUTPATIENT
Start: 2024-10-02 | End: 2024-10-03

## 2024-10-02 RX ORDER — POTASSIUM CHLORIDE 1500 MG/1
40 TABLET, EXTENDED RELEASE ORAL PRN
Status: DISCONTINUED | OUTPATIENT
Start: 2024-10-02 | End: 2024-10-02

## 2024-10-02 RX ORDER — SODIUM CHLORIDE, SODIUM LACTATE, POTASSIUM CHLORIDE, CALCIUM CHLORIDE 600; 310; 30; 20 MG/100ML; MG/100ML; MG/100ML; MG/100ML
INJECTION, SOLUTION INTRAVENOUS
Status: DISCONTINUED | OUTPATIENT
Start: 2024-10-02 | End: 2024-10-02 | Stop reason: SDUPTHER

## 2024-10-02 RX ORDER — CIPROFLOXACIN 2 MG/ML
INJECTION, SOLUTION INTRAVENOUS
Status: DISCONTINUED | OUTPATIENT
Start: 2024-10-02 | End: 2024-10-02 | Stop reason: SDUPTHER

## 2024-10-02 RX ORDER — MAGNESIUM SULFATE 1 G/100ML
1000 INJECTION INTRAVENOUS PRN
Status: DISCONTINUED | OUTPATIENT
Start: 2024-10-02 | End: 2024-10-10 | Stop reason: HOSPADM

## 2024-10-02 RX ORDER — PROCHLORPERAZINE EDISYLATE 5 MG/ML
10 INJECTION INTRAMUSCULAR; INTRAVENOUS
Status: DISCONTINUED | OUTPATIENT
Start: 2024-10-02 | End: 2024-10-02 | Stop reason: HOSPADM

## 2024-10-02 RX ORDER — ROCURONIUM BROMIDE 10 MG/ML
INJECTION, SOLUTION INTRAVENOUS
Status: DISCONTINUED | OUTPATIENT
Start: 2024-10-02 | End: 2024-10-02 | Stop reason: SDUPTHER

## 2024-10-02 RX ORDER — SODIUM CHLORIDE 0.9 % (FLUSH) 0.9 %
10 SYRINGE (ML) INJECTION PRN
Status: DISCONTINUED | OUTPATIENT
Start: 2024-10-02 | End: 2024-10-10 | Stop reason: HOSPADM

## 2024-10-02 RX ORDER — NICOTINE 21 MG/24HR
1 PATCH, TRANSDERMAL 24 HOURS TRANSDERMAL DAILY
Status: DISCONTINUED | OUTPATIENT
Start: 2024-10-02 | End: 2024-10-06

## 2024-10-02 RX ORDER — CIPROFLOXACIN 2 MG/ML
200 INJECTION, SOLUTION INTRAVENOUS EVERY 12 HOURS
Status: DISCONTINUED | OUTPATIENT
Start: 2024-10-02 | End: 2024-10-07

## 2024-10-02 RX ORDER — SODIUM CHLORIDE 0.9 % (FLUSH) 0.9 %
5-40 SYRINGE (ML) INJECTION EVERY 12 HOURS SCHEDULED
Status: DISCONTINUED | OUTPATIENT
Start: 2024-10-02 | End: 2024-10-10 | Stop reason: HOSPADM

## 2024-10-02 RX ORDER — OXYCODONE HYDROCHLORIDE 5 MG/1
5 TABLET ORAL
Status: DISCONTINUED | OUTPATIENT
Start: 2024-10-02 | End: 2024-10-02 | Stop reason: HOSPADM

## 2024-10-02 RX ORDER — DEXTROSE MONOHYDRATE, SODIUM CHLORIDE, AND POTASSIUM CHLORIDE 50; 1.49; 4.5 G/1000ML; G/1000ML; G/1000ML
INJECTION, SOLUTION INTRAVENOUS CONTINUOUS
Status: DISCONTINUED | OUTPATIENT
Start: 2024-10-02 | End: 2024-10-05

## 2024-10-02 RX ORDER — 0.9 % SODIUM CHLORIDE 0.9 %
1000 INTRAVENOUS SOLUTION INTRAVENOUS ONCE
Status: DISCONTINUED | OUTPATIENT
Start: 2024-10-02 | End: 2024-10-02

## 2024-10-02 RX ORDER — SODIUM CHLORIDE 9 MG/ML
INJECTION, SOLUTION INTRAVENOUS PRN
Status: DISCONTINUED | OUTPATIENT
Start: 2024-10-02 | End: 2024-10-02 | Stop reason: HOSPADM

## 2024-10-02 RX ORDER — FENTANYL CITRATE 50 UG/ML
INJECTION, SOLUTION INTRAMUSCULAR; INTRAVENOUS
Status: DISCONTINUED | OUTPATIENT
Start: 2024-10-02 | End: 2024-10-02 | Stop reason: SDUPTHER

## 2024-10-02 RX ORDER — ENOXAPARIN SODIUM 100 MG/ML
40 INJECTION SUBCUTANEOUS DAILY
Status: DISCONTINUED | OUTPATIENT
Start: 2024-10-02 | End: 2024-10-02

## 2024-10-02 RX ORDER — CIPROFLOXACIN 2 MG/ML
200 INJECTION, SOLUTION INTRAVENOUS EVERY 12 HOURS
Status: DISCONTINUED | OUTPATIENT
Start: 2024-10-02 | End: 2024-10-02

## 2024-10-02 RX ORDER — METOCLOPRAMIDE HYDROCHLORIDE 5 MG/ML
10 INJECTION INTRAMUSCULAR; INTRAVENOUS
Status: DISCONTINUED | OUTPATIENT
Start: 2024-10-02 | End: 2024-10-02 | Stop reason: HOSPADM

## 2024-10-02 RX ORDER — HYDROMORPHONE HYDROCHLORIDE 1 MG/ML
0.5 INJECTION, SOLUTION INTRAMUSCULAR; INTRAVENOUS; SUBCUTANEOUS EVERY 5 MIN PRN
Status: DISCONTINUED | OUTPATIENT
Start: 2024-10-02 | End: 2024-10-02 | Stop reason: HOSPADM

## 2024-10-02 RX ORDER — DIPHENHYDRAMINE HYDROCHLORIDE 50 MG/ML
25 INJECTION INTRAMUSCULAR; INTRAVENOUS ONCE
Status: COMPLETED | OUTPATIENT
Start: 2024-10-02 | End: 2024-10-03

## 2024-10-02 RX ORDER — ONDANSETRON 2 MG/ML
INJECTION INTRAMUSCULAR; INTRAVENOUS
Status: DISCONTINUED | OUTPATIENT
Start: 2024-10-02 | End: 2024-10-02 | Stop reason: SDUPTHER

## 2024-10-02 RX ORDER — BUPIVACAINE HYDROCHLORIDE AND EPINEPHRINE 5; 5 MG/ML; UG/ML
INJECTION, SOLUTION EPIDURAL; INTRACAUDAL; PERINEURAL PRN
Status: DISCONTINUED | OUTPATIENT
Start: 2024-10-02 | End: 2024-10-02 | Stop reason: ALTCHOICE

## 2024-10-02 RX ORDER — ESMOLOL HYDROCHLORIDE 10 MG/ML
INJECTION INTRAVENOUS
Status: DISCONTINUED | OUTPATIENT
Start: 2024-10-02 | End: 2024-10-02 | Stop reason: SDUPTHER

## 2024-10-02 RX ORDER — KETOROLAC TROMETHAMINE 30 MG/ML
30 INJECTION, SOLUTION INTRAMUSCULAR; INTRAVENOUS ONCE
Status: COMPLETED | OUTPATIENT
Start: 2024-10-02 | End: 2024-10-02

## 2024-10-02 RX ORDER — LIDOCAINE HYDROCHLORIDE 20 MG/ML
INJECTION, SOLUTION EPIDURAL; INFILTRATION; INTRACAUDAL; PERINEURAL
Status: DISCONTINUED | OUTPATIENT
Start: 2024-10-02 | End: 2024-10-02 | Stop reason: SDUPTHER

## 2024-10-02 RX ORDER — SODIUM CHLORIDE 9 MG/ML
INJECTION, SOLUTION INTRAVENOUS PRN
Status: DISCONTINUED | OUTPATIENT
Start: 2024-10-02 | End: 2024-10-10 | Stop reason: HOSPADM

## 2024-10-02 RX ORDER — KETOROLAC TROMETHAMINE 30 MG/ML
30 INJECTION, SOLUTION INTRAMUSCULAR; INTRAVENOUS EVERY 6 HOURS
Status: DISCONTINUED | OUTPATIENT
Start: 2024-10-02 | End: 2024-10-05

## 2024-10-02 RX ORDER — DIPHENHYDRAMINE HYDROCHLORIDE 50 MG/ML
12.5 INJECTION INTRAMUSCULAR; INTRAVENOUS
Status: DISCONTINUED | OUTPATIENT
Start: 2024-10-02 | End: 2024-10-02 | Stop reason: HOSPADM

## 2024-10-02 RX ORDER — ACETAMINOPHEN 325 MG/1
650 TABLET ORAL EVERY 6 HOURS PRN
Status: DISCONTINUED | OUTPATIENT
Start: 2024-10-02 | End: 2024-10-02

## 2024-10-02 RX ORDER — NALOXONE HYDROCHLORIDE 0.4 MG/ML
INJECTION, SOLUTION INTRAMUSCULAR; INTRAVENOUS; SUBCUTANEOUS PRN
Status: DISCONTINUED | OUTPATIENT
Start: 2024-10-02 | End: 2024-10-02 | Stop reason: HOSPADM

## 2024-10-02 RX ORDER — POTASSIUM CHLORIDE 7.45 MG/ML
10 INJECTION INTRAVENOUS PRN
Status: DISCONTINUED | OUTPATIENT
Start: 2024-10-02 | End: 2024-10-02

## 2024-10-02 RX ORDER — FENTANYL CITRATE 50 UG/ML
25 INJECTION, SOLUTION INTRAMUSCULAR; INTRAVENOUS EVERY 5 MIN PRN
Status: DISCONTINUED | OUTPATIENT
Start: 2024-10-02 | End: 2024-10-02 | Stop reason: HOSPADM

## 2024-10-02 RX ORDER — HYDROMORPHONE HYDROCHLORIDE 2 MG/ML
INJECTION, SOLUTION INTRAMUSCULAR; INTRAVENOUS; SUBCUTANEOUS
Status: DISCONTINUED | OUTPATIENT
Start: 2024-10-02 | End: 2024-10-02 | Stop reason: SDUPTHER

## 2024-10-02 RX ORDER — POLYETHYLENE GLYCOL 3350 17 G/17G
17 POWDER, FOR SOLUTION ORAL DAILY PRN
Status: DISCONTINUED | OUTPATIENT
Start: 2024-10-02 | End: 2024-10-02

## 2024-10-02 RX ORDER — ONDANSETRON 4 MG/1
4 TABLET, ORALLY DISINTEGRATING ORAL EVERY 8 HOURS PRN
Status: DISCONTINUED | OUTPATIENT
Start: 2024-10-02 | End: 2024-10-10 | Stop reason: HOSPADM

## 2024-10-02 RX ORDER — TRAZODONE HYDROCHLORIDE 100 MG/1
100 TABLET ORAL NIGHTLY PRN
Status: DISCONTINUED | OUTPATIENT
Start: 2024-10-02 | End: 2024-10-10 | Stop reason: HOSPADM

## 2024-10-02 RX ORDER — SODIUM CHLORIDE 0.9 % (FLUSH) 0.9 %
5-40 SYRINGE (ML) INJECTION EVERY 12 HOURS SCHEDULED
Status: DISCONTINUED | OUTPATIENT
Start: 2024-10-02 | End: 2024-10-02 | Stop reason: HOSPADM

## 2024-10-02 RX ORDER — CIPROFLOXACIN 2 MG/ML
400 INJECTION, SOLUTION INTRAVENOUS EVERY 12 HOURS
Status: DISCONTINUED | OUTPATIENT
Start: 2024-10-02 | End: 2024-10-02 | Stop reason: SDUPTHER

## 2024-10-02 RX ORDER — MIDAZOLAM HYDROCHLORIDE 1 MG/ML
INJECTION INTRAMUSCULAR; INTRAVENOUS
Status: DISCONTINUED | OUTPATIENT
Start: 2024-10-02 | End: 2024-10-02 | Stop reason: SDUPTHER

## 2024-10-02 RX ORDER — CIPROFLOXACIN 2 MG/ML
400 INJECTION, SOLUTION INTRAVENOUS EVERY 12 HOURS
Status: DISCONTINUED | OUTPATIENT
Start: 2024-10-02 | End: 2024-10-02 | Stop reason: RX

## 2024-10-02 RX ORDER — DEXAMETHASONE SODIUM PHOSPHATE 10 MG/ML
INJECTION, SOLUTION INTRAMUSCULAR; INTRAVENOUS
Status: DISCONTINUED | OUTPATIENT
Start: 2024-10-02 | End: 2024-10-02 | Stop reason: SDUPTHER

## 2024-10-02 RX ORDER — SODIUM CHLORIDE 0.9 % (FLUSH) 0.9 %
5-40 SYRINGE (ML) INJECTION PRN
Status: DISCONTINUED | OUTPATIENT
Start: 2024-10-02 | End: 2024-10-02 | Stop reason: HOSPADM

## 2024-10-02 RX ORDER — ACETAMINOPHEN 650 MG/1
650 SUPPOSITORY RECTAL EVERY 6 HOURS PRN
Status: DISCONTINUED | OUTPATIENT
Start: 2024-10-02 | End: 2024-10-02

## 2024-10-02 RX ORDER — ONDANSETRON 2 MG/ML
4 INJECTION INTRAMUSCULAR; INTRAVENOUS EVERY 6 HOURS PRN
Status: DISCONTINUED | OUTPATIENT
Start: 2024-10-02 | End: 2024-10-10 | Stop reason: HOSPADM

## 2024-10-02 RX ORDER — DIPHENHYDRAMINE HYDROCHLORIDE 50 MG/ML
25 INJECTION INTRAMUSCULAR; INTRAVENOUS ONCE
Status: DISCONTINUED | OUTPATIENT
Start: 2024-10-02 | End: 2024-10-02

## 2024-10-02 RX ORDER — KETOROLAC TROMETHAMINE 15 MG/ML
15 INJECTION, SOLUTION INTRAMUSCULAR; INTRAVENOUS EVERY 6 HOURS
Status: DISCONTINUED | OUTPATIENT
Start: 2024-10-02 | End: 2024-10-02

## 2024-10-02 RX ORDER — METRONIDAZOLE 500 MG/100ML
500 INJECTION, SOLUTION INTRAVENOUS EVERY 8 HOURS
Status: DISCONTINUED | OUTPATIENT
Start: 2024-10-02 | End: 2024-10-08

## 2024-10-02 RX ADMIN — HYDROMORPHONE HYDROCHLORIDE 1 MG: 1 INJECTION, SOLUTION INTRAMUSCULAR; INTRAVENOUS; SUBCUTANEOUS at 20:30

## 2024-10-02 RX ADMIN — Medication 30 MG: at 12:02

## 2024-10-02 RX ADMIN — ROCURONIUM BROMIDE 20 MG: 10 INJECTION, SOLUTION INTRAVENOUS at 12:02

## 2024-10-02 RX ADMIN — SODIUM CHLORIDE 1256 ML: 9 INJECTION, SOLUTION INTRAVENOUS at 00:43

## 2024-10-02 RX ADMIN — FENTANYL CITRATE 25 MCG: 50 INJECTION INTRAMUSCULAR; INTRAVENOUS at 14:20

## 2024-10-02 RX ADMIN — ESMOLOL HYDROCHLORIDE 10 MG: 10 INJECTION, SOLUTION INTRAVENOUS at 12:26

## 2024-10-02 RX ADMIN — SODIUM CHLORIDE, PRESERVATIVE FREE 40 MG: 5 INJECTION INTRAVENOUS at 14:52

## 2024-10-02 RX ADMIN — HYDROMORPHONE HYDROCHLORIDE 1 MG: 1 INJECTION, SOLUTION INTRAMUSCULAR; INTRAVENOUS; SUBCUTANEOUS at 23:31

## 2024-10-02 RX ADMIN — KETOROLAC TROMETHAMINE 30 MG: 30 INJECTION, SOLUTION INTRAMUSCULAR at 03:03

## 2024-10-02 RX ADMIN — METOCLOPRAMIDE HYDROCHLORIDE 10 MG: 5 INJECTION INTRAMUSCULAR; INTRAVENOUS at 02:06

## 2024-10-02 RX ADMIN — KETOROLAC TROMETHAMINE 15 MG: 15 INJECTION, SOLUTION INTRAMUSCULAR; INTRAVENOUS at 14:52

## 2024-10-02 RX ADMIN — METRONIDAZOLE 500 MG: 500 INJECTION, SOLUTION INTRAVENOUS at 02:15

## 2024-10-02 RX ADMIN — METOCLOPRAMIDE HYDROCHLORIDE 10 MG: 5 INJECTION INTRAMUSCULAR; INTRAVENOUS at 08:06

## 2024-10-02 RX ADMIN — ROCURONIUM BROMIDE 10 MG: 10 INJECTION, SOLUTION INTRAVENOUS at 12:29

## 2024-10-02 RX ADMIN — ONDANSETRON 4 MG: 2 INJECTION, SOLUTION INTRAMUSCULAR; INTRAVENOUS at 13:21

## 2024-10-02 RX ADMIN — POTASSIUM CHLORIDE, DEXTROSE MONOHYDRATE AND SODIUM CHLORIDE: 150; 5; 450 INJECTION, SOLUTION INTRAVENOUS at 17:38

## 2024-10-02 RX ADMIN — METRONIDAZOLE 500 MG: 500 INJECTION, SOLUTION INTRAVENOUS at 10:05

## 2024-10-02 RX ADMIN — SODIUM CHLORIDE, PRESERVATIVE FREE 10 ML: 5 INJECTION INTRAVENOUS at 20:50

## 2024-10-02 RX ADMIN — FAMOTIDINE 20 MG: 20 TABLET, FILM COATED ORAL at 03:02

## 2024-10-02 RX ADMIN — CIPROFLOXACIN 200 MG: 2 INJECTION, SOLUTION INTRAVENOUS at 17:39

## 2024-10-02 RX ADMIN — ESMOLOL HYDROCHLORIDE 10 MG: 10 INJECTION, SOLUTION INTRAVENOUS at 13:38

## 2024-10-02 RX ADMIN — MIDAZOLAM 2 MG: 1 INJECTION INTRAMUSCULAR; INTRAVENOUS at 11:40

## 2024-10-02 RX ADMIN — HYDROMORPHONE HYDROCHLORIDE 0.5 MG: 1 INJECTION, SOLUTION INTRAMUSCULAR; INTRAVENOUS; SUBCUTANEOUS at 14:05

## 2024-10-02 RX ADMIN — CIPROFLOXACIN 200 MG: 2 INJECTION, SOLUTION INTRAVENOUS at 18:51

## 2024-10-02 RX ADMIN — HYDROMORPHONE HYDROCHLORIDE 0.5 MG: 1 INJECTION, SOLUTION INTRAMUSCULAR; INTRAVENOUS; SUBCUTANEOUS at 04:21

## 2024-10-02 RX ADMIN — ROCURONIUM BROMIDE 50 MG: 10 INJECTION, SOLUTION INTRAVENOUS at 11:48

## 2024-10-02 RX ADMIN — HYDROMORPHONE HYDROCHLORIDE 1 MG: 1 INJECTION, SOLUTION INTRAMUSCULAR; INTRAVENOUS; SUBCUTANEOUS at 17:29

## 2024-10-02 RX ADMIN — METRONIDAZOLE 500 MG: 500 INJECTION, SOLUTION INTRAVENOUS at 20:58

## 2024-10-02 RX ADMIN — FENTANYL CITRATE 25 MCG: 50 INJECTION INTRAMUSCULAR; INTRAVENOUS at 14:15

## 2024-10-02 RX ADMIN — FENTANYL CITRATE 100 MCG: 50 INJECTION INTRAMUSCULAR; INTRAVENOUS at 11:48

## 2024-10-02 RX ADMIN — FENTANYL CITRATE 50 MCG: 50 INJECTION INTRAMUSCULAR; INTRAVENOUS at 12:02

## 2024-10-02 RX ADMIN — MAGNESIUM SULFATE HEPTAHYDRATE 1000 MG: 1 INJECTION, SOLUTION INTRAVENOUS at 15:59

## 2024-10-02 RX ADMIN — SODIUM CHLORIDE: 9 INJECTION, SOLUTION INTRAVENOUS at 10:05

## 2024-10-02 RX ADMIN — HYDROMORPHONE HYDROCHLORIDE 0.5 MG: 1 INJECTION, SOLUTION INTRAMUSCULAR; INTRAVENOUS; SUBCUTANEOUS at 14:10

## 2024-10-02 RX ADMIN — SUGAMMADEX 148 MG: 100 INJECTION, SOLUTION INTRAVENOUS at 13:36

## 2024-10-02 RX ADMIN — CIPROFLOXACIN 200 MG: 2 INJECTION, SOLUTION INTRAVENOUS at 02:11

## 2024-10-02 RX ADMIN — Medication 20 MG: at 12:43

## 2024-10-02 RX ADMIN — CIPROFLOXACIN 400 MG: 2 INJECTION, SOLUTION INTRAVENOUS at 12:10

## 2024-10-02 RX ADMIN — ESMOLOL HYDROCHLORIDE 10 MG: 10 INJECTION, SOLUTION INTRAVENOUS at 12:10

## 2024-10-02 RX ADMIN — HYDROMORPHONE HYDROCHLORIDE 0.5 MG: 1 INJECTION, SOLUTION INTRAMUSCULAR; INTRAVENOUS; SUBCUTANEOUS at 10:05

## 2024-10-02 RX ADMIN — HYDROMORPHONE HYDROCHLORIDE 0.5 MG: 1 INJECTION, SOLUTION INTRAMUSCULAR; INTRAVENOUS; SUBCUTANEOUS at 15:54

## 2024-10-02 RX ADMIN — HYDROMORPHONE HYDROCHLORIDE 0.5 MG: 1 INJECTION, SOLUTION INTRAMUSCULAR; INTRAVENOUS; SUBCUTANEOUS at 01:08

## 2024-10-02 RX ADMIN — SODIUM CHLORIDE: 9 INJECTION, SOLUTION INTRAVENOUS at 20:58

## 2024-10-02 RX ADMIN — POTASSIUM CHLORIDE, DEXTROSE MONOHYDRATE AND SODIUM CHLORIDE: 150; 5; 450 INJECTION, SOLUTION INTRAVENOUS at 02:17

## 2024-10-02 RX ADMIN — MAGNESIUM SULFATE HEPTAHYDRATE 1000 MG: 1 INJECTION, SOLUTION INTRAVENOUS at 14:58

## 2024-10-02 RX ADMIN — HYDROMORPHONE HYDROCHLORIDE 0.5 MG: 2 INJECTION, SOLUTION INTRAMUSCULAR; INTRAVENOUS; SUBCUTANEOUS at 13:33

## 2024-10-02 RX ADMIN — POTASSIUM CHLORIDE 40 MEQ: 1500 TABLET, EXTENDED RELEASE ORAL at 03:02

## 2024-10-02 RX ADMIN — FENTANYL CITRATE 50 MCG: 50 INJECTION INTRAMUSCULAR; INTRAVENOUS at 12:25

## 2024-10-02 RX ADMIN — HYDROMORPHONE HYDROCHLORIDE 0.5 MG: 2 INJECTION, SOLUTION INTRAMUSCULAR; INTRAVENOUS; SUBCUTANEOUS at 13:41

## 2024-10-02 RX ADMIN — SODIUM CHLORIDE: 9 INJECTION, SOLUTION INTRAVENOUS at 14:57

## 2024-10-02 RX ADMIN — CIPROFLOXACIN 200 MG: 2 INJECTION, SOLUTION INTRAVENOUS at 00:45

## 2024-10-02 RX ADMIN — LIDOCAINE HYDROCHLORIDE 60 MG: 20 INJECTION, SOLUTION EPIDURAL; INFILTRATION; INTRACAUDAL; PERINEURAL at 11:48

## 2024-10-02 RX ADMIN — HYDROMORPHONE HYDROCHLORIDE 0.5 MG: 1 INJECTION, SOLUTION INTRAMUSCULAR; INTRAVENOUS; SUBCUTANEOUS at 06:03

## 2024-10-02 RX ADMIN — HYDROMORPHONE HYDROCHLORIDE 0.5 MG: 1 INJECTION, SOLUTION INTRAMUSCULAR; INTRAVENOUS; SUBCUTANEOUS at 08:06

## 2024-10-02 RX ADMIN — SODIUM CHLORIDE, POTASSIUM CHLORIDE, SODIUM LACTATE AND CALCIUM CHLORIDE: 600; 310; 30; 20 INJECTION, SOLUTION INTRAVENOUS at 13:40

## 2024-10-02 RX ADMIN — SODIUM CHLORIDE, PRESERVATIVE FREE 10 ML: 5 INJECTION INTRAVENOUS at 08:07

## 2024-10-02 RX ADMIN — SODIUM CHLORIDE, POTASSIUM CHLORIDE, SODIUM LACTATE AND CALCIUM CHLORIDE: 600; 310; 30; 20 INJECTION, SOLUTION INTRAVENOUS at 12:15

## 2024-10-02 RX ADMIN — KETOROLAC TROMETHAMINE 30 MG: 30 INJECTION, SOLUTION INTRAMUSCULAR at 18:53

## 2024-10-02 RX ADMIN — DEXAMETHASONE SODIUM PHOSPHATE 10 MG: 10 INJECTION INTRAMUSCULAR; INTRAVENOUS at 11:52

## 2024-10-02 RX ADMIN — PROPOFOL 200 MG: 10 INJECTION, EMULSION INTRAVENOUS at 11:48

## 2024-10-02 RX ADMIN — HYDROMORPHONE HYDROCHLORIDE 0.5 MG: 2 INJECTION, SOLUTION INTRAMUSCULAR; INTRAVENOUS; SUBCUTANEOUS at 13:49

## 2024-10-02 RX ADMIN — HYDROMORPHONE HYDROCHLORIDE 0.5 MG: 2 INJECTION, SOLUTION INTRAMUSCULAR; INTRAVENOUS; SUBCUTANEOUS at 13:31

## 2024-10-02 ASSESSMENT — PAIN - FUNCTIONAL ASSESSMENT
PAIN_FUNCTIONAL_ASSESSMENT: ACTIVITIES ARE NOT PREVENTED
PAIN_FUNCTIONAL_ASSESSMENT: ACTIVITIES ARE NOT PREVENTED
PAIN_FUNCTIONAL_ASSESSMENT: PREVENTS OR INTERFERES SOME ACTIVE ACTIVITIES AND ADLS
PAIN_FUNCTIONAL_ASSESSMENT: ACTIVITIES ARE NOT PREVENTED
PAIN_FUNCTIONAL_ASSESSMENT: ACTIVITIES ARE NOT PREVENTED
PAIN_FUNCTIONAL_ASSESSMENT: PREVENTS OR INTERFERES SOME ACTIVE ACTIVITIES AND ADLS

## 2024-10-02 ASSESSMENT — PAIN DESCRIPTION - DESCRIPTORS
DESCRIPTORS: SHARP
DESCRIPTORS: ACHING;STABBING
DESCRIPTORS: SHARP
DESCRIPTORS: DISCOMFORT;SORE

## 2024-10-02 ASSESSMENT — PAIN DESCRIPTION - ORIENTATION
ORIENTATION: LEFT
ORIENTATION: ANTERIOR
ORIENTATION: MID
ORIENTATION: MID
ORIENTATION: LEFT
ORIENTATION: MID

## 2024-10-02 ASSESSMENT — PAIN SCALES - GENERAL
PAINLEVEL_OUTOF10: 8
PAINLEVEL_OUTOF10: 8
PAINLEVEL_OUTOF10: 10
PAINLEVEL_OUTOF10: 8
PAINLEVEL_OUTOF10: 10
PAINLEVEL_OUTOF10: 9
PAINLEVEL_OUTOF10: 10
PAINLEVEL_OUTOF10: 2
PAINLEVEL_OUTOF10: 8
PAINLEVEL_OUTOF10: 9
PAINLEVEL_OUTOF10: 10
PAINLEVEL_OUTOF10: 8
PAINLEVEL_OUTOF10: 10

## 2024-10-02 ASSESSMENT — PAIN DESCRIPTION - LOCATION
LOCATION: ABDOMEN

## 2024-10-02 ASSESSMENT — PAIN DESCRIPTION - FREQUENCY
FREQUENCY: CONTINUOUS
FREQUENCY: CONTINUOUS

## 2024-10-02 ASSESSMENT — PAIN DESCRIPTION - PAIN TYPE
TYPE: ACUTE PAIN
TYPE: ACUTE PAIN

## 2024-10-02 NOTE — CARE COORDINATION
10/02/24 0834   Readmission Assessment   Number of Days since last admission? 1-7 days   Previous Disposition Home Alone   Who is being Interviewed Patient   What was the patient's/caregiver's perception as to why they think they needed to return back to the hospital? Other (Comment)  (PAIN)   Did you visit your Primary Care Physician after you left the hospital, before you returned this time? No   Why weren't you able to visit your PCP? Did not have an appointment   Did you see a specialist, such as Cardiac, Pulmonary, Orthopedic Physician, etc. after you left the hospital? No   Who advised the patient to return to the hospital? Self-referral   Does the patient report anything that got in the way of taking their medications? No   In our efforts to provide the best possible care to you and others like you, can you think of anything that we could have done to help you after you left the hospital the first time, so that you might not have needed to return so soon? Other (Comment)  (Nothing, pain got worse)

## 2024-10-02 NOTE — PROGRESS NOTES
Pt arrived to PCU room 1004 from ED. Pt ambulatory at the time. Telemetry initiated, vitals obtained, signed and held orders released. Care ongoing.

## 2024-10-02 NOTE — CONSULTS
today; Will follow closely; D/C PF1%    Closed fracture of right distal fibula 12/21/2020    Foraminal stenosis of lumbar region 09/02/2020    Heartburn     Heroin abuse (HCC)     Quit - none in years.    Iritis     Sacroiliitis (HCC) 12/19/2013    Spondylolisthesis, acquired 09/02/2020       Past Surgical History:        Procedure Laterality Date    BACK SURGERY Bilateral 09/02/2020    L4-S1 TLIF BILATERAL DECOMPRESSION L5-S1 RIGHT DECOMPRESSION L4-5 NUVASIVE 2 C-ARMS CELLSAVER - Bilateral    CARDIAC VALVE SURGERY      as an infant    LUMBAR SPINE SURGERY Bilateral 9/2/2020    L4-S1 TLIF   BILATERAL DECOMPRESSION   L5-S1       RIGHT DECOMPRESSION L4-5   NUVASIVE    2 C-ARMS    CELLSAVER performed by Raad Salinas MD at [facility] OR    TONSILLECTOMY         Medications Prior to Admission:   Medications Prior to Admission: thiamine (B-1) 100 MG/ML injection, Infuse 1 mL intravenously daily  ciprofloxacin (CIPRO) 500 MG tablet, Take 1 tablet by mouth 2 times daily for 12 days  metroNIDAZOLE (FLAGYL) 500 MG tablet, Take 1 tablet by mouth 3 times daily for 12 days  ibuprofen (ADVIL;MOTRIN) 600 MG tablet, Take 1 tablet by mouth 3 times daily as needed for Pain  omeprazole (PRILOSEC) 20 MG delayed release capsule, Take 1 capsule by mouth daily  lisinopril (PRINIVIL;ZESTRIL) 20 MG tablet, Take 1 tablet by mouth daily    Allergies:  Adalimumab, Penicillin g, Penicillins, and Zolpidem    Social History:   Social History     Socioeconomic History    Marital status:      Spouse name: Not on file    Number of children: Not on file    Years of education: Not on file    Highest education level: GED or equivalent   Occupational History    Not on file   Tobacco Use    Smoking status: Every Day     Current packs/day: 1.50     Average packs/day: 1.5 packs/day for 22.8 years (34.1 ttl pk-yrs)     Types: Cigarettes     Start date: 2002    Smokeless tobacco: Never   Vaping Use    Vaping status: Never Used   Substance and Sexual  absent. Peritoneum/Retroperitoneum: Inflammatory stranding is identified in the left hemiabdomen.  Small to moderate free fluid is identified in the abdomen and pelvis.  Multiple loculated fluid collections are also seen compatible with early abscesses measuring up to 4.7 x 1.6 cm, and 6.5 x 1.4 cm.  Small loculated collection containing extraluminal gas is also seen in the left lower quadrant measuring 3.6 x 2.0 cm. Aorta and its branches are patent.  Mild atherosclerosis. Prominent lymph nodes are seen in the mesentery, likely reactive. Bones/Soft Tissues: No acute or aggressive osseous lesion.  Posterior fusion hardware is identified at L4 through S1.  No significant change since September 27, 2024.     1. Significant wall thickening of a loop of small bowel in the left mid abdomen with associated pneumatosis.  New extraluminal gas is also seen adjacent to the sigmoid colon in the left lower quadrant.  Both free fluid and loculated fluid collection suggestive of early abscess are identified as described above.  Recommend surgical consultation to assess for bowel ischemia and/or injury. 2. Mild atherosclerosis given the patient's age. 3. Small bilateral pleural effusions with left base opacity which may represent atelectasis or infection.         ASSESSMENT   Principal Problem:    Intra-abdominal free air of unknown etiology  Resolved Problems:    * No resolved hospital problems. *  39-year-old male with history of ankylosing spondylitis and rheumatoid arthritis has not had methotrexate for last 6 to 7 months presented to ED with acute onset diverticulitis with worsening of symptoms in the last 7 days.  On examination patient is tender in the whole abdomen left more than right, guarding even in the right upper quadrant.  CT shows diverticulitis of the sigmoid colon along with possible pneumoperitoneum and pneumatosis.    PLAN  As discussed with Dr. Patel  Patient has developed peritonitis at this time.  We will

## 2024-10-02 NOTE — ANESTHESIA PRE PROCEDURE
Bilateral 9/2/2020    L4-S1 TLIF   BILATERAL DECOMPRESSION   L5-S1       RIGHT DECOMPRESSION L4-5   NUVASIVE    2 C-ARMS    CELLSAVER performed by Raad Salinas MD at [facility] OR    TONSILLECTOMY         Social History:    Social History     Tobacco Use    Smoking status: Every Day     Current packs/day: 1.50     Average packs/day: 1.5 packs/day for 22.8 years (34.1 ttl pk-yrs)     Types: Cigarettes     Start date: 2002    Smokeless tobacco: Never   Substance Use Topics    Alcohol use: Yes     Alcohol/week: 47.0 standard drinks of alcohol     Types: 40 Cans of beer, 7 Shots of liquor per week     Comment: 3-4 shots and 5-6 beers per night                                Ready to quit: Not Answered  Counseling given: Not Answered      Vital Signs (Current):   Vitals:    10/02/24 0421 10/02/24 0530 10/02/24 0603 10/02/24 0747   BP:    (!) 154/91   Pulse:    (!) 113   Resp: 18  17 20   Temp:  98.8 °F (37.1 °C)  98.2 °F (36.8 °C)   TempSrc:  Oral  Oral   SpO2:    94%                                              BP Readings from Last 3 Encounters:   10/02/24 (!) 154/91   10/01/24 (!) 149/96   10/01/24 (!) 167/91       NPO Status:                                                                                 BMI:   Wt Readings from Last 3 Encounters:   10/01/24 73.9 kg (163 lb)   10/01/24 73.9 kg (163 lb)   07/17/24 71.7 kg (158 lb)     There is no height or weight on file to calculate BMI.    CBC:   Lab Results   Component Value Date/Time    WBC 20.7 10/02/2024 05:43 AM    RBC 4.34 10/02/2024 05:43 AM    HGB 13.9 10/02/2024 05:43 AM    HCT 39.7 10/02/2024 05:43 AM    MCV 91.5 10/02/2024 05:43 AM    RDW 12.8 10/02/2024 05:43 AM     10/02/2024 05:43 AM       CMP:   Lab Results   Component Value Date/Time     10/02/2024 05:43 AM    K 3.6 10/02/2024 05:43 AM    CL 98 10/02/2024 05:43 AM    CO2 24 10/02/2024 05:43 AM    BUN 2 10/02/2024 05:43 AM    CREATININE 0.4 10/02/2024 05:43 AM    GFRAA >60 09/03/2020 05:49 AM

## 2024-10-02 NOTE — PROGRESS NOTES
[facility]  Office: [phone]  Fady Flores, DO, Chris Medina, DO, Nick Yarbrough DO, Gary Wei, DO, Aileen Lloyd MD, Marie Astorga MD, Jacob Kelly MD, Zaira Estrella MD,  German Ramos MD, Vi Castillo MD, Sigifredo Schwartz MD,  Denice Streeter DO, Sebastián Suggs MD, Tye Boateng MD, Stan Flores DO, Nida Wilhelm MD,  Damir Herrera DO, Mallory Brasher MD, Rosana Escalante MD, Conchis Jiménez MD, Salazar Trinh MD,  Santos Rai MD, Jonna Delgado MD, Raymundo Ambrocio MD, Silver Fisher MD, Angel Dhaliwal MD, Michael Lewis MD, Maxwell Sifuentes, DO, Camron Salas DO, Dillon Brizuela DO, Delvin Leija MD, Shirley Waterhouse, CNP,  Kasie Early CNP, Maxwell Lugo, CNP,  Desiree Hannah, [facility], Stacie Moss, CNP, Rosangela Kennedy, CNP, Lauren Franco, CNP, Layla Fritz, CNP, Leandra Vega, PA-C, Ivory Cool, PA-C, Taina Cunningham, CNP, Denys Pond, CNP,  Anusha Romero, CNP, Twila Montgomery, CNP, Gretchen Rudolph, CNP, Maria De Jesus Sebastian, CNS, Michelle Thapa, CNP, Dulce Sky, CNP, Tracy Schwab, CNP       [facility]      Daily Progress Note     Admit Date: 10/1/2024  Bed/Room No.  1004/1004-02  Admitting Physician : Jacob Kelly MD  Code Status :Full Code  Hospital Day:  LOS: 1 day   Chief Complaint:     Chief Complaint   Patient presents with    Abdominal Pain     Pt was d/c from facility at 1300 today, returned back to facility 1500 as he began to feel worse again with his symptoms. Patient reports he is unable to eat. Denies having a BM. Denies urination complications.      Principal Problem:    Intra-abdominal free air of unknown etiology  Resolved Problems:    * No resolved hospital problems. *    Subjective :        Interval History/Significant events :  10/02/24    Patient continues to c/o severe abd apin. He denies any nausea and committing. He has been leaving floor to smoke. He had medications 1 1/2 hrs ago.   Vitals - Temp:  afebrile ,  Heart Rate -  Normal Cardiac Rhythm - regular rate and rhythm Blood Pressure mostly controlled  Labs / test results -      Nursing notes , Consults notes reviewed. Overnight events and updates discussed with Nursing staff .   Background History:         Abdifatah De Anda is 39 y.o. male  Who was admitted to the hospital on 10/1/2024 for treatment of Intra-abdominal free air of unknown etiology.   Patient came to ED with abdominal pain. Patient was admitted in hospital 1 day before with acute diverticulitis and discharged on oral antibiotics. He returned back with peritoneal signs and worsened abdominal pain. CTabd was repeated and showed worsened inflammation and stranding in sigmoid colon with wall thickening of jejunum with extraluminal gas concerning for perforation.     PMH:  Past Medical History:   Diagnosis Date    Alcohol abuse 05/23/2024    Ankylosing spondylitis ([facility]) 05/27/2016    Anxiety     Arthritis, rheumatoid ([facility]) 2014    Benign essential hypertension 07/17/2024    Chronic iritis of both eyes 05/18/2016    Last Assessment & Plan:   Formatting of this note might be different from the original.  Quiet today; Will follow closely; D/C PF1%  Formatting of this note might be different from the original.  Last Assessment & Plan:   Formatting of this note might be different from the original.  Quiet today; Will follow closely; D/C PF1%    Closed fracture of right distal fibula 12/21/2020    Foraminal stenosis of lumbar region 09/02/2020    Heartburn     Heroin abuse ([facility])     Quit - none in years.    Iritis     Sacroiliitis ([facility]) 12/19/2013    Spondylolisthesis, acquired 09/02/2020      Allergies:   Allergies   Allergen Reactions    Adalimumab Other (See Comments)     Eye problems X2 (feels like there is something in the eyes)    Penicillin G Rash    Penicillins Hives    Zolpidem Rash      Medications :  sodium chloride flush, 5-40 mL, IntraVENous, 2 times per day  famotidine, 20 mg, Oral, BID  enoxaparin, 40 mg,

## 2024-10-02 NOTE — ANESTHESIA POSTPROCEDURE EVALUATION
Department of Anesthesiology  Postprocedure Note    Patient: Abdifatah De Anda  MRN: [id number]  YOB: 1985  Date of evaluation: 10/2/2024    Procedure Summary       Date: 10/02/24 Room / Location: [address]    Anesthesia Start: 1141 Anesthesia Stop: 1356    Procedure: LAPAROTOMY EXPLORATORY DESCENDING COLOSTOMY, SIGMOID RESECTION (Abdomen) Diagnosis:       Peritoneal free air      (Peritoneal free air [K66.8])    Surgeons: Thien Patel MD Responsible Provider: Jatin Duff DO    Anesthesia Type: general ASA Status: 3            Anesthesia Type: No value filed.    Evelyn Phase I: Evelyn Score: 8    Evelyn Phase II:      Anesthesia Post Evaluation    Patient location during evaluation: PACU  Patient participation: complete - patient participated  Level of consciousness: awake and alert  Airway patency: patent  Nausea & Vomiting: no nausea and no vomiting  Cardiovascular status: hemodynamically stable  Respiratory status: acceptable  Hydration status: stable  Pain management: adequate    No notable events documented.

## 2024-10-02 NOTE — PROGRESS NOTES
Patient taken off unit for surgery. Report given to PACU via telephone. Belongings placed in lockup. Patient taken in bed.

## 2024-10-02 NOTE — OP NOTE
Operative Note      Patient: Abdifatah De Anda  YOB: 1985  MRN: [id number]    Date of Procedure: 10/2/2024    Pre-Op Diagnosis Codes:      * Peritoneal free air [K66.8]    Post-Op Diagnosis:  Perforated diverticulitis, pelvic abscess, peritonitis       Procedure(s):  LAPAROTOMY EXPLORATORY DESCENDING COLOSTOMY, SIGMOID RESECTION, drainage of pelvic abscess, tap block    Surgeon(s):  Thien Patel MD Kim, Tricia M, DO    Assistant:   First Assistant: Jr Carlson RN  Resident: Donny Kennedy MD    Anesthesia: General    Estimated Blood Loss (mL): less than 50     Complications: None    Specimens:   ID Type Source Tests Collected by Time Destination   1 : ABDOMINAL ABSCESS Swab Abdomen CULTURE, ANAEROBIC AND AEROBIC Thien Patel MD 10/2/2024 1220    A : SIGMOID COLON Tissue Sigmoid Colon SURGICAL PATHOLOGY Thien Patel MD 10/2/2024 1240        Implants:  * No implants in log *      Drains:   Closed/Suction Drain Right RLQ Bulb (Active)   Site Description Clean, dry & intact 10/02/24 1312       NG/OG/NJ/NE Tube Orogastric 18 fr Right mouth (Active)       Urinary Catheter 10/02/24 Devine (Active)       Findings:  Infection Present At Time Of Surgery (PATOS) (choose all levels that have infection present):  - Organ Space infection (below fascia) present as evidenced by pus  Other Findings: Perforated diverticulitis.  Significant inflammatory process involving the abdominal cavity with small bowel adhered to the inflammatory process in the sigmoid colon.  Fibrinous deposits on the small bowel noted even in the upper abdomen.    Detailed Description of Procedure:   Patient was brought to the operating room and was placed in the supine position.  After induction of general endotracheal anesthesia, patient's abdomen was prepped and draped in usual sterile fashion.  A midline incision was made starting from the lower mid abdomen to mid epigastrium.  Incision was taken to the fascia.  Significant edema  The colostomy site was created in the left side of the abdomen and the descending colon was brought through it without difficulty.  At this time, all gowns and gloves were changed.  A Rodney-Gee drain was placed in the pelvis for postoperative drainage.  Further irrigation of the abdominal cavity was next undertaken.  Irrisept was also used to irrigate the abdominal cavity.  #1 Prolene was next used in running fashion to close the fascia.  Please note that before the closure of the fascia tap block with Marcaine with epinephrine was performed with 15 mL on either side of the abdominal wall.  Fascia was closed by using #1 Prolene with a running fashion with few interrupted Prolene sutures.  Subcutaneous tissue was irrigated and staples were placed loosely with iodoform packing in the middle.  Midline incision was covered.  Stoma was next matured by using 3-0 Monocryl in interrupted manner.  Stoma appeared to be pink.  Stoma appliance was applied.  Midline incision was covered.  Patient was brought recovery room in stable condition.    Electronically signed by Thien Patel MD on 10/2/2024 at 1:45 PM

## 2024-10-02 NOTE — PLAN OF CARE
Problem: Discharge Planning  Goal: Discharge to home or other facility with appropriate resources  Outcome: Progressing  Note: Patient remains free from falls this shift. Patient had surgery today, returned to room in stable condition. Pain meds adjusted, patient receiving IV fluids and abx, karmen drain, midline dressing, colostomy, and javed noted. Mag 1.5 today, replaced, K 3.2, replaced. VS stable. EPC cuffs on patient. Patient anxious regarding discharge planning, and colostomy maintenance. Wound nurse to eval patient tomorrow. Discharge planning pending, safety measures continued.      Problem: Safety - Adult  Goal: Free from fall injury  Outcome: Progressing     Problem: Pain  Goal: Verbalizes/displays adequate comfort level or baseline comfort level  Outcome: Progressing     Problem: Respiratory - Adult  Goal: Achieves optimal ventilation and oxygenation  Outcome: Progressing     Problem: Cardiovascular - Adult  Goal: Maintains optimal cardiac output and hemodynamic stability  Outcome: Progressing     Problem: Gastrointestinal - Adult  Goal: Minimal or absence of nausea and vomiting  Outcome: Progressing

## 2024-10-02 NOTE — PROGRESS NOTES
Patient returned to room 1004 from PACU. Patient had midline incision with dry dressing, GALDINO drain, colostomy, and javed. VS taken. Patient complaining of 10/10 abdominal pain, see MAR for scheduled and PRN medication admin times. Patient alert and oriented.

## 2024-10-02 NOTE — CARE COORDINATION
Case Management Assessment  Initial Evaluation    Date/Time of Evaluation: 10/2/2024 8:39 AM  Assessment Completed by: Naz Murrieta    If patient is discharged prior to next notation, then this note serves as note for discharge by case management.    Patient Name: Abdifatah De Anda                   YOB: 1985  Diagnosis: Septicemia (HCC) [A41.9]  Intra-abdominal abscess (HCC) [K65.1]  Intra-abdominal free air of unknown etiology [K66.8]                   Date / Time: 10/1/2024  9:14 PM    Patient Admission Status: Inpatient   Readmission Risk (Low < 19, Mod (19-27), High > 27): Readmission Risk Score: 17    Current PCP: Brennon Reyes, DO  PCP verified by CM? (P) Yes    Chart Reviewed: Yes      History Provided by: (P) Patient  Patient Orientation: (P) Alert and Oriented    Patient Cognition: (P) Alert    Hospitalization in the last 30 days (Readmission):  Yes    If yes, Readmission Assessment in CM Navigator will be completed.    Advance Directives:      Code Status: Full Code   Patient's Primary Decision Maker is: (P) Legal Next of Kin      Discharge Planning:    Patient lives with: (P) Alone Type of Home: (P) Apartment  Primary Care Giver: (P) Self  Patient Support Systems include: (P) Family Members, Friends/Neighbors   Current Financial resources: (P) Other (Comment) (medical mutual)  Current community resources: (P) [employer]  Current services prior to admission: (P) None            Current DME:              Type of Home Care services:  None    ADLS  Prior functional level: (P) Independent in ADLs/IADLs  Current functional level: (P) Independent in ADLs/IADLs    PT AM-PAC:   /24  OT AM-PAC:   /24    Family can provide assistance at DC: (P) No  Would you like Case Management to discuss the discharge plan with any other family members/significant others, and if so, who? (P) No  Plans to Return to Present Housing: (P) Yes  Other Identified Issues/Barriers to RETURNING to current housing:

## 2024-10-02 NOTE — ED PROVIDER NOTES
EMERGENCY DEPARTMENT ENCOUNTER    Pt Name: Abdifatah De Anda  MRN: [id number]  Birthdate 1985  Date of evaluation: 10/1/24  CHIEF COMPLAINT       Chief Complaint   Patient presents with    Abdominal Pain     Pt was d/c from facility at 1300 today, returned back to facility 1500 as he began to feel worse again with his symptoms. Patient reports he is unable to eat. Denies having a BM. Denies urination complications.      HISTORY OF PRESENT ILLNESS   This is a 39-year-old male that presents with abdominal pain.  Patient has recent diagnosis of diverticulitis with severe abdominal pain.  He states that he was discharged and continued to have pain and discomfort today and came in for further evaluation.  The patient denies any fevers, he describes severe lower abdominal pain associate with nausea and not feeling well.  Not able to tolerate oral intake.           REVIEW OF SYSTEMS     Review of Systems  PASTMEDICAL HISTORY     Past Medical History:   Diagnosis Date    Alcohol abuse 05/23/2024    Ankylosing spondylitis ([facility]) 05/27/2016    Anxiety     Arthritis, rheumatoid ([facility]) 2014    Benign essential hypertension 07/17/2024    Chronic iritis of both eyes 05/18/2016    Last Assessment & Plan:   Formatting of this note might be different from the original.  Quiet today; Will follow closely; D/C PF1%  Formatting of this note might be different from the original.  Last Assessment & Plan:   Formatting of this note might be different from the original.  Quiet today; Will follow closely; D/C PF1%    Closed fracture of right distal fibula 12/21/2020    Foraminal stenosis of lumbar region 09/02/2020    Heartburn     Heroin abuse ([facility])     Quit - none in years.    Iritis     Sacroiliitis ([facility]) 12/19/2013    Spondylolisthesis, acquired 09/02/2020     Past Problem List  Patient Active Problem List   Diagnosis Code    Spondylolisthesis, acquired M43.10    Ankylosing spondylitis ([facility]) M45.9    Chronic iritis of both eyes H20.13  repeat CT scan to evaluate for possible abscess.    11:49 PM EDT  Patient CT scan shows multiple abscesses, I discussed the case with the on-call general surgeon who recommended admission with IV antibiotics and n.p.o. at midnight.  Patient was discussed with the hospitalist service who agrees with admission.    CRITICAL CARE:       PROCEDURES:  Procedures         DATA FOR LAB AND RADIOLOGY TESTS ORDERED BELOW ARE REVIEWED BY THE ED CLINICIAN:    RADIOLOGY: All x-rays, CT, MRI, and formal ultrasound images (except ED bedside ultrasound) are read by the radiologist, see reports below, unless otherwise noted in MDM or here.  Reports below are reviewed by myself.  CT ABDOMEN PELVIS W IV CONTRAST Additional Contrast? None   Preliminary Result   1. Significant wall thickening of a loop of small bowel in the left mid   abdomen with associated pneumatosis.  New extraluminal gas is also seen   adjacent to the sigmoid colon in the left lower quadrant.  Both free fluid   and loculated fluid collection suggestive of early abscess are identified as   described above.  Recommend surgical consultation to assess for bowel   ischemia and/or injury.   2. Mild atherosclerosis given the patient's age.   3. Small bilateral pleural effusions with left base opacity which may   represent atelectasis or infection.             LABS: Lab orders shown below, the results are reviewed by myself, and all abnormals are listed below.  Labs Reviewed   BASIC METABOLIC PANEL - Abnormal; Notable for the following components:       Result Value    Potassium 3.2 (*)     Glucose 119 (*)     BUN <2 (*)     Creatinine 0.4 (*)     Calcium 8.4 (*)     All other components within normal limits   CBC WITH AUTO DIFFERENTIAL - Abnormal; Notable for the following components:    WBC 14.2 (*)     Hematocrit 39.8 (*)     MCHC 35.4 (*)     All other components within normal limits   HEPATIC FUNCTION PANEL - Abnormal; Notable for the following components:    Albumin

## 2024-10-03 LAB
ANION GAP SERPL CALCULATED.3IONS-SCNC: 5 MMOL/L (ref 9–17)
BASOPHILS # BLD: 0 K/UL (ref 0–0.2)
BASOPHILS NFR BLD: 0 % (ref 0–2)
BUN SERPL-MCNC: 3 MG/DL (ref 6–20)
BUN/CREAT SERPL: 6 (ref 9–20)
CA-I BLD-SCNC: 1.2 MMOL/L (ref 1.13–1.33)
CALCIUM SERPL-MCNC: 8 MG/DL (ref 8.6–10.4)
CHLORIDE SERPL-SCNC: 102 MMOL/L (ref 98–107)
CO2 SERPL-SCNC: 29 MMOL/L (ref 20–31)
CREAT SERPL-MCNC: 0.5 MG/DL (ref 0.7–1.2)
EOSINOPHIL # BLD: 0 K/UL (ref 0–0.44)
EOSINOPHILS RELATIVE PERCENT: 0 % (ref 1–4)
ERYTHROCYTE [DISTWIDTH] IN BLOOD BY AUTOMATED COUNT: 13.2 % (ref 11.8–14.4)
GFR, ESTIMATED: >90 ML/MIN/1.73M2
GLUCOSE BLD-MCNC: 111 MG/DL (ref 75–110)
GLUCOSE BLD-MCNC: 130 MG/DL (ref 75–110)
GLUCOSE BLD-MCNC: 131 MG/DL (ref 75–110)
GLUCOSE BLD-MCNC: 142 MG/DL (ref 75–110)
GLUCOSE BLD-MCNC: 150 MG/DL (ref 75–110)
GLUCOSE BLD-MCNC: 163 MG/DL (ref 75–110)
GLUCOSE BLD-MCNC: 189 MG/DL (ref 75–110)
GLUCOSE SERPL-MCNC: 162 MG/DL (ref 70–99)
HCT VFR BLD AUTO: 36.5 % (ref 40.7–50.3)
HGB BLD-MCNC: 12.6 G/DL (ref 13–17)
IMM GRANULOCYTES # BLD AUTO: 0.24 K/UL (ref 0–0.3)
IMM GRANULOCYTES NFR BLD: 1 %
LYMPHOCYTES NFR BLD: 0.72 K/UL (ref 1.1–3.7)
LYMPHOCYTES RELATIVE PERCENT: 3 % (ref 24–43)
MAGNESIUM SERPL-MCNC: 2 MG/DL (ref 1.6–2.6)
MCH RBC QN AUTO: 32.2 PG (ref 25.2–33.5)
MCHC RBC AUTO-ENTMCNC: 34.5 G/DL (ref 28.4–34.8)
MCV RBC AUTO: 93.4 FL (ref 82.6–102.9)
MICROORGANISM SPEC CULT: NORMAL
MICROORGANISM SPEC CULT: NORMAL
MONOCYTES NFR BLD: 1.45 K/UL (ref 0.1–1.2)
MONOCYTES NFR BLD: 6 % (ref 3–12)
NEUTROPHILS NFR BLD: 90 % (ref 36–65)
NEUTS SEG NFR BLD: 21.69 K/UL (ref 1.5–8.1)
NRBC BLD-RTO: 0 PER 100 WBC
PLATELET # BLD AUTO: 316 K/UL (ref 138–453)
PMV BLD AUTO: 9.9 FL (ref 8.1–13.5)
POTASSIUM SERPL-SCNC: 4.1 MMOL/L (ref 3.7–5.3)
RBC # BLD AUTO: 3.91 M/UL (ref 4.21–5.77)
SERVICE CMNT-IMP: NORMAL
SERVICE CMNT-IMP: NORMAL
SODIUM SERPL-SCNC: 136 MMOL/L (ref 135–144)
SPECIMEN DESCRIPTION: NORMAL
SPECIMEN DESCRIPTION: NORMAL
WBC OTHER # BLD: 24.1 K/UL (ref 3.5–11.3)

## 2024-10-03 PROCEDURE — 2500000003 HC RX 250 WO HCPCS

## 2024-10-03 PROCEDURE — 1200000000 HC SEMI PRIVATE

## 2024-10-03 PROCEDURE — 85025 COMPLETE CBC W/AUTO DIFF WBC: CPT

## 2024-10-03 PROCEDURE — 83735 ASSAY OF MAGNESIUM: CPT

## 2024-10-03 PROCEDURE — 80048 BASIC METABOLIC PNL TOTAL CA: CPT

## 2024-10-03 PROCEDURE — 82947 ASSAY GLUCOSE BLOOD QUANT: CPT

## 2024-10-03 PROCEDURE — 36415 COLL VENOUS BLD VENIPUNCTURE: CPT

## 2024-10-03 PROCEDURE — 2580000003 HC RX 258: Performed by: SURGERY

## 2024-10-03 PROCEDURE — 99232 SBSQ HOSP IP/OBS MODERATE 35: CPT | Performed by: FAMILY MEDICINE

## 2024-10-03 PROCEDURE — 6360000002 HC RX W HCPCS

## 2024-10-03 PROCEDURE — 6360000002 HC RX W HCPCS: Performed by: SURGERY

## 2024-10-03 PROCEDURE — 6360000002 HC RX W HCPCS: Performed by: FAMILY MEDICINE

## 2024-10-03 PROCEDURE — 2500000003 HC RX 250 WO HCPCS: Performed by: NURSE PRACTITIONER

## 2024-10-03 PROCEDURE — 82330 ASSAY OF CALCIUM: CPT

## 2024-10-03 PROCEDURE — 6370000000 HC RX 637 (ALT 250 FOR IP)

## 2024-10-03 PROCEDURE — 6370000000 HC RX 637 (ALT 250 FOR IP): Performed by: SURGERY

## 2024-10-03 PROCEDURE — 6360000002 HC RX W HCPCS: Performed by: NURSE PRACTITIONER

## 2024-10-03 RX ORDER — LORAZEPAM 2 MG/ML
1 INJECTION INTRAMUSCULAR ONCE
Status: COMPLETED | OUTPATIENT
Start: 2024-10-03 | End: 2024-10-03

## 2024-10-03 RX ORDER — GUAIFENESIN 200 MG/10ML
200 LIQUID ORAL EVERY 4 HOURS PRN
Status: DISCONTINUED | OUTPATIENT
Start: 2024-10-03 | End: 2024-10-10 | Stop reason: HOSPADM

## 2024-10-03 RX ORDER — LORAZEPAM 2 MG/ML
0.5 INJECTION INTRAMUSCULAR EVERY 6 HOURS PRN
Status: DISCONTINUED | OUTPATIENT
Start: 2024-10-03 | End: 2024-10-10 | Stop reason: HOSPADM

## 2024-10-03 RX ORDER — ENOXAPARIN SODIUM 100 MG/ML
30 INJECTION SUBCUTANEOUS 2 TIMES DAILY
Status: DISCONTINUED | OUTPATIENT
Start: 2024-10-03 | End: 2024-10-03

## 2024-10-03 RX ORDER — OXYCODONE HYDROCHLORIDE 5 MG/1
5 TABLET ORAL EVERY 6 HOURS PRN
Status: DISCONTINUED | OUTPATIENT
Start: 2024-10-03 | End: 2024-10-05

## 2024-10-03 RX ORDER — LORAZEPAM 2 MG/ML
1 INJECTION INTRAMUSCULAR EVERY 6 HOURS PRN
Status: DISCONTINUED | OUTPATIENT
Start: 2024-10-03 | End: 2024-10-10 | Stop reason: HOSPADM

## 2024-10-03 RX ORDER — GABAPENTIN 300 MG/1
300 CAPSULE ORAL EVERY 8 HOURS
Status: DISCONTINUED | OUTPATIENT
Start: 2024-10-03 | End: 2024-10-10 | Stop reason: HOSPADM

## 2024-10-03 RX ORDER — METHOCARBAMOL 750 MG/1
750 TABLET, FILM COATED ORAL EVERY 6 HOURS
Status: DISCONTINUED | OUTPATIENT
Start: 2024-10-03 | End: 2024-10-07

## 2024-10-03 RX ORDER — ACETAMINOPHEN 500 MG
1000 TABLET ORAL EVERY 8 HOURS
Status: DISCONTINUED | OUTPATIENT
Start: 2024-10-03 | End: 2024-10-10 | Stop reason: HOSPADM

## 2024-10-03 RX ORDER — ENOXAPARIN SODIUM 100 MG/ML
40 INJECTION SUBCUTANEOUS DAILY
Status: DISCONTINUED | OUTPATIENT
Start: 2024-10-04 | End: 2024-10-10 | Stop reason: HOSPADM

## 2024-10-03 RX ADMIN — SODIUM CHLORIDE, PRESERVATIVE FREE 10 ML: 5 INJECTION INTRAVENOUS at 09:40

## 2024-10-03 RX ADMIN — LORAZEPAM 1 MG: 2 INJECTION INTRAMUSCULAR; INTRAVENOUS at 00:50

## 2024-10-03 RX ADMIN — SODIUM CHLORIDE: 9 INJECTION, SOLUTION INTRAVENOUS at 05:22

## 2024-10-03 RX ADMIN — POTASSIUM CHLORIDE, DEXTROSE MONOHYDRATE AND SODIUM CHLORIDE: 150; 5; 450 INJECTION, SOLUTION INTRAVENOUS at 02:41

## 2024-10-03 RX ADMIN — ACETAMINOPHEN 1000 MG: 500 TABLET ORAL at 06:44

## 2024-10-03 RX ADMIN — CIPROFLOXACIN 200 MG: 2 INJECTION, SOLUTION INTRAVENOUS at 17:10

## 2024-10-03 RX ADMIN — HYDROMORPHONE HYDROCHLORIDE 0.5 MG: 1 INJECTION, SOLUTION INTRAMUSCULAR; INTRAVENOUS; SUBCUTANEOUS at 18:15

## 2024-10-03 RX ADMIN — SODIUM CHLORIDE, PRESERVATIVE FREE 10 ML: 5 INJECTION INTRAVENOUS at 21:39

## 2024-10-03 RX ADMIN — DIPHENHYDRAMINE HYDROCHLORIDE 25 MG: 50 INJECTION INTRAMUSCULAR; INTRAVENOUS at 00:24

## 2024-10-03 RX ADMIN — GUAIFENESIN 200 MG: 200 SOLUTION ORAL at 21:34

## 2024-10-03 RX ADMIN — POTASSIUM CHLORIDE, DEXTROSE MONOHYDRATE AND SODIUM CHLORIDE: 150; 5; 450 INJECTION, SOLUTION INTRAVENOUS at 10:01

## 2024-10-03 RX ADMIN — POTASSIUM CHLORIDE, DEXTROSE MONOHYDRATE AND SODIUM CHLORIDE: 150; 5; 450 INJECTION, SOLUTION INTRAVENOUS at 17:17

## 2024-10-03 RX ADMIN — HYDROMORPHONE HYDROCHLORIDE 0.5 MG: 1 INJECTION, SOLUTION INTRAMUSCULAR; INTRAVENOUS; SUBCUTANEOUS at 14:19

## 2024-10-03 RX ADMIN — METRONIDAZOLE 500 MG: 500 INJECTION, SOLUTION INTRAVENOUS at 05:23

## 2024-10-03 RX ADMIN — KETOROLAC TROMETHAMINE 30 MG: 30 INJECTION, SOLUTION INTRAMUSCULAR at 00:24

## 2024-10-03 RX ADMIN — HYDROMORPHONE HYDROCHLORIDE 0.5 MG: 1 INJECTION, SOLUTION INTRAMUSCULAR; INTRAVENOUS; SUBCUTANEOUS at 21:33

## 2024-10-03 RX ADMIN — CIPROFLOXACIN 200 MG: 2 INJECTION, SOLUTION INTRAVENOUS at 05:40

## 2024-10-03 RX ADMIN — METHOCARBAMOL 750 MG: 750 TABLET ORAL at 06:44

## 2024-10-03 RX ADMIN — HYDROMORPHONE HYDROCHLORIDE 1 MG: 1 INJECTION, SOLUTION INTRAMUSCULAR; INTRAVENOUS; SUBCUTANEOUS at 03:44

## 2024-10-03 RX ADMIN — SODIUM CHLORIDE, PRESERVATIVE FREE 40 MG: 5 INJECTION INTRAVENOUS at 09:34

## 2024-10-03 RX ADMIN — OXYCODONE HYDROCHLORIDE 5 MG: 5 TABLET ORAL at 22:56

## 2024-10-03 RX ADMIN — GABAPENTIN 300 MG: 300 CAPSULE ORAL at 06:44

## 2024-10-03 RX ADMIN — ACETAMINOPHEN 1000 MG: 500 TABLET ORAL at 14:52

## 2024-10-03 RX ADMIN — CIPROFLOXACIN 200 MG: 2 INJECTION, SOLUTION INTRAVENOUS at 19:44

## 2024-10-03 RX ADMIN — METRONIDAZOLE 500 MG: 500 INJECTION, SOLUTION INTRAVENOUS at 21:39

## 2024-10-03 RX ADMIN — METHOCARBAMOL 750 MG: 750 TABLET ORAL at 13:09

## 2024-10-03 RX ADMIN — METRONIDAZOLE 500 MG: 500 INJECTION, SOLUTION INTRAVENOUS at 13:14

## 2024-10-03 RX ADMIN — HYDROMORPHONE HYDROCHLORIDE 0.5 MG: 1 INJECTION, SOLUTION INTRAMUSCULAR; INTRAVENOUS; SUBCUTANEOUS at 07:48

## 2024-10-03 RX ADMIN — ENOXAPARIN SODIUM 30 MG: 100 INJECTION SUBCUTANEOUS at 09:34

## 2024-10-03 RX ADMIN — CIPROFLOXACIN 200 MG: 2 INJECTION, SOLUTION INTRAVENOUS at 04:33

## 2024-10-03 RX ADMIN — HYDROMORPHONE HYDROCHLORIDE 0.5 MG: 1 INJECTION, SOLUTION INTRAMUSCULAR; INTRAVENOUS; SUBCUTANEOUS at 11:11

## 2024-10-03 RX ADMIN — KETOROLAC TROMETHAMINE 30 MG: 30 INJECTION, SOLUTION INTRAMUSCULAR at 05:45

## 2024-10-03 RX ADMIN — KETOROLAC TROMETHAMINE 30 MG: 30 INJECTION, SOLUTION INTRAMUSCULAR at 19:44

## 2024-10-03 RX ADMIN — GABAPENTIN 300 MG: 300 CAPSULE ORAL at 14:52

## 2024-10-03 RX ADMIN — KETOROLAC TROMETHAMINE 30 MG: 30 INJECTION, SOLUTION INTRAMUSCULAR at 13:10

## 2024-10-03 RX ADMIN — METHOCARBAMOL 750 MG: 750 TABLET ORAL at 19:44

## 2024-10-03 RX ADMIN — SODIUM CHLORIDE: 9 INJECTION, SOLUTION INTRAVENOUS at 21:38

## 2024-10-03 ASSESSMENT — PAIN SCALES - GENERAL
PAINLEVEL_OUTOF10: 9
PAINLEVEL_OUTOF10: 5
PAINLEVEL_OUTOF10: 5
PAINLEVEL_OUTOF10: 10
PAINLEVEL_OUTOF10: 4
PAINLEVEL_OUTOF10: 5
PAINLEVEL_OUTOF10: 5
PAINLEVEL_OUTOF10: 9
PAINLEVEL_OUTOF10: 3
PAINLEVEL_OUTOF10: 9
PAINLEVEL_OUTOF10: 3
PAINLEVEL_OUTOF10: 8
PAINLEVEL_OUTOF10: 3
PAINLEVEL_OUTOF10: 8
PAINLEVEL_OUTOF10: 0
PAINLEVEL_OUTOF10: 10
PAINLEVEL_OUTOF10: 10
PAINLEVEL_OUTOF10: 5
PAINLEVEL_OUTOF10: 2
PAINLEVEL_OUTOF10: 8
PAINLEVEL_OUTOF10: 8
PAINLEVEL_OUTOF10: 9
PAINLEVEL_OUTOF10: 10
PAINLEVEL_OUTOF10: 5
PAINLEVEL_OUTOF10: 3

## 2024-10-03 ASSESSMENT — PAIN DESCRIPTION - LOCATION
LOCATION: ABDOMEN

## 2024-10-03 ASSESSMENT — PAIN DESCRIPTION - ORIENTATION
ORIENTATION: LEFT;MID;RIGHT
ORIENTATION: RIGHT;LEFT
ORIENTATION: LEFT
ORIENTATION: LEFT
ORIENTATION: RIGHT;LEFT;MID
ORIENTATION: LEFT
ORIENTATION: LEFT;RIGHT
ORIENTATION: LEFT;RIGHT
ORIENTATION: LEFT
ORIENTATION: MID
ORIENTATION: LEFT;RIGHT;MID

## 2024-10-03 ASSESSMENT — PAIN - FUNCTIONAL ASSESSMENT
PAIN_FUNCTIONAL_ASSESSMENT: ACTIVITIES ARE NOT PREVENTED
PAIN_FUNCTIONAL_ASSESSMENT: PREVENTS OR INTERFERES SOME ACTIVE ACTIVITIES AND ADLS

## 2024-10-03 ASSESSMENT — PAIN DESCRIPTION - DESCRIPTORS
DESCRIPTORS: DISCOMFORT
DESCRIPTORS: ACHING;BURNING;GNAWING
DESCRIPTORS: ACHING;SHARP
DESCRIPTORS: ACHING;SHARP
DESCRIPTORS: ACHING
DESCRIPTORS: ACHING;BURNING;GNAWING
DESCRIPTORS: ACHING;BURNING;GNAWING
DESCRIPTORS: ACHING;BURNING
DESCRIPTORS: ACHING;BURNING
DESCRIPTORS: THROBBING;ACHING
DESCRIPTORS: SORE;THROBBING
DESCRIPTORS: ACHING;BURNING;GNAWING
DESCRIPTORS: ACHING;BURNING

## 2024-10-03 NOTE — PLAN OF CARE
Pt remains free of falls as well as acute events throughout the shift.   Pt alert and oriented X4, on room air.   Devine catheter removed, see MAR for output.   Patient has dextrose 5% and 0.45% NaCl with Kcl 20 mEq continuous at 120 mL/hr.  Pt remains NPO with sips with meds, Q4 blood sugar checks in place.   Pt receiving IV Dilaudid and IV Toradol for pain management.   Patient having pain on their abdomen and rates it a 10. Pain interventions includefrequent position changes, correct body alignment/body mechanics, relaxation techniques, and medication. Patients goal for pain relief is 3. The need for pain and symptom management will be considered in the discharge planning process to ensure patients comfort.   Pt received IV Flagyl and IV Cipro  Pt on Bowel Bundle day 1, form, CHG, and blood sugar checks completed.   Safety measures in place, call light within reach, all concerns addressed at this time.   Problem: Discharge Planning  Goal: Discharge to home or other facility with appropriate resources  10/3/2024 0956 by Braden Pan RN  Outcome: Progressing     Problem: Safety - Adult  Goal: Free from fall injury  10/3/2024 0956 by Braden Pan RN  Outcome: Progressing     Problem: Pain  Goal: Verbalizes/displays adequate comfort level or baseline comfort level  10/3/2024 0956 by Braden Pan RN  Outcome: Progressing     Problem: Respiratory - Adult  Goal: Achieves optimal ventilation and oxygenation  10/3/2024 0956 by Braden Pan RN  Outcome: Progressing     Problem: Cardiovascular - Adult  Goal: Maintains optimal cardiac output and hemodynamic stability  10/3/2024 0956 by Braden Pan RN  Outcome: Progressing     Problem: Gastrointestinal - Adult  Goal: Minimal or absence of nausea and vomiting  10/3/2024 0956 by Braden Pan RN  Outcome: Progressing     Problem: Gastrointestinal - Adult  Goal: Maintains or returns to baseline bowel function  10/3/2024 0956 by Braden Pan RN  Outcome:

## 2024-10-03 NOTE — PROGRESS NOTES
[facility]  Office: [phone]  Fady Flores, DO, Chris Medina, DO, Nick Yarbrough DO, Gary Wei, DO, Aileen Lloyd MD, Marie Astorga MD, Jacob Kelly MD, Zaira Estrella MD,  German Ramos MD, Vi Castillo MD, Sigifredo Schwartz MD,  Denice Streeter DO, Sebastián Suggs MD, Tye Boateng MD, Stan Flores DO, Nida Wilhelm MD,  Damir Herrera DO, Mallory Brasher MD, Rosana Escalante MD, Conchis Jiménez MD, Salazar Trinh MD,  Santos Rai MD, Jonna Delgado MD, Raymundo Ambrocio MD, Silver Fisher MD, Angel Dhaliwal MD, Michael Lewis MD, Maxwell Sifuentes, DO, Camron Salas DO, Dillon Brizuela DO, Delvin Leija MD, Shirley Waterhouse, CNP,  Kasie Early CNP, Maxwell Lugo, CNP,  Desiree Hannah, [facility], Stacie Moss, CNP, Rosangela Kennedy, CNP, Lauren Franco, CNP, Layla Fritz, CNP, Leandra Vega, PA-C, Ivory Cool, PA-C, Taina Cunningham, CNP, Denys Pond, CNP,  Anusha Romero, CNP, Twila Montgomery, CNP, Gretchen Rudolph, CNP, Maria De Jesus Sebastian, CNS, Michelle Thapa, CNP, Dulce Sky, CNP, Tracy Schwab, CNP       [facility]      Daily Progress Note     Admit Date: 10/1/2024  Bed/Room No.  1004/1004-02  Admitting Physician : Jacob Kelly MD  Code Status :Full Code  Hospital Day:  LOS: 2 days   Chief Complaint:     Chief Complaint   Patient presents with    Abdominal Pain     Pt was d/c from facility at 1300 today, returned back to facility 1500 as he began to feel worse again with his symptoms. Patient reports he is unable to eat. Denies having a BM. Denies urination complications.      Principal Problem:    Intra-abdominal free air of unknown etiology  Resolved Problems:    * No resolved hospital problems. *    Subjective :        Interval History/Significant events :  10/03/24    Patient  says that he is still having pain and would like pain medications adjusted. He is NPO. He is breathing OK. Denies any fever or chills. He has gas in colostomy bag . Devine in place .  atherosclerosis given the patient's age.   3. Small bilateral pleural effusions with left base opacity which may   represent atelectasis or infection.              Clinical Course : stable  Assessment and Plan  :        Acute Diverticulitis with perforation and peritonitis   IV antibiotics, Pain control , NPO , s/p Exploratory Laparotomy with sigmoid resection with  Colostomy creation on 10/3/24.   Alcohol Abuse - IV ativan as needed.    Rheumatoid arthritis / Ankylosing Spondylitis - not on medications.     Remove Devine   Encourage ambulation   Act as tolerated.     Continue to monitor vitals , Intake / output ,  Cell count , HGB , Kidney function, oxygenation  as indicated .   Plan and updates discussed with patient ,  answers  explained to satisfaction.   Plan discussed with Staff  Braden SMITH     (This note is created with the assistance of a speech recognition program. While intending to generate a document that actually reflects the content of the visit, the document can still have some errors including those of syntax and sound a like substitutions which may escape proof reading. In such instances, actual meaning can be extrapolated by contextual diversion.)      Jacob Kelly MD  10/3/2024

## 2024-10-03 NOTE — PLAN OF CARE
Patient is A&Ox4, in on room air, and is sinus tachy/rhythm on monitor.  Patient is bowel bundle day 1 today.  Patient has a javed, GALDINO drain, colostomy, and midline incision.   Patient has dextrose 5% and 0.45% NaCl with Kcl 20mEq continuous at 120mL/hr  Patient is a Q4 blood sugar check, pt is NPO.  Patient complaining of pain, dilaudid given PRN and Toradol scheduled. See MAR for administration.   Patient is on IV flagyl and Cipro  Patient requesting something to help sleep, 1x dose of benadryl given  Patient feeling anxious, 1x dose ativan given,   Safety maintained throughout shift, bed in lowest position, call light within reach, hourly rounding continued.    Patient having pain on their abdomen and rates it a 9. Pain interventions include medication. Patients goal for pain relief is 1. The need for pain and symptom management will be considered in the discharge planning process to ensure patients comfort.     Problem: Discharge Planning  Goal: Discharge to home or other facility with appropriate resources  10/3/2024 0304 by Ruba Szymanski RN  Outcome: Progressing     Problem: Safety - Adult  Goal: Free from fall injury  10/3/2024 0304 by Ruba Szymanski RN  Outcome: Progressing     Problem: Pain  Goal: Verbalizes/displays adequate comfort level or baseline comfort level  10/3/2024 0304 by Ruba Szymanski RN  Outcome: Progressing     Problem: Respiratory - Adult  Goal: Achieves optimal ventilation and oxygenation  10/3/2024 0304 by Ruba Szymanski RN  Outcome: Progressing     Problem: Cardiovascular - Adult  Goal: Maintains optimal cardiac output and hemodynamic stability  10/3/2024 0304 by Ruba Szymanski RN  Outcome: Progressing     Problem: Gastrointestinal - Adult  Goal: Minimal or absence of nausea and vomiting  10/3/2024 0304 by Ruba Szymanski RN  Outcome: Progressing     Problem: Gastrointestinal - Adult  Goal: Maintains or returns to baseline bowel function  Outcome: Progressing

## 2024-10-03 NOTE — PROGRESS NOTES
Comprehensive Nutrition Assessment    Type and Reason for Visit:  Initial, Positive Nutrition Screen    Nutrition Recommendations/Plan:   Recommend advance to low fiber diet when medically appropriate.   RD to follow/monitor.      Malnutrition Assessment:  Malnutrition Status:  At risk for malnutrition (Comment) (10/03/24 1245)        Nutrition Assessment:    Patient is a 39 year old gentleman who was discharged on 10/1 and returned to ED with abdominal pain. Pt is POD 1 exp lap with colostomy, diverticulitis. Patient NPO, surgery following. History of alcohol use. Weight is stable at present, 163#, #. Labs, meds, PMH reviewed.    Nutrition Related Findings:    Skin intact, no edema noted. LBM 10/1. Wound Type: None       Current Nutrition Intake & Therapies:    Average Meal Intake: NPO  Average Supplements Intake: NPO  Diet NPO Exceptions are: Sips of Water with Meds    Anthropometric Measures:  Height:    Ideal Body Weight (IBW):   ( )       Current Body Weight: 73.9 kg (163 lb),   IBW.    Current BMI (kg/m2): 27.1                          BMI Categories: Overweight (BMI 25.0-29.9)    Estimated Daily Nutrient Needs:  Energy Requirements Based On: Kcal/kg     Energy (kcal/day): 2854-9412 (20-25)  Weight Used for Protein Requirements: Current  Protein (g/day):  (1-1.5)  Method Used for Fluid Requirements: 1 ml/kcal  Fluid (ml/day): 0956-1926 (20-25)    Nutrition Diagnosis:   Inadequate oral intake related to altered GI function as evidenced by NPO or clear liquid status due to medical condition    Nutrition Interventions:      Nutrition Education/Counseling: Education not indicated  Coordination of Nutrition Care: Continue to monitor while inpatient       Goals:     Goals: PO intake 50% or greater       Nutrition Monitoring and Evaluation:   Behavioral-Environmental Outcomes: None Identified  Food/Nutrient Intake Outcomes: Diet Advancement/Tolerance, Food and Nutrient Intake  Physical Signs/Symptoms

## 2024-10-03 NOTE — PROGRESS NOTES
[facility]  General Surgery  Consult Progress Note    Service date: 10/3/2024, 6:25 AM  Room : [facility] [place] 2[place]1004-02   Name: Abdifatah De Anda  MRN: [id number]    Length of stay: 2 day(s)    OR date: 10/2/2024  Procedure(s):  LAPAROTOMY EXPLORATORY DESCENDING COLOSTOMY, SIGMOID RESECTION, drainage of pelvic abscess, tap block  POD#1    Subjective   Interval update:   -Overall, patient is doing well  -No significant overnight issues noted.  -Abdominal pain well controlled  -Patient is currently NPO   -Patient reports no nausea/ vomiting, fever/chills, chest pain, shortness of breath, or other symptoms.         Objective   Physical exam: BP (!) 148/103   Pulse (!) 101   Temp 98.4 °F (36.9 °C) (Oral)   Resp 18   SpO2 97%     General: Patient is resting comfortably on exam, not in any distress and is pleasant and cooperative.   Neuro: alert and oriented x3   Abdomen: soft, non tender, non distended. Incision: Clean, dry, intact. No erythema noted. Stoma is little dusky and producing only bowelr sweat at present. GALDINO drain in place - 380 cc serous.    Chest: Non-labored, symmetrical respirations.   CVS: Pulse RRR       Date 10/03/24 0000 - 10/03/24 2359   Shift 9667-9101 6251-1040 9545-6698 24 Hour Total   INTAKE   Shift Total       OUTPUT   Urine 500   500   Drains 120   120   Shift Total 620   620   Weight (kg)             Medications:    Current Facility-Administered Medications   Medication Dose Route Frequency Provider Last Rate Last Admin    sodium chloride flush 0.9 % injection 5-40 mL  5-40 mL IntraVENous 2 times per day Thien Patel MD   10 mL at 10/02/24 2050    sodium chloride flush 0.9 % injection 10 mL  10 mL IntraVENous PRN Thien Patel MD        0.9 % sodium chloride infusion   IntraVENous PRN Thien Patel MD 5 mL/hr at 10/03/24 0522 New Bag at 10/03/24 0522    magnesium sulfate 1000 mg in dextrose 5% 100 mL IVPB  1,000 mg IntraVENous PRN Thien Patel MD   Stopped at 10/02/24 1719    ondansetron

## 2024-10-03 NOTE — PROGRESS NOTES
Spiritual Health History and Assessment/Progress Note  [facility]    (P) Initial Encounter,  ,  ,      Name: Abdifatah De Anda MRN: [id number]    Age: 39 y.o.     Sex: male   Language: English   Tenriism: None   Intra-abdominal free air of unknown etiology     Date: 10/3/2024            Total Time Calculated: (P) 16 min              Spiritual Assessment began in [facility]        Referral/Consult From: (P) Rounding   Encounter Overview/Reason: (P) Initial Encounter  Service Provided For: (P) Patient    Stella, Belief, Meaning:   Patient identifies as spiritual  Family/Friends No family/friends present      Importance and Influence:  Patient has no beliefs influential to healthcare decision-making identified during this visit  Family/Friends No family/friends present    Community:  Patient feels well-supported. Support system includes: Friends  Family/Friends No family/friends present    Assessment and Plan of Care:     Patient Interventions include: Facilitated expression of thoughts and feelings and Facilitated life review and/ or legacy  Family/Friends Interventions include: No family/friends present    Patient Plan of Care: Spiritual Care available upon further referral  Family/Friends Plan of Care: Spiritual Care available upon further referral    Electronically signed by Chaplain Clay on 10/3/2024 at 11:13 AM

## 2024-10-04 PROBLEM — K57.20 PERFORATION OF SIGMOID COLON DUE TO DIVERTICULITIS: Status: ACTIVE | Noted: 2024-10-04

## 2024-10-04 LAB
ALBUMIN SERPL-MCNC: 2.3 G/DL (ref 3.5–5.2)
ALP SERPL-CCNC: 56 U/L (ref 40–129)
ALT SERPL-CCNC: 11 U/L (ref 5–41)
ANION GAP SERPL CALCULATED.3IONS-SCNC: 8 MMOL/L (ref 9–17)
AST SERPL-CCNC: 19 U/L
BASOPHILS # BLD: 0.03 K/UL (ref 0–0.2)
BASOPHILS NFR BLD: 0 % (ref 0–2)
BILIRUB DIRECT SERPL-MCNC: <0.1 MG/DL
BILIRUB INDIRECT SERPL-MCNC: ABNORMAL MG/DL (ref 0–1)
BILIRUB SERPL-MCNC: 0.3 MG/DL (ref 0.3–1.2)
BUN SERPL-MCNC: 4 MG/DL (ref 6–20)
BUN/CREAT SERPL: 8 (ref 9–20)
CA-I BLD-SCNC: 1.2 MMOL/L (ref 1.13–1.33)
CALCIUM SERPL-MCNC: 7.8 MG/DL (ref 8.6–10.4)
CHLORIDE SERPL-SCNC: 103 MMOL/L (ref 98–107)
CO2 SERPL-SCNC: 26 MMOL/L (ref 20–31)
CREAT SERPL-MCNC: 0.5 MG/DL (ref 0.7–1.2)
EOSINOPHIL # BLD: 0.4 K/UL (ref 0–0.44)
EOSINOPHILS RELATIVE PERCENT: 3 % (ref 1–4)
ERYTHROCYTE [DISTWIDTH] IN BLOOD BY AUTOMATED COUNT: 13.4 % (ref 11.8–14.4)
GFR, ESTIMATED: >90 ML/MIN/1.73M2
GLUCOSE BLD-MCNC: 111 MG/DL (ref 75–110)
GLUCOSE BLD-MCNC: 120 MG/DL (ref 75–110)
GLUCOSE BLD-MCNC: 129 MG/DL (ref 75–110)
GLUCOSE BLD-MCNC: 129 MG/DL (ref 75–110)
GLUCOSE BLD-MCNC: 99 MG/DL (ref 75–110)
GLUCOSE SERPL-MCNC: 116 MG/DL (ref 70–99)
HCT VFR BLD AUTO: 32.7 % (ref 40.7–50.3)
HGB BLD-MCNC: 11.2 G/DL (ref 13–17)
IMM GRANULOCYTES # BLD AUTO: 0.18 K/UL (ref 0–0.3)
IMM GRANULOCYTES NFR BLD: 1 %
LYMPHOCYTES NFR BLD: 1.1 K/UL (ref 1.1–3.7)
LYMPHOCYTES RELATIVE PERCENT: 8 % (ref 24–43)
MAGNESIUM SERPL-MCNC: 1.8 MG/DL (ref 1.6–2.6)
MCH RBC QN AUTO: 32.1 PG (ref 25.2–33.5)
MCHC RBC AUTO-ENTMCNC: 34.3 G/DL (ref 28.4–34.8)
MCV RBC AUTO: 93.7 FL (ref 82.6–102.9)
MONOCYTES NFR BLD: 1.08 K/UL (ref 0.1–1.2)
MONOCYTES NFR BLD: 7 % (ref 3–12)
NEUTROPHILS NFR BLD: 81 % (ref 36–65)
NEUTS SEG NFR BLD: 11.91 K/UL (ref 1.5–8.1)
NRBC BLD-RTO: 0 PER 100 WBC
PHOSPHATE SERPL-MCNC: 2.4 MG/DL (ref 2.5–4.5)
PLATELET # BLD AUTO: 320 K/UL (ref 138–453)
PMV BLD AUTO: 10.4 FL (ref 8.1–13.5)
POTASSIUM SERPL-SCNC: 4.2 MMOL/L (ref 3.7–5.3)
PROT SERPL-MCNC: 4.7 G/DL (ref 6.4–8.3)
RBC # BLD AUTO: 3.49 M/UL (ref 4.21–5.77)
SODIUM SERPL-SCNC: 137 MMOL/L (ref 135–144)
SURGICAL PATHOLOGY REPORT: NORMAL
TRIGL SERPL-MCNC: 58 MG/DL
WBC OTHER # BLD: 14.7 K/UL (ref 3.5–11.3)

## 2024-10-04 PROCEDURE — 6360000002 HC RX W HCPCS: Performed by: NURSE PRACTITIONER

## 2024-10-04 PROCEDURE — 6360000002 HC RX W HCPCS: Performed by: FAMILY MEDICINE

## 2024-10-04 PROCEDURE — 85025 COMPLETE CBC W/AUTO DIFF WBC: CPT

## 2024-10-04 PROCEDURE — 84100 ASSAY OF PHOSPHORUS: CPT

## 2024-10-04 PROCEDURE — 80048 BASIC METABOLIC PNL TOTAL CA: CPT

## 2024-10-04 PROCEDURE — 99232 SBSQ HOSP IP/OBS MODERATE 35: CPT | Performed by: FAMILY MEDICINE

## 2024-10-04 PROCEDURE — 6360000002 HC RX W HCPCS: Performed by: SURGERY

## 2024-10-04 PROCEDURE — 84478 ASSAY OF TRIGLYCERIDES: CPT

## 2024-10-04 PROCEDURE — 2580000003 HC RX 258: Performed by: SURGERY

## 2024-10-04 PROCEDURE — 6360000002 HC RX W HCPCS

## 2024-10-04 PROCEDURE — 82947 ASSAY GLUCOSE BLOOD QUANT: CPT

## 2024-10-04 PROCEDURE — 6370000000 HC RX 637 (ALT 250 FOR IP)

## 2024-10-04 PROCEDURE — 83735 ASSAY OF MAGNESIUM: CPT

## 2024-10-04 PROCEDURE — 2500000003 HC RX 250 WO HCPCS

## 2024-10-04 PROCEDURE — 80076 HEPATIC FUNCTION PANEL: CPT

## 2024-10-04 PROCEDURE — 1200000000 HC SEMI PRIVATE

## 2024-10-04 PROCEDURE — 82330 ASSAY OF CALCIUM: CPT

## 2024-10-04 PROCEDURE — 6370000000 HC RX 637 (ALT 250 FOR IP): Performed by: SURGERY

## 2024-10-04 PROCEDURE — 99213 OFFICE O/P EST LOW 20 MIN: CPT

## 2024-10-04 PROCEDURE — 6370000000 HC RX 637 (ALT 250 FOR IP): Performed by: NURSE PRACTITIONER

## 2024-10-04 PROCEDURE — 36415 COLL VENOUS BLD VENIPUNCTURE: CPT

## 2024-10-04 PROCEDURE — 2500000003 HC RX 250 WO HCPCS: Performed by: NURSE PRACTITIONER

## 2024-10-04 RX ORDER — LISINOPRIL 20 MG/1
20 TABLET ORAL DAILY
Status: DISCONTINUED | OUTPATIENT
Start: 2024-10-04 | End: 2024-10-07

## 2024-10-04 RX ADMIN — POTASSIUM CHLORIDE, DEXTROSE MONOHYDRATE AND SODIUM CHLORIDE: 150; 5; 450 INJECTION, SOLUTION INTRAVENOUS at 18:18

## 2024-10-04 RX ADMIN — METRONIDAZOLE 500 MG: 500 INJECTION, SOLUTION INTRAVENOUS at 12:28

## 2024-10-04 RX ADMIN — SODIUM CHLORIDE, PRESERVATIVE FREE 40 MG: 5 INJECTION INTRAVENOUS at 07:48

## 2024-10-04 RX ADMIN — SODIUM CHLORIDE, PRESERVATIVE FREE 10 ML: 5 INJECTION INTRAVENOUS at 20:51

## 2024-10-04 RX ADMIN — ACETAMINOPHEN 1000 MG: 500 TABLET ORAL at 07:54

## 2024-10-04 RX ADMIN — SODIUM CHLORIDE: 9 INJECTION, SOLUTION INTRAVENOUS at 04:44

## 2024-10-04 RX ADMIN — GABAPENTIN 300 MG: 300 CAPSULE ORAL at 22:37

## 2024-10-04 RX ADMIN — HYDROMORPHONE HYDROCHLORIDE 0.5 MG: 1 INJECTION, SOLUTION INTRAMUSCULAR; INTRAVENOUS; SUBCUTANEOUS at 03:46

## 2024-10-04 RX ADMIN — KETOROLAC TROMETHAMINE 30 MG: 30 INJECTION, SOLUTION INTRAMUSCULAR at 12:24

## 2024-10-04 RX ADMIN — GABAPENTIN 300 MG: 300 CAPSULE ORAL at 15:05

## 2024-10-04 RX ADMIN — GABAPENTIN 300 MG: 300 CAPSULE ORAL at 07:54

## 2024-10-04 RX ADMIN — HYDROMORPHONE HYDROCHLORIDE 0.5 MG: 1 INJECTION, SOLUTION INTRAMUSCULAR; INTRAVENOUS; SUBCUTANEOUS at 00:44

## 2024-10-04 RX ADMIN — METHOCARBAMOL 750 MG: 750 TABLET ORAL at 18:24

## 2024-10-04 RX ADMIN — KETOROLAC TROMETHAMINE 30 MG: 30 INJECTION, SOLUTION INTRAMUSCULAR at 18:19

## 2024-10-04 RX ADMIN — CIPROFLOXACIN 200 MG: 2 INJECTION, SOLUTION INTRAVENOUS at 16:38

## 2024-10-04 RX ADMIN — GUAIFENESIN 200 MG: 200 SOLUTION ORAL at 09:45

## 2024-10-04 RX ADMIN — HYDROMORPHONE HYDROCHLORIDE 0.5 MG: 1 INJECTION, SOLUTION INTRAMUSCULAR; INTRAVENOUS; SUBCUTANEOUS at 14:29

## 2024-10-04 RX ADMIN — METRONIDAZOLE 500 MG: 500 INJECTION, SOLUTION INTRAVENOUS at 20:51

## 2024-10-04 RX ADMIN — HYDROMORPHONE HYDROCHLORIDE 0.5 MG: 1 INJECTION, SOLUTION INTRAMUSCULAR; INTRAVENOUS; SUBCUTANEOUS at 20:54

## 2024-10-04 RX ADMIN — LORAZEPAM 0.5 MG: 2 INJECTION INTRAMUSCULAR; INTRAVENOUS at 16:44

## 2024-10-04 RX ADMIN — ACETAMINOPHEN 1000 MG: 500 TABLET ORAL at 00:02

## 2024-10-04 RX ADMIN — CIPROFLOXACIN 200 MG: 2 INJECTION, SOLUTION INTRAVENOUS at 04:55

## 2024-10-04 RX ADMIN — METRONIDAZOLE 500 MG: 500 INJECTION, SOLUTION INTRAVENOUS at 04:48

## 2024-10-04 RX ADMIN — ENOXAPARIN SODIUM 40 MG: 100 INJECTION SUBCUTANEOUS at 09:45

## 2024-10-04 RX ADMIN — POTASSIUM CHLORIDE, DEXTROSE MONOHYDRATE AND SODIUM CHLORIDE: 150; 5; 450 INJECTION, SOLUTION INTRAVENOUS at 09:57

## 2024-10-04 RX ADMIN — LORAZEPAM 1 MG: 2 INJECTION INTRAMUSCULAR; INTRAVENOUS at 22:37

## 2024-10-04 RX ADMIN — LISINOPRIL 20 MG: 20 TABLET ORAL at 01:43

## 2024-10-04 RX ADMIN — ACETAMINOPHEN 1000 MG: 500 TABLET ORAL at 22:37

## 2024-10-04 RX ADMIN — ACETAMINOPHEN 1000 MG: 500 TABLET ORAL at 15:05

## 2024-10-04 RX ADMIN — HYDROMORPHONE HYDROCHLORIDE 0.5 MG: 1 INJECTION, SOLUTION INTRAMUSCULAR; INTRAVENOUS; SUBCUTANEOUS at 11:14

## 2024-10-04 RX ADMIN — HYDROMORPHONE HYDROCHLORIDE 0.5 MG: 1 INJECTION, SOLUTION INTRAMUSCULAR; INTRAVENOUS; SUBCUTANEOUS at 19:33

## 2024-10-04 RX ADMIN — KETOROLAC TROMETHAMINE 30 MG: 30 INJECTION, SOLUTION INTRAMUSCULAR at 06:02

## 2024-10-04 RX ADMIN — CIPROFLOXACIN 200 MG: 2 INJECTION, SOLUTION INTRAVENOUS at 05:57

## 2024-10-04 RX ADMIN — CIPROFLOXACIN 200 MG: 2 INJECTION, SOLUTION INTRAVENOUS at 17:39

## 2024-10-04 RX ADMIN — GABAPENTIN 300 MG: 300 CAPSULE ORAL at 00:01

## 2024-10-04 RX ADMIN — HYDROMORPHONE HYDROCHLORIDE 0.5 MG: 1 INJECTION, SOLUTION INTRAMUSCULAR; INTRAVENOUS; SUBCUTANEOUS at 08:00

## 2024-10-04 RX ADMIN — KETOROLAC TROMETHAMINE 30 MG: 30 INJECTION, SOLUTION INTRAMUSCULAR at 00:02

## 2024-10-04 RX ADMIN — HYDROMORPHONE HYDROCHLORIDE 0.5 MG: 1 INJECTION, SOLUTION INTRAMUSCULAR; INTRAVENOUS; SUBCUTANEOUS at 17:34

## 2024-10-04 RX ADMIN — TRAZODONE HYDROCHLORIDE 100 MG: 100 TABLET ORAL at 03:50

## 2024-10-04 RX ADMIN — SODIUM CHLORIDE: 9 INJECTION, SOLUTION INTRAVENOUS at 04:52

## 2024-10-04 RX ADMIN — METHOCARBAMOL 750 MG: 750 TABLET ORAL at 07:54

## 2024-10-04 RX ADMIN — LORAZEPAM 1 MG: 2 INJECTION INTRAMUSCULAR; INTRAVENOUS at 01:54

## 2024-10-04 RX ADMIN — METHOCARBAMOL 750 MG: 750 TABLET ORAL at 12:24

## 2024-10-04 RX ADMIN — OXYCODONE HYDROCHLORIDE 5 MG: 5 TABLET ORAL at 18:24

## 2024-10-04 RX ADMIN — METHOCARBAMOL 750 MG: 750 TABLET ORAL at 01:43

## 2024-10-04 RX ADMIN — OXYCODONE HYDROCHLORIDE 5 MG: 5 TABLET ORAL at 09:58

## 2024-10-04 ASSESSMENT — PAIN DESCRIPTION - DESCRIPTORS
DESCRIPTORS: SORE
DESCRIPTORS: ACHING;SHARP
DESCRIPTORS: ACHING;THROBBING
DESCRIPTORS: ACHING
DESCRIPTORS: SORE
DESCRIPTORS: SORE
DESCRIPTORS: ACHING
DESCRIPTORS: SORE
DESCRIPTORS: ACHING
DESCRIPTORS: SORE
DESCRIPTORS: ACHING
DESCRIPTORS: ACHING

## 2024-10-04 ASSESSMENT — PAIN - FUNCTIONAL ASSESSMENT
PAIN_FUNCTIONAL_ASSESSMENT: PREVENTS OR INTERFERES SOME ACTIVE ACTIVITIES AND ADLS
PAIN_FUNCTIONAL_ASSESSMENT: ACTIVITIES ARE NOT PREVENTED
PAIN_FUNCTIONAL_ASSESSMENT: PREVENTS OR INTERFERES SOME ACTIVE ACTIVITIES AND ADLS
PAIN_FUNCTIONAL_ASSESSMENT: ACTIVITIES ARE NOT PREVENTED
PAIN_FUNCTIONAL_ASSESSMENT: PREVENTS OR INTERFERES SOME ACTIVE ACTIVITIES AND ADLS

## 2024-10-04 ASSESSMENT — PAIN SCALES - GENERAL
PAINLEVEL_OUTOF10: 0
PAINLEVEL_OUTOF10: 7
PAINLEVEL_OUTOF10: 8
PAINLEVEL_OUTOF10: 8
PAINLEVEL_OUTOF10: 0
PAINLEVEL_OUTOF10: 8
PAINLEVEL_OUTOF10: 2
PAINLEVEL_OUTOF10: 7
PAINLEVEL_OUTOF10: 7
PAINLEVEL_OUTOF10: 8
PAINLEVEL_OUTOF10: 9
PAINLEVEL_OUTOF10: 8
PAINLEVEL_OUTOF10: 7
PAINLEVEL_OUTOF10: 8
PAINLEVEL_OUTOF10: 9
PAINLEVEL_OUTOF10: 2
PAINLEVEL_OUTOF10: 2
PAINLEVEL_OUTOF10: 10
PAINLEVEL_OUTOF10: 8
PAINLEVEL_OUTOF10: 8
PAINLEVEL_OUTOF10: 10
PAINLEVEL_OUTOF10: 5
PAINLEVEL_OUTOF10: 7
PAINLEVEL_OUTOF10: 4

## 2024-10-04 ASSESSMENT — PAIN DESCRIPTION - ORIENTATION
ORIENTATION: MID
ORIENTATION: RIGHT;MID
ORIENTATION: MID
ORIENTATION: RIGHT;MID
ORIENTATION: MID
ORIENTATION: MID

## 2024-10-04 ASSESSMENT — PAIN DESCRIPTION - LOCATION
LOCATION: ABDOMEN
LOCATION: ABDOMEN;FLANK
LOCATION: ABDOMEN;PENIS
LOCATION: ABDOMEN;INCISION
LOCATION: ABDOMEN;PENIS
LOCATION: ABDOMEN
LOCATION: ABDOMEN;PENIS
LOCATION: ABDOMEN
LOCATION: ABDOMEN;PENIS
LOCATION: ABDOMEN
LOCATION: ABDOMEN

## 2024-10-04 ASSESSMENT — PAIN DESCRIPTION - FREQUENCY: FREQUENCY: CONTINUOUS

## 2024-10-04 ASSESSMENT — PAIN DESCRIPTION - PAIN TYPE: TYPE: ACUTE PAIN

## 2024-10-04 NOTE — PROGRESS NOTES
[facility]  Office: [phone]  Fady Flores, DO, Chris Medina, DO, Nick Yarbrough DO, Gary Wei, DO, Aileen Lloyd MD, Marie Astorga MD, Jacob Kelly MD, Zaira Estrella MD,  German Ramos MD, Vi Castillo MD, Sigifredo Schwartz MD,  Denice Streeter DO, Sebastián Suggs MD, Tye Boateng MD, Stan Flores DO, Nida Wilhelm MD,  Damir Herrera DO, Mallory Brasher MD, Rosana Escalante MD, Conchis Jiménez MD, Salazar Trinh MD,  Santos Rai MD, Jonna Delgado MD, Raymundo Ambrocio MD, Silver Fisher MD, Angel Dhaliwal MD, Michael Lewis MD, Maxwell Sifuentes, DO, Camron Salas DO, Dillon Brizuela DO, Delvin Leija MD, Shirley Waterhouse, CNP,  Kasie Early CNP, Maxwell Lugo, CNP,  Desiree Hannah, [facility], Stacie Moss, CNP, Rosangela Kennedy, CNP, Lauren Franco, CNP, Layla Fritz, CNP, Leandra Vega, PA-C, Ivory Cool, PA-C, Taina Cunningham, CNP, Denys Pond, CNP,  Anusha Romero, CNP, Twila Montgomery, CNP, Gretchen Rudolph, CNP, Maria De Jesus Sebastian, CNS, Michelle Thapa, [facility], Dulce Sky, CNP, Tracy Schwab, CNP       [facility]      Daily Progress Note     Admit Date: 10/1/2024  Bed/Room No.  1004/1004-02  Admitting Physician : Jacob Kelly MD  Code Status :Full Code  Hospital Day:  LOS: 3 days   Chief Complaint:     Chief Complaint   Patient presents with    Abdominal Pain     Pt was d/c from facility at 1300 today, returned back to facility 1500 as he began to feel worse again with his symptoms. Patient reports he is unable to eat. Denies having a BM. Denies urination complications.      Principal Problem:    Perforation of sigmoid colon due to diverticulitis  Active Problems:    Ankylosing spondylitis (HCC)    Arthritis, rheumatoid (HCC)    Alcohol abuse    Benign essential hypertension    Intra-abdominal free air of unknown etiology  Resolved Problems:    * No resolved hospital problems. *    Subjective :        Interval History/Significant events :

## 2024-10-04 NOTE — PROGRESS NOTES
[facility]  General Surgery  Consult Progress Note    Service date: 10/4/2024, 6:40 AM  Room : [facility]1004-   Name: Abdifatah De Anda  MRN: [id number]    Length of stay: 3 day(s)    OR date: 10/2/2024  Procedure(s):  LAPAROTOMY EXPLORATORY DESCENDING COLOSTOMY, SIGMOID RESECTION, drainage of pelvic abscess, tap block  POD#2    Subjective   Interval update:   -Overall, patient is doing well  -No significant overnight issues noted.  -Abdominal pain well controlled  -Patient is currently NPO   -Patient is currently feeling hungry.  No output from the stoma yet.  -Patient reports no nausea/ vomiting, fever/chills, chest pain, shortness of breath, or other symptoms.         Objective   Physical exam: BP (!) 144/106   Pulse 91   Temp 98.6 °F (37 °C) (Oral)   Resp 17   SpO2 94%     General: Patient is resting comfortably on exam, not in any distress and is pleasant and cooperative.   Neuro: alert and oriented x3   Abdomen: soft, non tender, non distended. Incision: Clean, dry, intact. No erythema noted. Stoma is little dusky and producing only bowel sweat at present. GALDINO drain in place -  55 cc serous.    Chest: Non-labored, symmetrical respirations.   CVS: Pulse RRR       Date 10/04/24 0000 - 10/04/24 2359   Shift 0000-7082 6696-4653 5954-4224 24 Hour Total   INTAKE   Shift Total       OUTPUT   Drains 20   20   Shift Total 20   20   Weight (kg)             Medications:    Current Facility-Administered Medications   Medication Dose Route Frequency Provider Last Rate Last Admin    lisinopril (PRINIVIL;ZESTRIL) tablet 20 mg  20 mg Oral Daily Waterhouse, Shirley Ann, APRN - CNP   20 mg at 10/04/24 0143    oxyCODONE (ROXICODONE) immediate release tablet 5 mg  5 mg Oral Q6H PRN Donny Kennedy MD   5 mg at 10/03/24 2256    methocarbamol (ROBAXIN) tablet 750 mg  750 mg Oral Q6H Donny Kennedy MD   750 mg at 10/04/24 0143    gabapentin (NEURONTIN) capsule 300 mg  300 mg Oral q8h Donny Kennedy MD   300 mg

## 2024-10-04 NOTE — PROGRESS NOTES
[facility]  New Ostomy Progress Note      NAME:  Abdifatah De Anda  MEDICAL RECORD NUMBER:  [id number]  AGE: 39 y.o.   GENDER:  male  :  1985  TODAY'S DATE:  10/4/2024    Subjective      WOC nursing consult for new colostomy education    Type of ostomy: Colostomy    Location of ostomy: LUQ    Date of surgery/surgeon: Dr. 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    Dry eyes H04.123    Floaters H43.399    Immunosuppressed status (HCC) D84.9    Iridocyclitis associated with HLA-B27 positivity H20.9    CHRISTINA (acute kidney injury) (HCC) N17.9    Arthritis, rheumatoid (HCC) M06.9    Anxiety F41.9    Tobacco abuse Z72.0    Alcohol abuse F10.10    Benign essential hypertension I10    Diverticulitis K57.92    History of opioid abuse ([facility]) F11.11    Intra-abdominal free air of unknown etiology K66.8       LABS:  WBC:    Lab Results   Component Value Date/Time    WBC 14.7 10/04/2024 05:51 AM     H/H:    Lab Results   Component Value Date/Time    HGB 11.2 10/04/2024 05:51 AM    HCT 32.7 10/04/2024 05:51 AM     BMP:    Lab Results   Component Value Date/Time     10/04/2024 05:51 AM    K 4.2 10/04/2024 05:51 AM     10/04/2024 05:51 AM    CO2 26 10/04/2024 05:51 AM    BUN 4 10/04/2024 05:51 AM    CREATININE 0.5 10/04/2024 05:51 AM    CALCIUM 7.8 10/04/2024 05:51 AM    GFRAA >60 2020 05:49 AM    LABGLOM >90 10/04/2024 05:51 AM    GLUCOSE 116 10/04/2024 05:51 AM     PTT:    Lab Results   Component Value Date/Time    APTT 29.9 2020 09:53 AM   [APTT}  PT/INR:    Lab Results   Component Value Date/Time    PROTIME 12.3 2024 04:10 AM    INR 0.9 2024 04:10 AM           Assessment                     N/      Electronically signed by SAM CRUZ RN on  10/4/2024 at 11:44 AM

## 2024-10-04 NOTE — PLAN OF CARE
Patient is A/Ox4, on RA and ambulates independently. Normal sinus/sinus tachy on monitor. Form is signed for to leave the unit to smoke.   Bowel bundled day 2 completed  Diet advanced to clear liquids, tolerating well.  Abdominal dressing change.   GALDINO in place draining well.   IV flagyl/cipro continues  PRN dilaudid given x 4  One time dose dilaudid given after dressing change.  PRN robitussin given x1.   PRN ativan given x1.   PRN ford given x2.    Plan of care reviewed, questions answered, bed in lowest position, call light within reach.   Problem: Discharge Planning  Goal: Discharge to home or other facility with appropriate resources  10/4/2024 0716 by Sheri Lofton RN  Outcome: Progressing     Problem: Safety - Adult  Goal: Free from fall injury  10/4/2024 0716 by Sheri Lofton RN  Outcome: Progressing     Problem: Pain  Goal: Verbalizes/displays adequate comfort level or baseline comfort level  10/4/2024 0716 by Sheri Lofton RN  Outcome: Progressing  Patient having pain on their abdomen and rates it a 8. Pain interventions includefrequent position changes and medication. Patients goal for pain relief is 2. The need for pain and symptom management will be considered in the discharge planning process to ensure patients comfort.        Problem: Respiratory - Adult  Goal: Achieves optimal ventilation and oxygenation  10/4/2024 0716 by Sheri Lofton RN  Outcome: Progressing     Problem: Cardiovascular - Adult  Goal: Maintains optimal cardiac output and hemodynamic stability  10/4/2024 0716 by Sheri Lofton RN  Outcome: Progressing     Problem: Gastrointestinal - Adult  Goal: Minimal or absence of nausea and vomiting  10/4/2024 0716 by Sheri Lofton RN  Outcome: Progressing     Problem: ABCDS Injury Assessment  Goal: Absence of physical injury  10/4/2024 0716 by Sheri Lofton RN  Outcome: Progressing     Problem: Nutrition Deficit:  Goal: Optimize nutritional status  10/4/2024 0716 by Sheri Lofton,

## 2024-10-04 NOTE — PLAN OF CARE
Patient is A&Ox4, in on room air, and is sinus tachy/rhythm on monitor.  Patient is bowel bundle day 2 today.  Patient has a GALDINO drain, colostomy, and midline incision. Dressing changed by surgery this morning.   Patient has dextrose 5% and 0.45% NaCl with Kcl 20mEq continuous at 120mL/hr  Patient is a Q4 blood sugar check, pt is NPO but is allowed sips with meds.  Patient complaining of pain, dilaudid and ford given PRN and Toradol scheduled. See MAR for administration.   Patient is on IV flagyl and Cipro  IV dressing changes complete and all tubing changed.   Patient feeling anxious, 1x dose ativan given  Patient started on Lisinopril for elevated BP.   Patient went outside to smoke during shift, form is signed in chart  Patient complaining of congestion, Robitussin ordered   Safety maintained throughout shift, bed in lowest position, call light within reach, hourly rounding continued.    Patient having pain on their abdomen and rates it a 8. Pain interventions include relaxation techniques and medication. Patients goal for pain relief is 3. The need for pain and symptom management will be considered in the discharge planning process to ensure patients comfort.    Problem: Discharge Planning  Goal: Discharge to home or other facility with appropriate resources  Outcome: Progressing     Problem: Safety - Adult  Goal: Free from fall injury  Outcome: Progressing     Problem: Pain  Goal: Verbalizes/displays adequate comfort level or baseline comfort level  Outcome: Progressing     Problem: Respiratory - Adult  Goal: Achieves optimal ventilation and oxygenation  Outcome: Progressing     Problem: Cardiovascular - Adult  Goal: Maintains optimal cardiac output and hemodynamic stability  Outcome: Progressing     Problem: Gastrointestinal - Adult  Goal: Minimal or absence of nausea and vomiting  Outcome: Progressing     Problem: Gastrointestinal - Adult  Goal: Maintains or returns to baseline bowel function  Outcome:

## 2024-10-05 LAB
ANION GAP SERPL CALCULATED.3IONS-SCNC: 9 MMOL/L (ref 9–17)
BASOPHILS # BLD: 0.1 K/UL (ref 0–0.2)
BASOPHILS NFR BLD: 1 % (ref 0–2)
BUN SERPL-MCNC: 2 MG/DL (ref 6–20)
BUN/CREAT SERPL: 3 (ref 9–20)
CA-I BLD-SCNC: 1.28 MMOL/L (ref 1.13–1.33)
CALCIUM SERPL-MCNC: 8.8 MG/DL (ref 8.6–10.4)
CHLORIDE SERPL-SCNC: 101 MMOL/L (ref 98–107)
CO2 SERPL-SCNC: 28 MMOL/L (ref 20–31)
CREAT SERPL-MCNC: 0.6 MG/DL (ref 0.7–1.2)
EOSINOPHIL # BLD: 0.49 K/UL (ref 0–0.44)
EOSINOPHILS RELATIVE PERCENT: 4 % (ref 1–4)
ERYTHROCYTE [DISTWIDTH] IN BLOOD BY AUTOMATED COUNT: 13.8 % (ref 11.8–14.4)
GFR, ESTIMATED: >90 ML/MIN/1.73M2
GLUCOSE BLD-MCNC: 105 MG/DL (ref 75–110)
GLUCOSE BLD-MCNC: 107 MG/DL (ref 75–110)
GLUCOSE BLD-MCNC: 108 MG/DL (ref 75–110)
GLUCOSE BLD-MCNC: 110 MG/DL (ref 75–110)
GLUCOSE SERPL-MCNC: 100 MG/DL (ref 70–99)
HCT VFR BLD AUTO: 36.1 % (ref 40.7–50.3)
HGB BLD-MCNC: 12.1 G/DL (ref 13–17)
IMM GRANULOCYTES # BLD AUTO: 0.2 K/UL (ref 0–0.3)
IMM GRANULOCYTES NFR BLD: 1 %
LYMPHOCYTES NFR BLD: 1.47 K/UL (ref 1.1–3.7)
LYMPHOCYTES RELATIVE PERCENT: 10 % (ref 24–43)
MAGNESIUM SERPL-MCNC: 1.8 MG/DL (ref 1.6–2.6)
MCH RBC QN AUTO: 32.1 PG (ref 25.2–33.5)
MCHC RBC AUTO-ENTMCNC: 33.5 G/DL (ref 28.4–34.8)
MCV RBC AUTO: 95.8 FL (ref 82.6–102.9)
MONOCYTES NFR BLD: 1.16 K/UL (ref 0.1–1.2)
MONOCYTES NFR BLD: 8 % (ref 3–12)
NEUTROPHILS NFR BLD: 76 % (ref 36–65)
NEUTS SEG NFR BLD: 10.77 K/UL (ref 1.5–8.1)
NRBC BLD-RTO: 0 PER 100 WBC
PLATELET # BLD AUTO: 416 K/UL (ref 138–453)
PMV BLD AUTO: 9.9 FL (ref 8.1–13.5)
POTASSIUM SERPL-SCNC: 4 MMOL/L (ref 3.7–5.3)
RBC # BLD AUTO: 3.77 M/UL (ref 4.21–5.77)
SODIUM SERPL-SCNC: 138 MMOL/L (ref 135–144)
WBC OTHER # BLD: 14.2 K/UL (ref 3.5–11.3)

## 2024-10-05 PROCEDURE — 6370000000 HC RX 637 (ALT 250 FOR IP)

## 2024-10-05 PROCEDURE — 83735 ASSAY OF MAGNESIUM: CPT

## 2024-10-05 PROCEDURE — 6360000002 HC RX W HCPCS

## 2024-10-05 PROCEDURE — 36415 COLL VENOUS BLD VENIPUNCTURE: CPT

## 2024-10-05 PROCEDURE — 82947 ASSAY GLUCOSE BLOOD QUANT: CPT

## 2024-10-05 PROCEDURE — 6370000000 HC RX 637 (ALT 250 FOR IP): Performed by: NURSE PRACTITIONER

## 2024-10-05 PROCEDURE — 85025 COMPLETE CBC W/AUTO DIFF WBC: CPT

## 2024-10-05 PROCEDURE — 2500000003 HC RX 250 WO HCPCS

## 2024-10-05 PROCEDURE — 6360000002 HC RX W HCPCS: Performed by: SURGERY

## 2024-10-05 PROCEDURE — 6370000000 HC RX 637 (ALT 250 FOR IP): Performed by: STUDENT IN AN ORGANIZED HEALTH CARE EDUCATION/TRAINING PROGRAM

## 2024-10-05 PROCEDURE — 6360000002 HC RX W HCPCS: Performed by: STUDENT IN AN ORGANIZED HEALTH CARE EDUCATION/TRAINING PROGRAM

## 2024-10-05 PROCEDURE — 82330 ASSAY OF CALCIUM: CPT

## 2024-10-05 PROCEDURE — 1200000000 HC SEMI PRIVATE

## 2024-10-05 PROCEDURE — 6360000002 HC RX W HCPCS: Performed by: FAMILY MEDICINE

## 2024-10-05 PROCEDURE — 6370000000 HC RX 637 (ALT 250 FOR IP): Performed by: SURGERY

## 2024-10-05 PROCEDURE — 80048 BASIC METABOLIC PNL TOTAL CA: CPT

## 2024-10-05 PROCEDURE — 2580000003 HC RX 258: Performed by: SURGERY

## 2024-10-05 PROCEDURE — 99232 SBSQ HOSP IP/OBS MODERATE 35: CPT | Performed by: FAMILY MEDICINE

## 2024-10-05 RX ORDER — OXYCODONE HYDROCHLORIDE 5 MG/1
5 TABLET ORAL EVERY 4 HOURS PRN
Status: DISCONTINUED | OUTPATIENT
Start: 2024-10-05 | End: 2024-10-05

## 2024-10-05 RX ORDER — OXYCODONE HYDROCHLORIDE 5 MG/1
5 TABLET ORAL EVERY 4 HOURS PRN
Status: DISCONTINUED | OUTPATIENT
Start: 2024-10-05 | End: 2024-10-10 | Stop reason: HOSPADM

## 2024-10-05 RX ORDER — OXYCODONE HYDROCHLORIDE 5 MG/1
10 TABLET ORAL EVERY 4 HOURS PRN
Status: DISCONTINUED | OUTPATIENT
Start: 2024-10-05 | End: 2024-10-10 | Stop reason: HOSPADM

## 2024-10-05 RX ADMIN — ONDANSETRON 4 MG: 4 TABLET, ORALLY DISINTEGRATING ORAL at 18:24

## 2024-10-05 RX ADMIN — CIPROFLOXACIN 200 MG: 2 INJECTION, SOLUTION INTRAVENOUS at 04:45

## 2024-10-05 RX ADMIN — METHOCARBAMOL 750 MG: 750 TABLET ORAL at 00:36

## 2024-10-05 RX ADMIN — OXYCODONE HYDROCHLORIDE 10 MG: 5 TABLET ORAL at 20:57

## 2024-10-05 RX ADMIN — CIPROFLOXACIN 200 MG: 2 INJECTION, SOLUTION INTRAVENOUS at 16:51

## 2024-10-05 RX ADMIN — GABAPENTIN 300 MG: 300 CAPSULE ORAL at 22:54

## 2024-10-05 RX ADMIN — OXYCODONE HYDROCHLORIDE 10 MG: 5 TABLET ORAL at 14:24

## 2024-10-05 RX ADMIN — ENOXAPARIN SODIUM 40 MG: 100 INJECTION SUBCUTANEOUS at 09:40

## 2024-10-05 RX ADMIN — METRONIDAZOLE 500 MG: 500 INJECTION, SOLUTION INTRAVENOUS at 14:47

## 2024-10-05 RX ADMIN — METRONIDAZOLE 500 MG: 500 INJECTION, SOLUTION INTRAVENOUS at 04:51

## 2024-10-05 RX ADMIN — LORAZEPAM 1 MG: 2 INJECTION INTRAMUSCULAR; INTRAVENOUS at 20:58

## 2024-10-05 RX ADMIN — OXYCODONE HYDROCHLORIDE 10 MG: 5 TABLET ORAL at 09:39

## 2024-10-05 RX ADMIN — HYDROMORPHONE HYDROCHLORIDE 0.5 MG: 1 INJECTION, SOLUTION INTRAMUSCULAR; INTRAVENOUS; SUBCUTANEOUS at 00:08

## 2024-10-05 RX ADMIN — METHOCARBAMOL 750 MG: 750 TABLET ORAL at 14:24

## 2024-10-05 RX ADMIN — OXYCODONE HYDROCHLORIDE 5 MG: 5 TABLET ORAL at 00:36

## 2024-10-05 RX ADMIN — HYDROMORPHONE HYDROCHLORIDE 0.5 MG: 1 INJECTION, SOLUTION INTRAMUSCULAR; INTRAVENOUS; SUBCUTANEOUS at 11:40

## 2024-10-05 RX ADMIN — SODIUM CHLORIDE, PRESERVATIVE FREE 40 MG: 5 INJECTION INTRAVENOUS at 09:40

## 2024-10-05 RX ADMIN — SODIUM CHLORIDE: 9 INJECTION, SOLUTION INTRAVENOUS at 14:46

## 2024-10-05 RX ADMIN — HYDROMORPHONE HYDROCHLORIDE 0.5 MG: 1 INJECTION, SOLUTION INTRAMUSCULAR; INTRAVENOUS; SUBCUTANEOUS at 04:48

## 2024-10-05 RX ADMIN — POTASSIUM CHLORIDE, DEXTROSE MONOHYDRATE AND SODIUM CHLORIDE: 150; 5; 450 INJECTION, SOLUTION INTRAVENOUS at 03:00

## 2024-10-05 RX ADMIN — GABAPENTIN 300 MG: 300 CAPSULE ORAL at 07:05

## 2024-10-05 RX ADMIN — METHOCARBAMOL 750 MG: 750 TABLET ORAL at 18:23

## 2024-10-05 RX ADMIN — KETOROLAC TROMETHAMINE 30 MG: 30 INJECTION, SOLUTION INTRAMUSCULAR at 05:59

## 2024-10-05 RX ADMIN — ACETAMINOPHEN 1000 MG: 500 TABLET ORAL at 07:04

## 2024-10-05 RX ADMIN — GABAPENTIN 300 MG: 300 CAPSULE ORAL at 16:47

## 2024-10-05 RX ADMIN — CIPROFLOXACIN 200 MG: 2 INJECTION, SOLUTION INTRAVENOUS at 05:57

## 2024-10-05 RX ADMIN — ACETAMINOPHEN 1000 MG: 500 TABLET ORAL at 22:54

## 2024-10-05 RX ADMIN — LISINOPRIL 20 MG: 20 TABLET ORAL at 09:39

## 2024-10-05 RX ADMIN — TRAZODONE HYDROCHLORIDE 100 MG: 100 TABLET ORAL at 00:36

## 2024-10-05 RX ADMIN — METRONIDAZOLE 500 MG: 500 INJECTION, SOLUTION INTRAVENOUS at 20:51

## 2024-10-05 RX ADMIN — KETOROLAC TROMETHAMINE 30 MG: 30 INJECTION, SOLUTION INTRAMUSCULAR at 00:36

## 2024-10-05 RX ADMIN — ACETAMINOPHEN 1000 MG: 500 TABLET ORAL at 16:47

## 2024-10-05 RX ADMIN — METHOCARBAMOL 750 MG: 750 TABLET ORAL at 07:04

## 2024-10-05 RX ADMIN — CIPROFLOXACIN 200 MG: 2 INJECTION, SOLUTION INTRAVENOUS at 18:26

## 2024-10-05 ASSESSMENT — PAIN SCALES - GENERAL
PAINLEVEL_OUTOF10: 6
PAINLEVEL_OUTOF10: 8
PAINLEVEL_OUTOF10: 7
PAINLEVEL_OUTOF10: 7
PAINLEVEL_OUTOF10: 8
PAINLEVEL_OUTOF10: 6
PAINLEVEL_OUTOF10: 7
PAINLEVEL_OUTOF10: 8
PAINLEVEL_OUTOF10: 7
PAINLEVEL_OUTOF10: 6
PAINLEVEL_OUTOF10: 9
PAINLEVEL_OUTOF10: 8

## 2024-10-05 ASSESSMENT — PAIN DESCRIPTION - ORIENTATION
ORIENTATION: MID
ORIENTATION: MID;RIGHT
ORIENTATION: MID
ORIENTATION: MID;RIGHT
ORIENTATION: RIGHT;LOWER
ORIENTATION: MID;RIGHT
ORIENTATION: MID
ORIENTATION: RIGHT;MID
ORIENTATION: RIGHT;MID
ORIENTATION: MID
ORIENTATION: MID

## 2024-10-05 ASSESSMENT — PAIN DESCRIPTION - DESCRIPTORS
DESCRIPTORS: ACHING;SORE;THROBBING
DESCRIPTORS: ACHING;SORE
DESCRIPTORS: ACHING;THROBBING
DESCRIPTORS: ACHING;SORE;THROBBING
DESCRIPTORS: ACHING;THROBBING
DESCRIPTORS: ACHING;SORE
DESCRIPTORS: ACHING;SORE
DESCRIPTORS: ACHING;THROBBING

## 2024-10-05 ASSESSMENT — PAIN - FUNCTIONAL ASSESSMENT
PAIN_FUNCTIONAL_ASSESSMENT: PREVENTS OR INTERFERES SOME ACTIVE ACTIVITIES AND ADLS
PAIN_FUNCTIONAL_ASSESSMENT: ACTIVITIES ARE NOT PREVENTED
PAIN_FUNCTIONAL_ASSESSMENT: PREVENTS OR INTERFERES SOME ACTIVE ACTIVITIES AND ADLS
PAIN_FUNCTIONAL_ASSESSMENT: PREVENTS OR INTERFERES SOME ACTIVE ACTIVITIES AND ADLS
PAIN_FUNCTIONAL_ASSESSMENT: ACTIVITIES ARE NOT PREVENTED
PAIN_FUNCTIONAL_ASSESSMENT: PREVENTS OR INTERFERES SOME ACTIVE ACTIVITIES AND ADLS

## 2024-10-05 ASSESSMENT — PAIN DESCRIPTION - LOCATION
LOCATION: ABDOMEN
LOCATION: ABDOMEN;INCISION
LOCATION: ABDOMEN
LOCATION: ABDOMEN;INCISION

## 2024-10-05 NOTE — PROGRESS NOTES
[facility]  General Surgery  Consult Progress Note    Service date: 10/5/2024, 7:35 AM  Room : [facility]1004-02   Name: Abdifatah De Anda  MRN: [id number]    Length of stay: 4 day(s)    OR date: 10/2/2024  Procedure(s):  LAPAROTOMY EXPLORATORY DESCENDING COLOSTOMY, SIGMOID RESECTION, drainage of pelvic abscess, tap block  POD#3    Subjective   Interval update:   -Overall, patient is doing well, No significant overnight issues noted. Patient reports increased abdominal pain after going outside to smoke. Discussed with patient need for decreasing IV pain medications. Patient having flatus from ostomy. Patient states pain is mostly around GALDINO drain in right abdomen. Patient reports no nausea/ vomiting, fever/chills, chest pain, shortness of breath, or other symptoms.         Objective   Physical exam: BP (!) 143/102   Pulse 89   Temp 98.4 °F (36.9 °C) (Oral)   Resp 18   Wt 75.2 kg (165 lb 11.2 oz)   SpO2 95%   BMI 27.57 kg/m²     General: Patient is resting comfortably on exam, not in any distress and is pleasant and cooperative.   Neuro: alert and oriented x3   Abdomen: soft, non tender, non distended. Incision: Clean, dry, intact. No erythema noted. Stoma is appears more pink this morning and producing only bowel sweat at present along with active flatus. GALDINO drain in place    Chest: Non-labored, symmetrical respirations.   CVS: Pulse RRR       Date 10/05/24 0000 - 10/05/24 2359   Shift 9576-7754 4799-1663 1822-1624 24 Hour Total   INTAKE   P.O.(mL/kg/hr) 240   240   I.V.(mL/kg) 1421.5(18.9)   1421.5(18.9)   IV Piggyback(mL/kg) 368.4(4.9)   368.4(4.9)   Shift Total(mL/kg) 2029.9(27)   2029.9(27)   OUTPUT   Shift Total(mL/kg)       Weight (kg) 75.2 75.2 75.2 75.2         Medications:    Current Facility-Administered Medications   Medication Dose Route Frequency Provider Last Rate Last Admin    oxyCODONE (ROXICODONE) immediate release tablet 5 mg  5 mg Oral Q4H PRN Rosangela Perez DO        Or    oxyCODONE

## 2024-10-05 NOTE — PLAN OF CARE
Pt A&Ox4 during shift up independently during shift.    this evening all other VS stable normal sinus on monitor   Patient having pain on their abdomen and rates it a 8. Pain interventions includepillow support/positioning, regular rest periods, and medicate per physician recommendation/order. Patients goal for pain relief is 5. The need for pain and symptom management will be considered in the discharge planning process to ensure patients comfort.    IV dilaudid discontinued during this shift and scheduled IV toradol discontinued   Pt not tolerating diet, diet switched back to clear liquids pt reports nausea this evening PRN Zofran administered (Refer to MAR)  Continuous fluids discontinued   Pt receiving IV Antibiotics (Refer to MAR for admin times)   Colostomy pouch changed due to leaking 50 mL out in soft stool during this shift     Problem: Gastrointestinal - Adult  Goal: Minimal or absence of nausea and vomiting  Outcome: Not Progressing       Problem: Discharge Planning  Goal: Discharge to home or other facility with appropriate resources  Outcome: Progressing     Problem: Safety - Adult  Goal: Free from fall injury  Outcome: Progressing     Problem: Pain  Goal: Verbalizes/displays adequate comfort level or baseline comfort level  Outcome: Progressing     Problem: Respiratory - Adult  Goal: Achieves optimal ventilation and oxygenation  Outcome: Progressing     Problem: Cardiovascular - Adult  Goal: Maintains optimal cardiac output and hemodynamic stability  Outcome: Progressing     Problem: Gastrointestinal - Adult  Goal: Maintains or returns to baseline bowel function  Outcome: Progressing     Problem: Gastrointestinal - Adult  Goal: Establish and maintain optimal ostomy function  Outcome: Progressing     Problem: ABCDS Injury Assessment  Goal: Absence of physical injury  Outcome: Progressing     Problem: Skin/Tissue Integrity - Adult  Goal: Incisions, wounds, or drain sites healing without S/S of

## 2024-10-05 NOTE — PROGRESS NOTES
[facility]  Office: [phone]  Fady Flores, DO, Chris Medina, DO, Nick Yarbrough DO, Gary Wei, DO, Aileen Lloyd MD, Marie Astorga MD, Jacob Kelly MD, Zaira Estrella MD,  German Rmaos MD, Vi Castillo MD, Sigifredo Schwartz MD,  Denice Streeter DO, Sebastián Suggs MD, Tye Boateng MD, Stan Flores DO, Nida Wilhelm MD,  Damir Herrera DO, Mallory Brasher MD, Rosana Escalante MD, Conchis Jiménez MD, Salazar Trinh MD,  Santos Rai MD, Jonna Delgado MD, Raymundo Ambrocio MD, Silver Fisher MD, Angel Dhaliwal MD, Michael Lewis MD, Maxwell Sifuentes, DO, Camron Salas DO, Dillon Brizuela DO, Delvin Leija MD, Shirley Waterhouse, CNP,  Kasie Early CNP, Maxwell Lugo, CNP,  Desiree Hannah, [facility], Stacie Moss, CNP, Rosangela Kennedy, CNP, Lauren Franco, CNP, Layla Fritz, CNP, Leandra Vega, PA-C, Ivory Cool, PA-C, Taina Cunningham, CNP, Denys Pond, CNP,  Anusha Romero, CNP, Twila Montgomery, CNP, Gretchen Rudolph, CNP, Maria De Jesus Sebastian, CNS, Michelle Thapa, CNP, Dulce Sky, CNP, Tracy Schwab, CNP       [facility]      Daily Progress Note     Admit Date: 10/1/2024  Bed/Room No.  1004/1004-02  Admitting Physician : Jacob Kelly MD  Code Status :Full Code  Hospital Day:  LOS: 4 days   Chief Complaint:     Chief Complaint   Patient presents with    Abdominal Pain     Pt was d/c from facility at 1300 today, returned back to facility 1500 as he began to feel worse again with his symptoms. Patient reports he is unable to eat. Denies having a BM. Denies urination complications.      Principal Problem:    Perforation of sigmoid colon due to diverticulitis  Active Problems:    Ankylosing spondylitis (HCC)    Arthritis, rheumatoid (HCC)    Alcohol abuse    Benign essential hypertension    Intra-abdominal free air of unknown etiology  Resolved Problems:    * No resolved hospital problems. *    Subjective :        Interval History/Significant events :

## 2024-10-05 NOTE — PLAN OF CARE
Abdifatah resting well at this time with no s/s of pain. Have given PRN and scheduled interventions with minimal effect, but pain is not too much of an impediment to activity as he is able to ambulate distances to go outside and smoke. Pt c/o anxiety and gave PRN Ativan and Trazodone to fair effect. Discussed his home medication regimen for anxiety, and he stated he has a new script waiting for Trazodone, but has taken several medications for anxiety that did not help. He states he uses alcohol to help him cope. Discussed how he may be able to get help to reduce alcohol dependence and appropriately treat OCD s/s and anxiety on discharge. Pt is afebrile on ABT therapy for intra-abdominal infection. with no s/s of adverse reaction noted. Colostomy is in tact with no drainage. GALDINO is draining serosanguineous drainage at this time. Dressing in tact to midline incision. Pt has call light in reach and is using appropriately; safety maintained.    Problem: Pain  Goal: Verbalizes/displays adequate comfort level or baseline comfort level  Outcome: Progressing  Flowsheets  Taken 10/4/2024 2054  Verbalizes/displays adequate comfort level or baseline comfort level:   Encourage patient to monitor pain and request assistance   Assess pain using appropriate pain scale   Administer analgesics based on type and severity of pain and evaluate response   Notify Licensed Independent Practitioner if interventions unsuccessful or patient reports new pain  Patient having pain on their abdomen and rates it a 8. Pain interventions includefrequent position changes, medication, and medicate per physician recommendation/order. Patients goal for pain relief is 4. The need for pain and symptom management will be considered in the discharge planning process to ensure patients comfort.     Problem: Skin/Tissue Integrity - Adult  Goal: Incisions, wounds, or drain sites healing without S/S of infection  Outcome: Progressing  Flowsheets (Taken 10/4/2024  1945)  Incisions, Wounds, or Drain Sites Healing Without Sign and Symptoms of Infection: TWICE DAILY: Assess and document skin integrity     Problem: Anxiety  Goal: Will report anxiety at manageable levels  Outcome: Progressing  Flowsheets (Taken 10/4/2024 1945)  Will report anxiety at manageable levels:   Administer medication as ordered   Teach and rehearse alternative coping skills   Provide emotional support with 1:1 interaction with staff     Problem: Drug Abuse/Detox  Goal: Will have no detox symptoms and will verbalize plan for changing drug-related behavior  Outcome: Progressing  Flowsheets (Taken 10/4/2024 1945)  Will have no detox symptoms and will verbalize plan for changing drug-related behavior:   Monitor physical status   Provide emotional support with 1:1 interaction with staff   Encourage recovery focused treatment   Administer medication as ordered     Problem: Safety - Adult  Goal: Free from fall injury  Outcome: Progressing

## 2024-10-06 LAB
ANION GAP SERPL CALCULATED.3IONS-SCNC: 8 MMOL/L (ref 9–17)
BASOPHILS # BLD: 0.07 K/UL (ref 0–0.2)
BASOPHILS NFR BLD: 1 % (ref 0–2)
BUN SERPL-MCNC: 3 MG/DL (ref 6–20)
BUN/CREAT SERPL: 6 (ref 9–20)
CA-I BLD-SCNC: 1.24 MMOL/L (ref 1.13–1.33)
CALCIUM SERPL-MCNC: 8.8 MG/DL (ref 8.6–10.4)
CHLORIDE SERPL-SCNC: 101 MMOL/L (ref 98–107)
CO2 SERPL-SCNC: 30 MMOL/L (ref 20–31)
CREAT SERPL-MCNC: 0.5 MG/DL (ref 0.7–1.2)
EOSINOPHIL # BLD: 0.45 K/UL (ref 0–0.44)
EOSINOPHILS RELATIVE PERCENT: 3 % (ref 1–4)
ERYTHROCYTE [DISTWIDTH] IN BLOOD BY AUTOMATED COUNT: 13.8 % (ref 11.8–14.4)
GFR, ESTIMATED: >90 ML/MIN/1.73M2
GLUCOSE SERPL-MCNC: 97 MG/DL (ref 70–99)
HCT VFR BLD AUTO: 35.6 % (ref 40.7–50.3)
HGB BLD-MCNC: 12 G/DL (ref 13–17)
IMM GRANULOCYTES # BLD AUTO: 0.18 K/UL (ref 0–0.3)
IMM GRANULOCYTES NFR BLD: 1 %
LYMPHOCYTES NFR BLD: 1.41 K/UL (ref 1.1–3.7)
LYMPHOCYTES RELATIVE PERCENT: 11 % (ref 24–43)
MAGNESIUM SERPL-MCNC: 1.7 MG/DL (ref 1.6–2.6)
MCH RBC QN AUTO: 32.1 PG (ref 25.2–33.5)
MCHC RBC AUTO-ENTMCNC: 33.7 G/DL (ref 28.4–34.8)
MCV RBC AUTO: 95.2 FL (ref 82.6–102.9)
MONOCYTES NFR BLD: 1.14 K/UL (ref 0.1–1.2)
MONOCYTES NFR BLD: 9 % (ref 3–12)
NEUTROPHILS NFR BLD: 75 % (ref 36–65)
NEUTS SEG NFR BLD: 10.19 K/UL (ref 1.5–8.1)
NRBC BLD-RTO: 0 PER 100 WBC
PLATELET # BLD AUTO: 445 K/UL (ref 138–453)
PMV BLD AUTO: 9.7 FL (ref 8.1–13.5)
POTASSIUM SERPL-SCNC: 3.6 MMOL/L (ref 3.7–5.3)
RBC # BLD AUTO: 3.74 M/UL (ref 4.21–5.77)
SODIUM SERPL-SCNC: 139 MMOL/L (ref 135–144)
WBC OTHER # BLD: 13.4 K/UL (ref 3.5–11.3)

## 2024-10-06 PROCEDURE — 99232 SBSQ HOSP IP/OBS MODERATE 35: CPT | Performed by: FAMILY MEDICINE

## 2024-10-06 PROCEDURE — 2580000003 HC RX 258: Performed by: SURGERY

## 2024-10-06 PROCEDURE — 80048 BASIC METABOLIC PNL TOTAL CA: CPT

## 2024-10-06 PROCEDURE — 6370000000 HC RX 637 (ALT 250 FOR IP): Performed by: STUDENT IN AN ORGANIZED HEALTH CARE EDUCATION/TRAINING PROGRAM

## 2024-10-06 PROCEDURE — 83735 ASSAY OF MAGNESIUM: CPT

## 2024-10-06 PROCEDURE — 6370000000 HC RX 637 (ALT 250 FOR IP)

## 2024-10-06 PROCEDURE — 2500000003 HC RX 250 WO HCPCS: Performed by: NURSE PRACTITIONER

## 2024-10-06 PROCEDURE — 1200000000 HC SEMI PRIVATE

## 2024-10-06 PROCEDURE — 82330 ASSAY OF CALCIUM: CPT

## 2024-10-06 PROCEDURE — 6360000002 HC RX W HCPCS: Performed by: FAMILY MEDICINE

## 2024-10-06 PROCEDURE — 85025 COMPLETE CBC W/AUTO DIFF WBC: CPT

## 2024-10-06 PROCEDURE — 36415 COLL VENOUS BLD VENIPUNCTURE: CPT

## 2024-10-06 PROCEDURE — 6370000000 HC RX 637 (ALT 250 FOR IP): Performed by: NURSE PRACTITIONER

## 2024-10-06 PROCEDURE — 6360000002 HC RX W HCPCS: Performed by: SURGERY

## 2024-10-06 RX ORDER — METOPROLOL TARTRATE 1 MG/ML
5 INJECTION, SOLUTION INTRAVENOUS EVERY 4 HOURS PRN
Status: DISCONTINUED | OUTPATIENT
Start: 2024-10-06 | End: 2024-10-10 | Stop reason: HOSPADM

## 2024-10-06 RX ADMIN — SODIUM CHLORIDE, PRESERVATIVE FREE 40 MG: 5 INJECTION INTRAVENOUS at 09:21

## 2024-10-06 RX ADMIN — OXYCODONE HYDROCHLORIDE 10 MG: 5 TABLET ORAL at 21:23

## 2024-10-06 RX ADMIN — OXYCODONE HYDROCHLORIDE 10 MG: 5 TABLET ORAL at 13:20

## 2024-10-06 RX ADMIN — OXYCODONE HYDROCHLORIDE 10 MG: 5 TABLET ORAL at 00:58

## 2024-10-06 RX ADMIN — METRONIDAZOLE 500 MG: 500 INJECTION, SOLUTION INTRAVENOUS at 12:01

## 2024-10-06 RX ADMIN — METHOCARBAMOL 750 MG: 750 TABLET ORAL at 18:38

## 2024-10-06 RX ADMIN — CIPROFLOXACIN 200 MG: 2 INJECTION, SOLUTION INTRAVENOUS at 05:05

## 2024-10-06 RX ADMIN — LORAZEPAM 1 MG: 2 INJECTION INTRAMUSCULAR; INTRAVENOUS at 23:17

## 2024-10-06 RX ADMIN — METOPROLOL TARTRATE 5 MG: 5 INJECTION INTRAVENOUS at 00:24

## 2024-10-06 RX ADMIN — METHOCARBAMOL 750 MG: 750 TABLET ORAL at 13:19

## 2024-10-06 RX ADMIN — ACETAMINOPHEN 1000 MG: 500 TABLET ORAL at 06:47

## 2024-10-06 RX ADMIN — ENOXAPARIN SODIUM 40 MG: 100 INJECTION SUBCUTANEOUS at 09:19

## 2024-10-06 RX ADMIN — GABAPENTIN 300 MG: 300 CAPSULE ORAL at 16:37

## 2024-10-06 RX ADMIN — SODIUM CHLORIDE, PRESERVATIVE FREE 10 ML: 5 INJECTION INTRAVENOUS at 09:21

## 2024-10-06 RX ADMIN — CIPROFLOXACIN 200 MG: 2 INJECTION, SOLUTION INTRAVENOUS at 17:32

## 2024-10-06 RX ADMIN — SODIUM CHLORIDE, PRESERVATIVE FREE 10 ML: 5 INJECTION INTRAVENOUS at 19:44

## 2024-10-06 RX ADMIN — METHOCARBAMOL 750 MG: 750 TABLET ORAL at 06:47

## 2024-10-06 RX ADMIN — ACETAMINOPHEN 1000 MG: 500 TABLET ORAL at 16:37

## 2024-10-06 RX ADMIN — TRAZODONE HYDROCHLORIDE 100 MG: 100 TABLET ORAL at 01:39

## 2024-10-06 RX ADMIN — METRONIDAZOLE 500 MG: 500 INJECTION, SOLUTION INTRAVENOUS at 19:47

## 2024-10-06 RX ADMIN — ONDANSETRON 4 MG: 2 INJECTION, SOLUTION INTRAMUSCULAR; INTRAVENOUS at 19:43

## 2024-10-06 RX ADMIN — GABAPENTIN 300 MG: 300 CAPSULE ORAL at 06:47

## 2024-10-06 RX ADMIN — CIPROFLOXACIN 200 MG: 2 INJECTION, SOLUTION INTRAVENOUS at 18:40

## 2024-10-06 RX ADMIN — OXYCODONE HYDROCHLORIDE 10 MG: 5 TABLET ORAL at 17:26

## 2024-10-06 RX ADMIN — METHOCARBAMOL 750 MG: 750 TABLET ORAL at 00:58

## 2024-10-06 RX ADMIN — SODIUM CHLORIDE: 9 INJECTION, SOLUTION INTRAVENOUS at 12:01

## 2024-10-06 RX ADMIN — LORAZEPAM 1 MG: 2 INJECTION INTRAMUSCULAR; INTRAVENOUS at 06:51

## 2024-10-06 RX ADMIN — METRONIDAZOLE 500 MG: 500 INJECTION, SOLUTION INTRAVENOUS at 03:58

## 2024-10-06 RX ADMIN — ACETAMINOPHEN 1000 MG: 500 TABLET ORAL at 22:54

## 2024-10-06 RX ADMIN — CIPROFLOXACIN 200 MG: 2 INJECTION, SOLUTION INTRAVENOUS at 06:20

## 2024-10-06 RX ADMIN — OXYCODONE HYDROCHLORIDE 10 MG: 5 TABLET ORAL at 09:19

## 2024-10-06 RX ADMIN — GABAPENTIN 300 MG: 300 CAPSULE ORAL at 22:54

## 2024-10-06 RX ADMIN — OXYCODONE HYDROCHLORIDE 10 MG: 5 TABLET ORAL at 05:07

## 2024-10-06 RX ADMIN — SODIUM CHLORIDE: 9 INJECTION, SOLUTION INTRAVENOUS at 17:31

## 2024-10-06 RX ADMIN — LISINOPRIL 20 MG: 20 TABLET ORAL at 09:21

## 2024-10-06 ASSESSMENT — PAIN DESCRIPTION - ORIENTATION
ORIENTATION: MID
ORIENTATION: RIGHT;MID
ORIENTATION: MID
ORIENTATION: RIGHT;MID
ORIENTATION: MID
ORIENTATION: RIGHT;MID
ORIENTATION: MID
ORIENTATION: MID

## 2024-10-06 ASSESSMENT — PAIN DESCRIPTION - LOCATION
LOCATION: ABDOMEN
LOCATION: ABDOMEN
LOCATION: ABDOMEN;INCISION
LOCATION: ABDOMEN
LOCATION: ABDOMEN
LOCATION: ABDOMEN;INCISION
LOCATION: ABDOMEN
LOCATION: ABDOMEN;INCISION
LOCATION: ABDOMEN

## 2024-10-06 ASSESSMENT — PAIN DESCRIPTION - DESCRIPTORS
DESCRIPTORS: ACHING;SORE
DESCRIPTORS: SORE;ACHING
DESCRIPTORS: ACHING;THROBBING
DESCRIPTORS: ACHING;SORE
DESCRIPTORS: SORE;ACHING
DESCRIPTORS: SORE
DESCRIPTORS: SORE;ACHING
DESCRIPTORS: THROBBING;ACHING
DESCRIPTORS: ACHING;THROBBING
DESCRIPTORS: SORE;ACHING
DESCRIPTORS: SORE;ACHING

## 2024-10-06 ASSESSMENT — PAIN - FUNCTIONAL ASSESSMENT
PAIN_FUNCTIONAL_ASSESSMENT: PREVENTS OR INTERFERES SOME ACTIVE ACTIVITIES AND ADLS
PAIN_FUNCTIONAL_ASSESSMENT: PREVENTS OR INTERFERES SOME ACTIVE ACTIVITIES AND ADLS
PAIN_FUNCTIONAL_ASSESSMENT: ACTIVITIES ARE NOT PREVENTED
PAIN_FUNCTIONAL_ASSESSMENT: PREVENTS OR INTERFERES SOME ACTIVE ACTIVITIES AND ADLS
PAIN_FUNCTIONAL_ASSESSMENT: PREVENTS OR INTERFERES SOME ACTIVE ACTIVITIES AND ADLS

## 2024-10-06 ASSESSMENT — PAIN SCALES - GENERAL
PAINLEVEL_OUTOF10: 7
PAINLEVEL_OUTOF10: 8
PAINLEVEL_OUTOF10: 7
PAINLEVEL_OUTOF10: 8
PAINLEVEL_OUTOF10: 8
PAINLEVEL_OUTOF10: 6
PAINLEVEL_OUTOF10: 7
PAINLEVEL_OUTOF10: 8
PAINLEVEL_OUTOF10: 5
PAINLEVEL_OUTOF10: 8
PAINLEVEL_OUTOF10: 7
PAINLEVEL_OUTOF10: 8
PAINLEVEL_OUTOF10: 7
PAINLEVEL_OUTOF10: 8
PAINLEVEL_OUTOF10: 8
PAINLEVEL_OUTOF10: 7
PAINLEVEL_OUTOF10: 6
PAINLEVEL_OUTOF10: 8
PAINLEVEL_OUTOF10: 7

## 2024-10-06 NOTE — PROGRESS NOTES
[facility]  General Surgery  Consult Progress Note    Service date: 10/6/2024, 7:32 AM  Room : [facility]/1004-02   Name: Abdifatah De Anda  MRN: [id number]    Length of stay: 5 day(s)    OR date: 10/2/2024  Procedure(s):  LAPAROTOMY EXPLORATORY DESCENDING COLOSTOMY, SIGMOID RESECTION, drainage of pelvic abscess, tap block  POD#4    Subjective   Interval update:   -Overall, patient is doing well, however does complain about pain after diet last night.  Discussed with patient that regular diet can stay on that he can order clears through that if needed.  Patient producing thicker stool through ostomy with active flatus on exam.  Abdominal tenderness seems decreased from prior.  Patient does not report nausea/ vomiting, fever/chills, chest pain, shortness of breath, or other symptoms.      Objective   Physical exam: BP (!) 150/94   Pulse 85   Temp 98.6 °F (37 °C) (Oral)   Resp 15   Wt 72.8 kg (160 lb 6.4 oz)   SpO2 90%   BMI 26.69 kg/m²     General: Patient is resting comfortably on exam, not in any distress and is pleasant and cooperative.   Neuro: alert and oriented x3   Abdomen: soft, tender to palpation around midline incision, non distended. Incision: Clean, dry, intact. No erythema noted. Stoma is appears pink this morning and producing only bowel sweat at present along with active flatus. GALDINO drain in place    Chest: Non-labored, symmetrical respirations.   CVS: Pulse      Date 10/06/24 0000 - 10/06/24 2359   Shift 0798-2020 2823-1796 8808-1530 24 Hour Total   INTAKE   I.V.(mL/kg) 344(4.7)   344(4.7)   IV Piggyback(mL/kg) 664.3(9.1)   664.3(9.1)   Shift Total(mL/kg) 1008.3(13.9)   1008.3(13.9)   OUTPUT   Drains(mL/kg) 25(0.3)   25(0.3)   Stool(mL/kg) 100(1.4)   100(1.4)   Shift Total(mL/kg) 125(1.7)   125(1.7)   Weight (kg) 72.8 72.8 72.8 72.8         Medications:    Current Facility-Administered Medications   Medication Dose Route Frequency Provider Last Rate Last Admin    metoprolol (LOPRESSOR)

## 2024-10-06 NOTE — PLAN OF CARE
Abdifatah resting on and off this shift with report of improving pain. Have given PRN and scheduled interventions and he reports PO medications are having better effect than IV meds as they last longer. He also reports that pain is improving, especially at rest. He continues to ambulate frequently. Nicotine patch DC'd, and new order for PRN Metoprolol for intermittent HTN, administered for SBP >150. He denies nausea this shift. Pt was tearful at beginning of shift and spent some time speaking with patient. Gave PRN Ativan to good effect. Reviewed plan for home as he reports he has some anxiety about it, and he is agreeable to home care as he adjusts. Pt is afebrile on ABT therapy for intra-abdominal infection with no s/s of adverse reaction noted. Colostomy is in tact with minimal drainage and bowel sounds are active. GALDINO is draining serosanguineous drainage at this time. Dressing in tact to midline incision. Pt has call light in reach and is using appropriately; safety maintained.     Problem: Pain  Goal: Verbalizes/displays adequate comfort level or baseline comfort level  10/6/2024 0212 by Naz Healy, RN  Outcome: Progressing  Flowsheets (Taken 10/5/2024 2057)  Verbalizes/displays adequate comfort level or baseline comfort level:   Encourage patient to monitor pain and request assistance   Assess pain using appropriate pain scale   Administer analgesics based on type and severity of pain and evaluate response  Patient having pain on their abdomen and rates it a 8. Pain interventions include medication and diversional activities. Patients goal for pain relief is 5. The need for pain and symptom management will be considered in the discharge planning process to ensure patients comfort.      Problem: Respiratory - Adult  Goal: Achieves optimal ventilation and oxygenation  10/6/2024 0212 by Naz Healy, RN  Outcome: Progressing  Flowsheets (Taken 10/5/2024 2000)  Achieves optimal ventilation and oxygenation:   Assess  for changes in respiratory status   Assess for changes in mentation and behavior   Position to facilitate oxygenation and minimize respiratory effort     Problem: Cardiovascular - Adult  Goal: Maintains optimal cardiac output and hemodynamic stability  10/6/2024 0212 by Naz Healy RN  Outcome: Progressing  Flowsheets (Taken 10/5/2024 2000)  Maintains optimal cardiac output and hemodynamic stability:   Monitor blood pressure and heart rate   Assess for signs of decreased cardiac output    Problem: Gastrointestinal - Adult  Goal: Minimal or absence of nausea and vomiting  10/6/2024 0212 by Naz Healy RN  Outcome: Progressing  Flowsheets (Taken 10/5/2024 2000)  Minimal or absence of nausea and vomiting:   Administer ordered antiemetic medications as needed   Provide nonpharmacologic comfort measures as appropriate      Problem: Gastrointestinal - Adult  Goal: Establish and maintain optimal ostomy function  10/6/2024 0212 by Naz Healy RN  Outcome: Progressing  Flowsheets (Taken 10/5/2024 2000)  Establish and maintain optimal ostomy function: Monitor output from ostomies     Problem: Skin/Tissue Integrity - Adult  Goal: Incisions, wounds, or drain sites healing without S/S of infection  10/6/2024 0212 by Naz Healy RN  Outcome: Progressing  Flowsheets (Taken 10/5/2024 2000)  Incisions, Wounds, or Drain Sites Healing Without Sign and Symptoms of Infection: ADMISSION and DAILY: Assess and document risk factors for pressure ulcer development     Problem: Anxiety  Goal: Will report anxiety at manageable levels  Outcome: Progressing  Flowsheets (Taken 10/5/2024 2000)  Will report anxiety at manageable levels:   Administer medication as ordered   Teach and rehearse alternative coping skills   Provide emotional support with 1:1 interaction with staff     Problem: Drug Abuse/Detox  Goal: Will have no detox symptoms and will verbalize plan for changing drug-related behavior  Outcome: Progressing  Flowsheets

## 2024-10-06 NOTE — PLAN OF CARE
Pt A&Ox4 during shift has remained free from falls and injuries, independent and calls out appropriately when needing assistance.   Vital signs stable during shift normal sinus and sinus tach on ambulation   Patient having pain on their abdomen and rates it a 8. Pain interventions includeregular rest periods, medicate per physician recommendation/order, and splinting abdomen incision . Patients goal for pain relief is 3. The need for pain and symptom management will be considered in the discharge planning process to ensure patients comfort.    Pt had 90 mL out from GALDINO drain during shift   250 mL stool out from colostomy during shift soft mucous brown on assessment, Colostomy bag changed during shift due to leaking noted   Pt reports continuing to have decreased appetite attempted to eat what he could during meals today. No complaints of nausea during this shift.   Pt having intermittent Antibiotic infusions.   All questions answered and safety maintained.   Problem: Discharge Planning  Goal: Discharge to home or other facility with appropriate resources  Outcome: Progressing     Problem: Safety - Adult  Goal: Free from fall injury  Outcome: Progressing     Problem: Pain  Goal: Verbalizes/displays adequate comfort level or baseline comfort level  Outcome: Progressing     Problem: Respiratory - Adult  Goal: Achieves optimal ventilation and oxygenation  Outcome: Progressing     Problem: Cardiovascular - Adult  Goal: Maintains optimal cardiac output and hemodynamic stability  Outcome: Progressing     Problem: Gastrointestinal - Adult  Goal: Minimal or absence of nausea and vomiting  Outcome: Progressing     Problem: Gastrointestinal - Adult  Goal: Maintains or returns to baseline bowel function  Outcome: Progressing     Problem: Gastrointestinal - Adult  Goal: Establish and maintain optimal ostomy function  Outcome: Progressing     Problem: ABCDS Injury Assessment  Goal: Absence of physical injury  Outcome:

## 2024-10-06 NOTE — PROGRESS NOTES
[facility]  Office: [phone]  Fady Flores, DO, Chris Medina, DO, Nick Yarbrough DO, Gary Wei, DO, Aileen Lloyd MD, Marie Astorga MD, Jacob Kelly MD, Zaira Estrella MD,  German Ramos MD, Vi Castillo MD, Sigifredo Schwartz MD,  Denice Streeter DO, Sebastián Suggs MD, Tye Boateng MD, Stan Flores DO, Nida Wilhelm MD,  Damir Herrera DO, Mallory Brasher MD, Rosana Escalante MD, Conchis Jiménez MD, Salazar Trinh MD,  Santos Rai MD, Jonna Delgado MD, Raymundo Ambrocio MD, Silver Fisher MD, Angel Dhaliwal MD, Michael Lewis MD, Maxwell Sifuentes, DO, Camron Salas DO, Dillon Brizuela DO, Delvin Leija MD, Shirley Waterhouse, CNP,  Kasie Early CNP, Maxwell Lugo, CNP,  Desiree Hannah, [facility], Stacie Moss, CNP, Rosangela Kennedy, CNP, Lauren Franco, CNP, Layla Fritz, CNP, Leandra Vega, PA-C, Ivory Cool, PA-C, Taina Cunningham, CNP, Denys Pond, CNP,  Anusha Romero, CNP, Twila Montgomery, CNP, Gretchen Rudolph, CNP, Maria De Jesus Sebastian, CNS, Michelle Thapa, CNP, Dulce Sky, CNP, Tracy Schwab, CNP       [facility]      Daily Progress Note     Admit Date: 10/1/2024  Bed/Room No.  1004/1004-02  Admitting Physician : Jacob Kelly MD  Code Status :Full Code  Hospital Day:  LOS: 5 days   Chief Complaint:     Chief Complaint   Patient presents with    Abdominal Pain     Pt was d/c from facility at 1300 today, returned back to facility 1500 as he began to feel worse again with his symptoms. Patient reports he is unable to eat. Denies having a BM. Denies urination complications.      Principal Problem:    Perforation of sigmoid colon due to diverticulitis  Active Problems:    Ankylosing spondylitis (HCC)    Arthritis, rheumatoid (HCC)    Alcohol abuse    Benign essential hypertension    Intra-abdominal free air of unknown etiology  Resolved Problems:    * No resolved hospital problems. *    Subjective :        Interval History/Significant events :   MD Robin  10/6/2024

## 2024-10-07 ENCOUNTER — APPOINTMENT (OUTPATIENT)
Dept: CT IMAGING | Age: 39
End: 2024-10-07
Payer: COMMERCIAL

## 2024-10-07 PROBLEM — K65.1 INTRA-ABDOMINAL ABSCESS (HCC): Status: ACTIVE | Noted: 2024-10-07

## 2024-10-07 LAB
ANION GAP SERPL CALCULATED.3IONS-SCNC: 12 MMOL/L (ref 9–17)
BASOPHILS # BLD: 0.05 K/UL (ref 0–0.2)
BASOPHILS NFR BLD: 0 % (ref 0–2)
BUN SERPL-MCNC: 3 MG/DL (ref 6–20)
BUN/CREAT SERPL: 6 (ref 9–20)
CA-I BLD-SCNC: 1.22 MMOL/L (ref 1.13–1.33)
CALCIUM SERPL-MCNC: 8.3 MG/DL (ref 8.6–10.4)
CHLORIDE SERPL-SCNC: 99 MMOL/L (ref 98–107)
CO2 SERPL-SCNC: 27 MMOL/L (ref 20–31)
CREAT SERPL-MCNC: 0.5 MG/DL (ref 0.7–1.2)
EOSINOPHIL # BLD: 0.42 K/UL (ref 0–0.44)
EOSINOPHILS RELATIVE PERCENT: 3 % (ref 1–4)
ERYTHROCYTE [DISTWIDTH] IN BLOOD BY AUTOMATED COUNT: 13.8 % (ref 11.8–14.4)
GFR, ESTIMATED: >90 ML/MIN/1.73M2
GLUCOSE SERPL-MCNC: 88 MG/DL (ref 70–99)
HCT VFR BLD AUTO: 32.4 % (ref 40.7–50.3)
HGB BLD-MCNC: 11 G/DL (ref 13–17)
IMM GRANULOCYTES # BLD AUTO: 0.18 K/UL (ref 0–0.3)
IMM GRANULOCYTES NFR BLD: 1 %
LYMPHOCYTES NFR BLD: 1.35 K/UL (ref 1.1–3.7)
LYMPHOCYTES RELATIVE PERCENT: 8 % (ref 24–43)
MAGNESIUM SERPL-MCNC: 1.7 MG/DL (ref 1.6–2.6)
MCH RBC QN AUTO: 32.1 PG (ref 25.2–33.5)
MCHC RBC AUTO-ENTMCNC: 34 G/DL (ref 28.4–34.8)
MCV RBC AUTO: 94.5 FL (ref 82.6–102.9)
MICROORGANISM SPEC CULT: ABNORMAL
MICROORGANISM SPEC CULT: ABNORMAL
MICROORGANISM SPEC CULT: NORMAL
MICROORGANISM SPEC CULT: NORMAL
MICROORGANISM/AGENT SPEC: ABNORMAL
MICROORGANISM/AGENT SPEC: ABNORMAL
MONOCYTES NFR BLD: 1.14 K/UL (ref 0.1–1.2)
MONOCYTES NFR BLD: 7 % (ref 3–12)
NEUTROPHILS NFR BLD: 81 % (ref 36–65)
NEUTS SEG NFR BLD: 13.98 K/UL (ref 1.5–8.1)
NRBC BLD-RTO: 0 PER 100 WBC
PLATELET # BLD AUTO: 428 K/UL (ref 138–453)
PMV BLD AUTO: 9.6 FL (ref 8.1–13.5)
POTASSIUM SERPL-SCNC: 3.7 MMOL/L (ref 3.7–5.3)
RBC # BLD AUTO: 3.43 M/UL (ref 4.21–5.77)
SERVICE CMNT-IMP: ABNORMAL
SERVICE CMNT-IMP: NORMAL
SERVICE CMNT-IMP: NORMAL
SODIUM SERPL-SCNC: 138 MMOL/L (ref 135–144)
SPECIMEN DESCRIPTION: ABNORMAL
SPECIMEN DESCRIPTION: NORMAL
SPECIMEN DESCRIPTION: NORMAL
WBC OTHER # BLD: 17.1 K/UL (ref 3.5–11.3)

## 2024-10-07 PROCEDURE — 2580000003 HC RX 258: Performed by: SURGERY

## 2024-10-07 PROCEDURE — 74177 CT ABD & PELVIS W/CONTRAST: CPT

## 2024-10-07 PROCEDURE — 6360000002 HC RX W HCPCS: Performed by: FAMILY MEDICINE

## 2024-10-07 PROCEDURE — 99213 OFFICE O/P EST LOW 20 MIN: CPT

## 2024-10-07 PROCEDURE — 83735 ASSAY OF MAGNESIUM: CPT

## 2024-10-07 PROCEDURE — 6360000002 HC RX W HCPCS: Performed by: SURGERY

## 2024-10-07 PROCEDURE — 6360000004 HC RX CONTRAST MEDICATION: Performed by: STUDENT IN AN ORGANIZED HEALTH CARE EDUCATION/TRAINING PROGRAM

## 2024-10-07 PROCEDURE — 2580000003 HC RX 258: Performed by: STUDENT IN AN ORGANIZED HEALTH CARE EDUCATION/TRAINING PROGRAM

## 2024-10-07 PROCEDURE — 2580000003 HC RX 258: Performed by: INTERNAL MEDICINE

## 2024-10-07 PROCEDURE — 6370000000 HC RX 637 (ALT 250 FOR IP): Performed by: STUDENT IN AN ORGANIZED HEALTH CARE EDUCATION/TRAINING PROGRAM

## 2024-10-07 PROCEDURE — 6360000002 HC RX W HCPCS: Performed by: INTERNAL MEDICINE

## 2024-10-07 PROCEDURE — 6370000000 HC RX 637 (ALT 250 FOR IP): Performed by: NURSE PRACTITIONER

## 2024-10-07 PROCEDURE — 1200000000 HC SEMI PRIVATE

## 2024-10-07 PROCEDURE — 99223 1ST HOSP IP/OBS HIGH 75: CPT | Performed by: INTERNAL MEDICINE

## 2024-10-07 PROCEDURE — 85025 COMPLETE CBC W/AUTO DIFF WBC: CPT

## 2024-10-07 PROCEDURE — 36415 COLL VENOUS BLD VENIPUNCTURE: CPT

## 2024-10-07 PROCEDURE — 99232 SBSQ HOSP IP/OBS MODERATE 35: CPT | Performed by: FAMILY MEDICINE

## 2024-10-07 PROCEDURE — 80048 BASIC METABOLIC PNL TOTAL CA: CPT

## 2024-10-07 PROCEDURE — 6370000000 HC RX 637 (ALT 250 FOR IP)

## 2024-10-07 PROCEDURE — 82330 ASSAY OF CALCIUM: CPT

## 2024-10-07 RX ORDER — LISINOPRIL 40 MG/1
40 TABLET ORAL DAILY
Status: DISCONTINUED | OUTPATIENT
Start: 2024-10-08 | End: 2024-10-10 | Stop reason: HOSPADM

## 2024-10-07 RX ORDER — CYCLOBENZAPRINE HCL 10 MG
10 TABLET ORAL 3 TIMES DAILY
Status: DISCONTINUED | OUTPATIENT
Start: 2024-10-07 | End: 2024-10-10 | Stop reason: HOSPADM

## 2024-10-07 RX ORDER — IOPAMIDOL 755 MG/ML
75 INJECTION, SOLUTION INTRAVASCULAR
Status: COMPLETED | OUTPATIENT
Start: 2024-10-07 | End: 2024-10-07

## 2024-10-07 RX ORDER — SODIUM CHLORIDE 0.9 % (FLUSH) 0.9 %
10 SYRINGE (ML) INJECTION PRN
Status: DISCONTINUED | OUTPATIENT
Start: 2024-10-07 | End: 2024-10-10 | Stop reason: HOSPADM

## 2024-10-07 RX ORDER — 0.9 % SODIUM CHLORIDE 0.9 %
80 INTRAVENOUS SOLUTION INTRAVENOUS ONCE
Status: COMPLETED | OUTPATIENT
Start: 2024-10-07 | End: 2024-10-07

## 2024-10-07 RX ORDER — IBUPROFEN 400 MG/1
400 TABLET, FILM COATED ORAL EVERY 6 HOURS
Status: DISCONTINUED | OUTPATIENT
Start: 2024-10-07 | End: 2024-10-10 | Stop reason: HOSPADM

## 2024-10-07 RX ORDER — FLUCONAZOLE 2 MG/ML
400 INJECTION, SOLUTION INTRAVENOUS EVERY 24 HOURS
Status: DISCONTINUED | OUTPATIENT
Start: 2024-10-07 | End: 2024-10-08

## 2024-10-07 RX ADMIN — GABAPENTIN 300 MG: 300 CAPSULE ORAL at 23:01

## 2024-10-07 RX ADMIN — OXYCODONE HYDROCHLORIDE 10 MG: 5 TABLET ORAL at 05:53

## 2024-10-07 RX ADMIN — METHOCARBAMOL 750 MG: 750 TABLET ORAL at 01:20

## 2024-10-07 RX ADMIN — LORAZEPAM 1 MG: 2 INJECTION INTRAMUSCULAR; INTRAVENOUS at 17:40

## 2024-10-07 RX ADMIN — WATER 1000 MG: 1 INJECTION INTRAMUSCULAR; INTRAVENOUS; SUBCUTANEOUS at 13:01

## 2024-10-07 RX ADMIN — IOPAMIDOL 75 ML: 755 INJECTION, SOLUTION INTRAVENOUS at 08:16

## 2024-10-07 RX ADMIN — GABAPENTIN 300 MG: 300 CAPSULE ORAL at 16:30

## 2024-10-07 RX ADMIN — SODIUM CHLORIDE, PRESERVATIVE FREE 40 MG: 5 INJECTION INTRAVENOUS at 12:06

## 2024-10-07 RX ADMIN — GABAPENTIN 300 MG: 300 CAPSULE ORAL at 05:53

## 2024-10-07 RX ADMIN — SODIUM CHLORIDE 80 ML: 9 INJECTION, SOLUTION INTRAVENOUS at 08:15

## 2024-10-07 RX ADMIN — LISINOPRIL 20 MG: 20 TABLET ORAL at 09:02

## 2024-10-07 RX ADMIN — ENOXAPARIN SODIUM 40 MG: 100 INJECTION SUBCUTANEOUS at 09:02

## 2024-10-07 RX ADMIN — TRAZODONE HYDROCHLORIDE 100 MG: 100 TABLET ORAL at 01:20

## 2024-10-07 RX ADMIN — SODIUM CHLORIDE, PRESERVATIVE FREE 10 ML: 5 INJECTION INTRAVENOUS at 08:16

## 2024-10-07 RX ADMIN — CIPROFLOXACIN 200 MG: 2 INJECTION, SOLUTION INTRAVENOUS at 06:31

## 2024-10-07 RX ADMIN — FLUCONAZOLE 400 MG: 400 INJECTION, SOLUTION INTRAVENOUS at 14:15

## 2024-10-07 RX ADMIN — IBUPROFEN 400 MG: 400 TABLET, FILM COATED ORAL at 09:02

## 2024-10-07 RX ADMIN — ACETAMINOPHEN 1000 MG: 500 TABLET ORAL at 16:30

## 2024-10-07 RX ADMIN — SODIUM CHLORIDE, PRESERVATIVE FREE 10 ML: 5 INJECTION INTRAVENOUS at 20:11

## 2024-10-07 RX ADMIN — ACETAMINOPHEN 1000 MG: 500 TABLET ORAL at 23:01

## 2024-10-07 RX ADMIN — OXYCODONE HYDROCHLORIDE 10 MG: 5 TABLET ORAL at 20:10

## 2024-10-07 RX ADMIN — METRONIDAZOLE 500 MG: 500 INJECTION, SOLUTION INTRAVENOUS at 13:10

## 2024-10-07 RX ADMIN — SODIUM CHLORIDE, PRESERVATIVE FREE 10 ML: 5 INJECTION INTRAVENOUS at 12:08

## 2024-10-07 RX ADMIN — CYCLOBENZAPRINE 10 MG: 10 TABLET, FILM COATED ORAL at 20:10

## 2024-10-07 RX ADMIN — IBUPROFEN 400 MG: 400 TABLET, FILM COATED ORAL at 14:05

## 2024-10-07 RX ADMIN — METRONIDAZOLE 500 MG: 500 INJECTION, SOLUTION INTRAVENOUS at 04:26

## 2024-10-07 RX ADMIN — CYCLOBENZAPRINE 10 MG: 10 TABLET, FILM COATED ORAL at 14:05

## 2024-10-07 RX ADMIN — METHOCARBAMOL 750 MG: 750 TABLET ORAL at 05:53

## 2024-10-07 RX ADMIN — METRONIDAZOLE 500 MG: 500 INJECTION, SOLUTION INTRAVENOUS at 21:03

## 2024-10-07 RX ADMIN — IBUPROFEN 400 MG: 400 TABLET, FILM COATED ORAL at 21:04

## 2024-10-07 RX ADMIN — CIPROFLOXACIN 200 MG: 2 INJECTION, SOLUTION INTRAVENOUS at 05:29

## 2024-10-07 RX ADMIN — OXYCODONE HYDROCHLORIDE 10 MG: 5 TABLET ORAL at 01:20

## 2024-10-07 RX ADMIN — OXYCODONE HYDROCHLORIDE 10 MG: 5 TABLET ORAL at 10:32

## 2024-10-07 RX ADMIN — ACETAMINOPHEN 1000 MG: 500 TABLET ORAL at 05:53

## 2024-10-07 RX ADMIN — OXYCODONE HYDROCHLORIDE 10 MG: 5 TABLET ORAL at 15:41

## 2024-10-07 RX ADMIN — CYCLOBENZAPRINE 10 MG: 10 TABLET, FILM COATED ORAL at 09:02

## 2024-10-07 ASSESSMENT — PAIN DESCRIPTION - ORIENTATION
ORIENTATION: RIGHT;MID
ORIENTATION: MID
ORIENTATION: RIGHT;LEFT
ORIENTATION: MID
ORIENTATION: MID
ORIENTATION: RIGHT;LEFT
ORIENTATION: MID
ORIENTATION: RIGHT;LEFT
ORIENTATION: MID
ORIENTATION: MID
ORIENTATION: RIGHT

## 2024-10-07 ASSESSMENT — PAIN SCALES - GENERAL
PAINLEVEL_OUTOF10: 6
PAINLEVEL_OUTOF10: 8
PAINLEVEL_OUTOF10: 4
PAINLEVEL_OUTOF10: 7
PAINLEVEL_OUTOF10: 6
PAINLEVEL_OUTOF10: 6
PAINLEVEL_OUTOF10: 8
PAINLEVEL_OUTOF10: 7
PAINLEVEL_OUTOF10: 7
PAINLEVEL_OUTOF10: 6
PAINLEVEL_OUTOF10: 5
PAINLEVEL_OUTOF10: 7
PAINLEVEL_OUTOF10: 7
PAINLEVEL_OUTOF10: 8
PAINLEVEL_OUTOF10: 7

## 2024-10-07 ASSESSMENT — PAIN DESCRIPTION - DESCRIPTORS
DESCRIPTORS: ACHING;CRAMPING
DESCRIPTORS: ACHING;THROBBING
DESCRIPTORS: ACHING;THROBBING
DESCRIPTORS: ACHING;CRAMPING
DESCRIPTORS: ACHING
DESCRIPTORS: ACHING;CRAMPING
DESCRIPTORS: ACHING
DESCRIPTORS: ACHING;SORE
DESCRIPTORS: ACHING;SORE

## 2024-10-07 ASSESSMENT — PAIN - FUNCTIONAL ASSESSMENT
PAIN_FUNCTIONAL_ASSESSMENT: PREVENTS OR INTERFERES SOME ACTIVE ACTIVITIES AND ADLS
PAIN_FUNCTIONAL_ASSESSMENT: ACTIVITIES ARE NOT PREVENTED
PAIN_FUNCTIONAL_ASSESSMENT: PREVENTS OR INTERFERES SOME ACTIVE ACTIVITIES AND ADLS
PAIN_FUNCTIONAL_ASSESSMENT: ACTIVITIES ARE NOT PREVENTED

## 2024-10-07 ASSESSMENT — PAIN DESCRIPTION - LOCATION
LOCATION: ABDOMEN;INCISION
LOCATION: ABDOMEN
LOCATION: ABDOMEN
LOCATION: ABDOMEN;INCISION
LOCATION: ABDOMEN

## 2024-10-07 ASSESSMENT — ENCOUNTER SYMPTOMS
ABDOMINAL PAIN: 1
VOICE CHANGE: 0
WHEEZING: 0
VOMITING: 0
CHEST TIGHTNESS: 0
SINUS PRESSURE: 0
CHOKING: 0
COUGH: 0
DIARRHEA: 0
SHORTNESS OF BREATH: 0
NAUSEA: 0
BACK PAIN: 0
CONSTIPATION: 0
RHINORRHEA: 0

## 2024-10-07 NOTE — PLAN OF CARE
Pt alert and oriented x4, currently room air. Ostomy and GALDINO drain in place. ID consulted today, IV antibiotics changed to IV rocephin, flagyl and diflucan. CT of abdomen completed today, results sent to surgery resident. Pt up independently. Safety maintained throughout shift.   Patient having pain on their abdomen and rates it a 8. Pain interventions includerelaxation techniques and medication. Patients goal for pain relief is 1. The need for pain and symptom management will be considered in the discharge planning process to ensure patients comfort.   Problem: Discharge Planning  Goal: Discharge to home or other facility with appropriate resources  Outcome: Progressing     Problem: Safety - Adult  Goal: Free from fall injury  10/7/2024 1552 by Genesis Pelaez RN  Outcome: Progressing     Problem: Pain  Goal: Verbalizes/displays adequate comfort level or baseline comfort level  10/7/2024 1552 by Genesis Pelaez RN  Outcome: Progressing     Problem: Respiratory - Adult  Goal: Achieves optimal ventilation and oxygenation  Outcome: Progressing     Problem: Cardiovascular - Adult  Goal: Maintains optimal cardiac output and hemodynamic stability  Outcome: Progressing     Problem: Gastrointestinal - Adult  Goal: Minimal or absence of nausea and vomiting  Outcome: Progressing     Problem: Gastrointestinal - Adult  Goal: Maintains or returns to baseline bowel function  Outcome: Progressing     Problem: Gastrointestinal - Adult  Goal: Establish and maintain optimal ostomy function  10/7/2024 1552 by Genesis Pelaez RN  Outcome: Progressing     Problem: ABCDS Injury Assessment  Goal: Absence of physical injury  Outcome: Progressing     Problem: Nutrition Deficit:  Goal: Optimize nutritional status  Outcome: Progressing     Problem: Skin/Tissue Integrity - Adult  Goal: Incisions, wounds, or drain sites healing without S/S of infection  10/7/2024 1552 by Genesis Pelaez RN  Outcome: Progressing     Problem: Anxiety  Goal: Will

## 2024-10-07 NOTE — PLAN OF CARE
Abdifatah resting well at this time with report of slowly improving pain. Have given PRN and scheduled interventions. He continues to ambulate frequently and is NSR on telemetry with tachycardia noted with activity. He had one episode of n/v with 200mL green mucous noted. Pt is afebrile on ABT therapy for intra-abdominal infection with no s/s of adverse reaction noted. Colostomy is in tact with minimal drainage and bowel sounds are active. GALDINO is draining serosanguineous drainage at this time. Dressing in tact to midline incision. Pt has call light in reach and is using appropriately; safety maintained.     Problem: Pain  Goal: Verbalizes/displays adequate comfort level or baseline comfort level  10/7/2024 0304 by Naz Healy RN  Outcome: Progressing  Flowsheets (Taken 10/6/2024 2123)  Verbalizes/displays adequate comfort level or baseline comfort level:   Encourage patient to monitor pain and request assistance   Assess pain using appropriate pain scale   Administer analgesics based on type and severity of pain and evaluate response     Problem: Gastrointestinal - Adult  Goal: Establish and maintain optimal ostomy function  10/7/2024 0304 by Naz Healy RN  Outcome: Progressing  Flowsheets (Taken 10/6/2024 1945)  Establish and maintain optimal ostomy function: Monitor output from ostomies     Problem: Skin/Tissue Integrity - Adult  Goal: Incisions, wounds, or drain sites healing without S/S of infection  10/7/2024 0304 by Naz Healy, RN  Outcome: Progressing  Flowsheets (Taken 10/6/2024 1945)  Incisions, Wounds, or Drain Sites Healing Without Sign and Symptoms of Infection: ADMISSION and DAILY: Assess and document risk factors for pressure ulcer development     Problem: Anxiety  Goal: Will report anxiety at manageable levels  Outcome: Progressing  Flowsheets (Taken 10/6/2024 1945)  Will report anxiety at manageable levels:   Administer medication as ordered   Teach and rehearse alternative coping skills

## 2024-10-07 NOTE — PROGRESS NOTES
[facility]  Office: [phone]  Fady Flores, DO, Chris Medina, DO, Nick Yarbrough DO, Gary Wei, DO, Aileen Lloyd MD, Marie Astorga MD, Jacob Kelly MD, Zaira Estrella MD,  German Ramos MD, Vi Castillo MD, Sigifredo Schwartz MD,  Denice Streeter DO, Sebastián Suggs MD, Tye Boateng MD, Stan Flores DO, Nida Wilhelm MD,  Damir Herrera DO, Mallory Brasher MD, Rosana sEcalante MD, Conchis Jiménez MD, Salazar Trinh MD,  Santos Rai MD, Jonna Delgado MD, Raymundo Ambrocio MD, Silver Fisher MD, Angel Dhaliwal MD, Michael Lewis MD, Maxwell Sifuentes, DO, Camron Salas DO, Dillon Brizuela DO, Delvin Leija MD, Shirley Waterhouse, CNP,  Kasie Early CNP, Maxwell Lugo, CNP,  Desiree Hannah, [facility], Stacie Moss, CNP, Rosangela Kennedy, CNP, Lauren Franco, CNP, Layla Fritz, CNP, Leandra Vega, PA-C, Ivory Cool, PA-C, Taina Cunningham, CNP, Denys Pond, CNP,  Anusha Romero, CNP, Twila Montgomery, CNP, Gretchen Rudolph, CNP, Maria De Jesus Sebastian, CNS, Michelle Thapa, [facility], Dulce Sky, CNP, Tracy Schwab, CNP       [facility]      Daily Progress Note     Admit Date: 10/1/2024  Bed/Room No.  1004/1004-02  Admitting Physician : Jacob Kelly MD  Code Status :Full Code  Hospital Day:  LOS: 6 days   Chief Complaint:     Chief Complaint   Patient presents with    Abdominal Pain     Pt was d/c from facility at 1300 today, returned back to facility 1500 as he began to feel worse again with his symptoms. Patient reports he is unable to eat. Denies having a BM. Denies urination complications.      Principal Problem:    Perforation of sigmoid colon due to diverticulitis  Active Problems:    Ankylosing spondylitis (HCC)    Arthritis, rheumatoid (HCC)    Alcohol abuse    Benign essential hypertension    Intra-abdominal free air of unknown etiology  Resolved Problems:    * No resolved hospital problems. *    Subjective :        Interval History/Significant events :   Normal heart sounds. No murmur heard.  Pulmonary:      Effort: Pulmonary effort is normal.      Breath sounds: Normal breath sounds. No wheezing or rales.   Abdominal:      Palpations: Abdomen is soft. There is no mass.      Tenderness: There is generalized abdominal tenderness.      Comments: Colostomy with liquid bloody drainage .  GALDINO drain with serous fluid in place   Musculoskeletal:      Cervical back: Full passive range of motion without pain and neck supple.   Lymphadenopathy:      Head:      Right side of head: No submandibular adenopathy.      Left side of head: No submandibular adenopathy.      Cervical: No cervical adenopathy.   Skin:     General: Skin is warm.   Neurological:      Mental Status: He is alert and oriented to person, place, and time.      Motor: No tremor.   Psychiatric:         Behavior: Behavior is cooperative.           Laboratory findings:    Recent Labs     10/05/24  0449 10/06/24  0511 10/07/24  0529   WBC 14.2* 13.4* 17.1*   HGB 12.1* 12.0* 11.0*   HCT 36.1* 35.6* 32.4*    445 428     Recent Labs     10/05/24  0449 10/06/24  0511 10/07/24  0529    139 138   K 4.0 3.6* 3.7    101 99   CO2 28 30 27   GLUCOSE 100* 97 88   BUN 2* 3* 3*   CREATININE 0.6* 0.5* 0.5*   MG 1.8 1.7 1.7   CALCIUM 8.8 8.8 8.3*   CAION 1.28 1.24 1.22     No results for input(s): \"LABALBU\", \"LABA1C\", \"A1HDRLI\", \"FT4\", \"TSH\", \"AST\", \"ALT\", \"LDH\", \"GGT\", \"ALKPHOS\", \"BILITOT\", \"BILIDIR\", \"AMMONIA\", \"AMYLASE\", \"LIPASE\", \"LACTATE\", \"CHOL\", \"HDL\", \"CHOLHDLRATIO\", \"TRIG\", \"VLDL\", \"BNP\", \"TROPONINI\", \"CKTOTAL\", \"CKMB\", \"CKMBINDEX\", \"RF\", \"CABREAR\" in the last 72 hours.    Invalid input(s): \"PROT\", \"M6SOCWE\", \"LDLCHOLESTEROL\"         Protein, UA   Date Value Ref Range Status   10/01/2024 NEGATIVE NEGATIVE mg/dL Final     RBC, UA   Date Value Ref Range Status   05/23/2024 0 TO 2 0 - 2 /HPF Final     Bacteria, UA   Date Value Ref Range Status   05/23/2024 FEW (A) None Final     Nitrite, Urine   Date Value Ref

## 2024-10-07 NOTE — PROGRESS NOTES
Comprehensive Nutrition Assessment    Type and Reason for Visit:  Reassess    Nutrition Recommendations/Plan:   Continue low fiber diet  Patient agreeable to ONS, will send Ensure Clear BID  Encourage good po intakes  Monitor/follow weight, labs, POC, intakes.      Malnutrition Assessment:  Malnutrition Status:  At risk for malnutrition (Comment) (10/03/24 2705)        Nutrition Assessment:    Patient POD 5 s/p exp lap with colostomy. To have CT abdomen today for worsening leukocytosis. Patient advanced to low fiber diet. He reports he is not eating very much, having a lot of pain and nausea. Emesis yesterday. He reports eating very little and is not hungry. Labs, meds, PMH reviewed.    Nutrition Related Findings:    Weight is stable. No edema noted, skin intact. LBM 10/6. Wound Type: None       Current Nutrition Intake & Therapies:    Average Meal Intake: NPO  Average Supplements Intake: NPO  ADULT DIET; Regular; Low Fiber    Anthropometric Measures:  Height:    Ideal Body Weight (IBW):   ( )       Current Body Weight: 72.1 kg (159 lb),   IBW.    Current BMI (kg/m2): 26.5                          BMI Categories: Overweight (BMI 25.0-29.9)    Estimated Daily Nutrient Needs:  Energy Requirements Based On: Kcal/kg     Energy (kcal/day): 5347-1911 (20-25)  Weight Used for Protein Requirements: Current  Protein (g/day):  (1-1.5)  Method Used for Fluid Requirements: 1 ml/kcal  Fluid (ml/day): 5001-2403 (20-25)    Nutrition Diagnosis:   Inadequate oral intake related to altered GI function as evidenced by NPO or clear liquid status due to medical condition    Nutrition Interventions:      Nutrition Education/Counseling: Education not indicated  Coordination of Nutrition Care: Continue to monitor while inpatient       Goals:     Goals: PO intake 50% or greater       Nutrition Monitoring and Evaluation:   Behavioral-Environmental Outcomes: None Identified  Food/Nutrient Intake Outcomes: Diet Advancement/Tolerance,  Food and Nutrient Intake  Physical Signs/Symptoms Outcomes: Biochemical Data, Skin, Nutrition Focused Physical Findings, GI Status    Discharge Planning:    Too soon to determine     Barbara Grayson RD  Contact: [phone]

## 2024-10-07 NOTE — PROGRESS NOTES
Pt taken down for STAT CT abdomen. Pt's IV infiltrated with contrast administration, unable to obtain new IV in CT room. Will take pt back to CT once a new IV is able to be reinserted.

## 2024-10-07 NOTE — PROGRESS NOTES
Physician Progress Note      PATIENT:               MEI CHILEL  CSN #:                  [id number]  :                       1985  ADMIT DATE:       10/1/2024 9:14 PM  DISCH DATE:  RESPONDING  PROVIDER #:        Jacob Kelly MD          QUERY TEXT:    Pt admitted with acute Diverticulitis with perforation and peritonitis. Pt   noted to have WBC 14.2, Lactic acid 2.5 and  on admission. If possible,   please document in the progress notes and discharge summary if you are   evaluating and /or treating any of the following:  The medical record reflects the following:  Risk Factors: diverticulitis, abdominal pain  Clinical Indicators: WBC 14.2, Lactic acid 2.5 and  on admission, temp   100.7  CT ABD shows Significant wall thickening of a loop of small bowel in   the left mid abdomen with associated pneumatosis.  New extraluminal gas is   also seen adjacent to the sigmoid colon in the left lower quadrant.  Both free   fluid and loculated fluid collection suggestive of early abscess are   identified, per surgery consult on 10/2 \"ABDOMEN: Tenderness present in the   whole abdomen, left more than right.  Nondistended, guarding present rebound   tenderness present.  Signs of peritonitis present.\"  Treatment: surgery consult, colostomy creation, drainage of pelvic abscess, IV   Cipro, IV Flagyl, IV fluid bolus, continuous IVF        [email or web address]  Options provided:  -- Sepsis, present on admission  -- Diverticulitis with perforation and peritonitis without Sepsis  -- Other - I will add my own diagnosis  -- Disagree - Not applicable / Not valid  -- Disagree - Clinically unable to determine / Unknown  -- Refer to Clinical Documentation Reviewer    PROVIDER RESPONSE TEXT:    Sepsis secondary to perforated Acute Diverticulitis with localized peritonitis   without organ damage.    Query created by: GENET LINDA on 10/3/2024 12:32 PM      Electronically signed by:  Jacob Kelly

## 2024-10-07 NOTE — PROGRESS NOTES
[facility]  General Surgery  Consult Progress Note    Service date: 10/7/2024, 6:42 AM  Room : [facility]/1004-02   Name: Abdifatah De Anda  MRN: [id number]    Length of stay: 6 day(s)    OR date: 10/2/2024  Procedure(s):  LAPAROTOMY EXPLORATORY DESCENDING COLOSTOMY, SIGMOID RESECTION, drainage of pelvic abscess, tap block  POD#5    Subjective   Interval update:   -Had an episode of emesis yesterday after breakfast.  Patient does endorse that he takes 1 meal a day.  -No complaint of any fever, chills, chest pain, shortness of breath.  -Abdominal pain is well-controlled with medications  - Tolerating regular diet  - Ambulating well  - Continuing on antibiotics  -Has not required any IV pain medications.  .      Objective   Physical exam: BP (!) 147/82   Pulse 90   Temp 98.5 °F (36.9 °C)   Resp 16   Wt 72.1 kg (159 lb)   SpO2 96%   BMI 26.46 kg/m²     General: Patient is resting comfortably on exam, not in any distress and is pleasant and cooperative.   Neuro: alert and oriented x3   Abdomen: soft, tender to palpation around midline incision, non distended. Incision: Clean, dry, intact. No erythema noted. Stoma is appears pink this morning and producing only bowel sweat at present along with active flatus. GALDINO drain in place producing serous output-180 cc in last 24 hours   Chest: Non-labored, symmetrical respirations.   CVS: Pulse      Date 10/07/24 0000 - 10/07/24 2359   Shift 4747-3928 3374-1195 8228-7937 24 Hour Total   INTAKE   I.V.(mL/kg) 7.8(0.1)   7.8(0.1)   IV Piggyback(mL/kg) 360.3(5)   360.3(5)   Shift Total(mL/kg) 368.1(5.1)   368.1(5.1)   OUTPUT   Drains(mL/kg) 30(0.4)   30(0.4)   Stool(mL/kg) 50(0.7)   50(0.7)   Shift Total(mL/kg) 80(1.1)   80(1.1)   Weight (kg) 72.1 72.1 72.1 72.1         Medications:    Current Facility-Administered Medications   Medication Dose Route Frequency Provider Last Rate Last Admin    metoprolol (LOPRESSOR) injection 5 mg  5 mg IntraVENous Q4H PRN Layla Sanchez

## 2024-10-07 NOTE — PROGRESS NOTES
[facility]  Progress Note      NAME:  Abdifatah De Anda  MEDICAL RECORD NUMBER:  [id number]  AGE: 39 y.o.   GENDER:  male  :  1985  TODAY'S DATE:  10/7/2024    Subjective      WO nurse visit or continued ostomy education.       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    Dry eyes H04.123    Floaters H43.399    Immunosuppressed status ([facility]) D84.9    Iridocyclitis associated with HLA-B27 positivity H20.9    CHRISTINA (acute kidney injury) ([facility]) N17.9    Arthritis, rheumatoid (HCC) M06.9    Anxiety F41.9    Tobacco abuse Z72.0    Alcohol abuse F10.10    Benign essential hypertension I10    Diverticulitis K57.92    History of opioid abuse ([facility]) F11.11    Intra-abdominal free air of unknown etiology K66.8    Perforation of sigmoid colon due to diverticulitis K57.20       LABS:  WBC:    Lab Results   Component Value Date/Time    WBC 17.1 10/07/2024 05:29 AM     H/H:    Lab Results   Component Value Date/Time    HGB 11.0 10/07/2024 05:29 AM    HCT 32.4 10/07/2024 05:29 AM     BMP:    Lab Results   Component Value Date/Time     10/07/2024 05:29 AM    K 3.7 10/07/2024 05:29 AM    CL 99 10/07/2024 05:29 AM    CO2 27 10/07/2024 05:29 AM    BUN 3 10/07/2024 05:29 AM    CREATININE 0.5 10/07/2024 05:29 AM    CALCIUM 8.3 10/07/2024 05:29 AM    GFRAA >60 2020 05:49 AM    LABGLOM >90 10/07/2024 05:29 AM    GLUCOSE 88 10/07/2024 05:29 AM     PTT:    Lab Results   Component Value Date/Time    APTT 29.9 2020 09:53 AM   [APTT}  PT/INR:    Lab Results   Component Value Date/Time    PROTIME 12.3 2024 04:10 AM    INR 0.9 2024 04:10 AM            Assessment         Met with patient

## 2024-10-07 NOTE — CONSULTS
[facility]  Initial Consult Note  Date: 10/7/2024    Hospital day :6     Impression:   Acute complicated/perforated diverticulitis diverticulitis status post Holder's procedure with end colostomy creation 10/02/2024  Postoperative abdominal pain with leukocytosis concern for residual/new abscess formation  Rheumatoid arthritis/ankylosing spondylitis  Essential hypertension  History of substance abuse    Recommendations   The patient has been on antimicrobial therapy with ciprofloxacin and metronidazole and just in case there is fluoroquinolone resistance I will switch to Rocephin and continue the metronidazole  There was some yeast isolated on the surgical cultures and the plan will be to add fluconazole to the antibiotic regimen  The patient does have some leukocytosis as well and the plan is for him to undergo CT imaging of the abdomen pelvis to look for further abscess formation  I will follow the clinical progress and imaging data and adjust therapy accordingly  The plan should ultimately be to switch to oral antibiotic therapy    Chief complaint/reason for consultation:     Question Answer   Reason for Consult? s/p hartmanns for perf divertic, worsening leukocytosis, candida on cx     History of Present Illness:   Abdifatah De Anda is a 39 y.o.-year-old male who was initially admitted on 10/1/2024.   Abdifatah has a rheumatoid arthritis, ankylosing spondylolysis, hypertension, anxiety disorder, tobacco abuse, EtOH abuse who has been having abdominal pain for 1 to 2 weeks and was seen at a [facility] facility had a CT scan showing sigmoid diverticulitis without any complications and the patient reports that after being hospitalized for a day or 2 for observation the patient was discharged on oral antimicrobial therapy with ciprofloxacin and metronidazole though he still reported significant pain at that time.  The patient ended up coming to the emergency room here at [facility]

## 2024-10-08 LAB
ANION GAP SERPL CALCULATED.3IONS-SCNC: 9 MMOL/L (ref 9–17)
BASOPHILS # BLD: 0.07 K/UL (ref 0–0.2)
BASOPHILS NFR BLD: 1 % (ref 0–2)
BUN SERPL-MCNC: 4 MG/DL (ref 6–20)
BUN/CREAT SERPL: 8 (ref 9–20)
CALCIUM SERPL-MCNC: 8.5 MG/DL (ref 8.6–10.4)
CHLORIDE SERPL-SCNC: 99 MMOL/L (ref 98–107)
CO2 SERPL-SCNC: 29 MMOL/L (ref 20–31)
CREAT SERPL-MCNC: 0.5 MG/DL (ref 0.7–1.2)
EOSINOPHIL # BLD: 0.34 K/UL (ref 0–0.44)
EOSINOPHILS RELATIVE PERCENT: 2 % (ref 1–4)
ERYTHROCYTE [DISTWIDTH] IN BLOOD BY AUTOMATED COUNT: 13.8 % (ref 11.8–14.4)
GFR, ESTIMATED: >90 ML/MIN/1.73M2
GLUCOSE SERPL-MCNC: 97 MG/DL (ref 70–99)
HCT VFR BLD AUTO: 33.2 % (ref 40.7–50.3)
HGB BLD-MCNC: 11.3 G/DL (ref 13–17)
IMM GRANULOCYTES # BLD AUTO: 0.19 K/UL (ref 0–0.3)
IMM GRANULOCYTES NFR BLD: 1 %
LYMPHOCYTES NFR BLD: 1.03 K/UL (ref 1.1–3.7)
LYMPHOCYTES RELATIVE PERCENT: 7 % (ref 24–43)
MAGNESIUM SERPL-MCNC: 1.8 MG/DL (ref 1.6–2.6)
MCH RBC QN AUTO: 31.8 PG (ref 25.2–33.5)
MCHC RBC AUTO-ENTMCNC: 34 G/DL (ref 28.4–34.8)
MCV RBC AUTO: 93.5 FL (ref 82.6–102.9)
MONOCYTES NFR BLD: 1.07 K/UL (ref 0.1–1.2)
MONOCYTES NFR BLD: 7 % (ref 3–12)
NEUTROPHILS NFR BLD: 82 % (ref 36–65)
NEUTS SEG NFR BLD: 11.84 K/UL (ref 1.5–8.1)
NRBC BLD-RTO: 0 PER 100 WBC
PLATELET # BLD AUTO: 431 K/UL (ref 138–453)
PMV BLD AUTO: 9.8 FL (ref 8.1–13.5)
POTASSIUM SERPL-SCNC: 3.8 MMOL/L (ref 3.7–5.3)
RBC # BLD AUTO: 3.55 M/UL (ref 4.21–5.77)
SODIUM SERPL-SCNC: 137 MMOL/L (ref 135–144)
WBC OTHER # BLD: 14.5 K/UL (ref 3.5–11.3)

## 2024-10-08 PROCEDURE — 99232 SBSQ HOSP IP/OBS MODERATE 35: CPT | Performed by: INTERNAL MEDICINE

## 2024-10-08 PROCEDURE — 6370000000 HC RX 637 (ALT 250 FOR IP): Performed by: NURSE PRACTITIONER

## 2024-10-08 PROCEDURE — 85025 COMPLETE CBC W/AUTO DIFF WBC: CPT

## 2024-10-08 PROCEDURE — 6360000002 HC RX W HCPCS: Performed by: INTERNAL MEDICINE

## 2024-10-08 PROCEDURE — 80048 BASIC METABOLIC PNL TOTAL CA: CPT

## 2024-10-08 PROCEDURE — 2580000003 HC RX 258: Performed by: SURGERY

## 2024-10-08 PROCEDURE — 6360000002 HC RX W HCPCS: Performed by: SURGERY

## 2024-10-08 PROCEDURE — 36415 COLL VENOUS BLD VENIPUNCTURE: CPT

## 2024-10-08 PROCEDURE — 6360000002 HC RX W HCPCS: Performed by: FAMILY MEDICINE

## 2024-10-08 PROCEDURE — 6370000000 HC RX 637 (ALT 250 FOR IP)

## 2024-10-08 PROCEDURE — 2580000003 HC RX 258: Performed by: INTERNAL MEDICINE

## 2024-10-08 PROCEDURE — 82330 ASSAY OF CALCIUM: CPT

## 2024-10-08 PROCEDURE — 1200000000 HC SEMI PRIVATE

## 2024-10-08 PROCEDURE — 6370000000 HC RX 637 (ALT 250 FOR IP): Performed by: STUDENT IN AN ORGANIZED HEALTH CARE EDUCATION/TRAINING PROGRAM

## 2024-10-08 PROCEDURE — 83735 ASSAY OF MAGNESIUM: CPT

## 2024-10-08 PROCEDURE — 6370000000 HC RX 637 (ALT 250 FOR IP): Performed by: FAMILY MEDICINE

## 2024-10-08 PROCEDURE — 2500000003 HC RX 250 WO HCPCS: Performed by: NURSE PRACTITIONER

## 2024-10-08 PROCEDURE — 99213 OFFICE O/P EST LOW 20 MIN: CPT

## 2024-10-08 PROCEDURE — 6370000000 HC RX 637 (ALT 250 FOR IP): Performed by: INTERNAL MEDICINE

## 2024-10-08 RX ORDER — PANTOPRAZOLE SODIUM 40 MG/1
40 TABLET, DELAYED RELEASE ORAL
Status: DISCONTINUED | OUTPATIENT
Start: 2024-10-09 | End: 2024-10-10 | Stop reason: HOSPADM

## 2024-10-08 RX ORDER — HYDRALAZINE HYDROCHLORIDE 20 MG/ML
10 INJECTION INTRAMUSCULAR; INTRAVENOUS ONCE
Status: COMPLETED | OUTPATIENT
Start: 2024-10-08 | End: 2024-10-08

## 2024-10-08 RX ORDER — FLUCONAZOLE 100 MG/1
400 TABLET ORAL DAILY
Status: DISCONTINUED | OUTPATIENT
Start: 2024-10-09 | End: 2024-10-10 | Stop reason: HOSPADM

## 2024-10-08 RX ORDER — METRONIDAZOLE 500 MG/1
500 TABLET ORAL EVERY 8 HOURS SCHEDULED
Status: DISCONTINUED | OUTPATIENT
Start: 2024-10-08 | End: 2024-10-10 | Stop reason: HOSPADM

## 2024-10-08 RX ADMIN — OXYCODONE HYDROCHLORIDE 10 MG: 5 TABLET ORAL at 00:12

## 2024-10-08 RX ADMIN — GABAPENTIN 300 MG: 300 CAPSULE ORAL at 14:42

## 2024-10-08 RX ADMIN — SODIUM CHLORIDE, PRESERVATIVE FREE 40 MG: 5 INJECTION INTRAVENOUS at 10:39

## 2024-10-08 RX ADMIN — ENOXAPARIN SODIUM 40 MG: 100 INJECTION SUBCUTANEOUS at 10:38

## 2024-10-08 RX ADMIN — CYCLOBENZAPRINE 10 MG: 10 TABLET, FILM COATED ORAL at 20:35

## 2024-10-08 RX ADMIN — SODIUM CHLORIDE, PRESERVATIVE FREE 10 ML: 5 INJECTION INTRAVENOUS at 21:00

## 2024-10-08 RX ADMIN — IBUPROFEN 400 MG: 400 TABLET, FILM COATED ORAL at 20:35

## 2024-10-08 RX ADMIN — LORAZEPAM 1 MG: 2 INJECTION INTRAMUSCULAR; INTRAVENOUS at 01:08

## 2024-10-08 RX ADMIN — METRONIDAZOLE 500 MG: 500 TABLET ORAL at 20:30

## 2024-10-08 RX ADMIN — IBUPROFEN 400 MG: 400 TABLET, FILM COATED ORAL at 14:42

## 2024-10-08 RX ADMIN — SODIUM CHLORIDE, PRESERVATIVE FREE 10 ML: 5 INJECTION INTRAVENOUS at 10:39

## 2024-10-08 RX ADMIN — FLUCONAZOLE 400 MG: 400 INJECTION, SOLUTION INTRAVENOUS at 10:53

## 2024-10-08 RX ADMIN — OXYCODONE HYDROCHLORIDE 10 MG: 5 TABLET ORAL at 17:56

## 2024-10-08 RX ADMIN — IBUPROFEN 400 MG: 400 TABLET, FILM COATED ORAL at 03:46

## 2024-10-08 RX ADMIN — METOPROLOL TARTRATE 5 MG: 5 INJECTION INTRAVENOUS at 16:45

## 2024-10-08 RX ADMIN — OXYCODONE HYDROCHLORIDE 10 MG: 5 TABLET ORAL at 22:49

## 2024-10-08 RX ADMIN — LISINOPRIL 40 MG: 40 TABLET ORAL at 10:38

## 2024-10-08 RX ADMIN — WATER 1000 MG: 1 INJECTION INTRAMUSCULAR; INTRAVENOUS; SUBCUTANEOUS at 10:39

## 2024-10-08 RX ADMIN — IBUPROFEN 400 MG: 400 TABLET, FILM COATED ORAL at 10:38

## 2024-10-08 RX ADMIN — METRONIDAZOLE 500 MG: 500 INJECTION, SOLUTION INTRAVENOUS at 13:17

## 2024-10-08 RX ADMIN — ACETAMINOPHEN 1000 MG: 500 TABLET ORAL at 06:05

## 2024-10-08 RX ADMIN — TRAZODONE HYDROCHLORIDE 100 MG: 100 TABLET ORAL at 01:07

## 2024-10-08 RX ADMIN — CYCLOBENZAPRINE 10 MG: 10 TABLET, FILM COATED ORAL at 11:27

## 2024-10-08 RX ADMIN — HYDRALAZINE HYDROCHLORIDE 10 MG: 20 INJECTION INTRAMUSCULAR; INTRAVENOUS at 17:52

## 2024-10-08 RX ADMIN — GABAPENTIN 300 MG: 300 CAPSULE ORAL at 22:30

## 2024-10-08 RX ADMIN — OXYCODONE HYDROCHLORIDE 10 MG: 5 TABLET ORAL at 05:20

## 2024-10-08 RX ADMIN — CYCLOBENZAPRINE 10 MG: 10 TABLET, FILM COATED ORAL at 15:57

## 2024-10-08 RX ADMIN — METRONIDAZOLE 500 MG: 500 INJECTION, SOLUTION INTRAVENOUS at 03:51

## 2024-10-08 RX ADMIN — GABAPENTIN 300 MG: 300 CAPSULE ORAL at 06:05

## 2024-10-08 RX ADMIN — OXYCODONE HYDROCHLORIDE 10 MG: 5 TABLET ORAL at 14:42

## 2024-10-08 RX ADMIN — OXYCODONE HYDROCHLORIDE 10 MG: 5 TABLET ORAL at 10:38

## 2024-10-08 RX ADMIN — ACETAMINOPHEN 1000 MG: 500 TABLET ORAL at 22:21

## 2024-10-08 RX ADMIN — ACETAMINOPHEN 1000 MG: 500 TABLET ORAL at 14:42

## 2024-10-08 RX ADMIN — METOPROLOL TARTRATE 5 MG: 5 INJECTION INTRAVENOUS at 04:55

## 2024-10-08 ASSESSMENT — PAIN DESCRIPTION - DESCRIPTORS
DESCRIPTORS: ACHING
DESCRIPTORS: ACHING;CRAMPING;DISCOMFORT
DESCRIPTORS: SHARP;ACHING
DESCRIPTORS: SORE;ACHING
DESCRIPTORS: ACHING;SHARP
DESCRIPTORS: ACHING;SORE
DESCRIPTORS: ACHING;SHARP
DESCRIPTORS: ACHING;DISCOMFORT
DESCRIPTORS: ACHING;DISCOMFORT
DESCRIPTORS: SORE;ACHING

## 2024-10-08 ASSESSMENT — PAIN - FUNCTIONAL ASSESSMENT

## 2024-10-08 ASSESSMENT — PAIN SCALES - GENERAL
PAINLEVEL_OUTOF10: 7
PAINLEVEL_OUTOF10: 7
PAINLEVEL_OUTOF10: 4
PAINLEVEL_OUTOF10: 3
PAINLEVEL_OUTOF10: 4
PAINLEVEL_OUTOF10: 7
PAINLEVEL_OUTOF10: 5
PAINLEVEL_OUTOF10: 3
PAINLEVEL_OUTOF10: 6
PAINLEVEL_OUTOF10: 5
PAINLEVEL_OUTOF10: 7
PAINLEVEL_OUTOF10: 0
PAINLEVEL_OUTOF10: 8
PAINLEVEL_OUTOF10: 6
PAINLEVEL_OUTOF10: 6
PAINLEVEL_OUTOF10: 7
PAINLEVEL_OUTOF10: 6
PAINLEVEL_OUTOF10: 6
PAINLEVEL_OUTOF10: 7
PAINLEVEL_OUTOF10: 5
PAINLEVEL_OUTOF10: 7

## 2024-10-08 ASSESSMENT — PAIN DESCRIPTION - ORIENTATION
ORIENTATION: RIGHT;LEFT
ORIENTATION: RIGHT;LEFT;MID
ORIENTATION: RIGHT;LEFT
ORIENTATION: RIGHT;LEFT

## 2024-10-08 ASSESSMENT — PAIN DESCRIPTION - LOCATION
LOCATION: ABDOMEN

## 2024-10-08 ASSESSMENT — ENCOUNTER SYMPTOMS
RESPIRATORY NEGATIVE: 1
ABDOMINAL PAIN: 1

## 2024-10-08 NOTE — PLAN OF CARE
Pt is currently resting in bed with no complaints of ABD pain. VSS. Scheduled motrin, flexeril, and tylenol given. PRN oxy available upon request and given Q4. Independent in the room and tolerating ambulation very well. Colostomy has had 20 ml out of loose brown stool overnight. GALDINO drain has had 20 ml out overnight. K+ resulted as 3.5 this morning. Writer notified on call NP.    Standard safety measures in place. Call light within reach. Bed in locked and lowest position. Bed alarm off.     Patient having pain on their abdomen and rates it a 7. Pain interventions includemedication. Patients goal for pain relief is  0 . The need for pain and symptom management will be considered in the discharge planning process to ensure patients comfort.      Problem: Discharge Planning  Goal: Discharge to home or other facility with appropriate resources  10/8/2024 1941 by Oh Osei RN  Outcome: Progressing     Problem: Safety - Adult  Goal: Free from fall injury  10/8/2024 1941 by Oh Osei RN  Outcome: Progressing     Problem: Pain  Goal: Verbalizes/displays adequate comfort level or baseline comfort level  10/8/2024 1941 by Oh Osei RN  Outcome: Progressing     Problem: Respiratory - Adult  Goal: Achieves optimal ventilation and oxygenation  10/8/2024 1941 by Oh Osei RN  Outcome: Progressing     Problem: Cardiovascular - Adult  Goal: Maintains optimal cardiac output and hemodynamic stability  10/8/2024 1941 by Oh Osei RN  Outcome: Progressing     Problem: Gastrointestinal - Adult  Goal: Minimal or absence of nausea and vomiting  10/8/2024 1941 by Oh Osei RN  Outcome: Progressing     Problem: Gastrointestinal - Adult  Goal: Maintains or returns to baseline bowel function  10/8/2024 1941 by Oh Osei RN  Outcome: Progressing     Problem: Gastrointestinal - Adult  Goal: Establish and maintain optimal ostomy function  10/8/2024 1941 by Oh Osei RN  Outcome: Progressing     Problem:

## 2024-10-08 NOTE — PROGRESS NOTES
[facility]  Office: [phone]  Fady Flores, DO, Chris Medina, DO, Nick Yarbrough DO, Gary Wei, DO, Aileen Lloyd MD, Marie Astorga MD, Jacob Kelly MD, Zaira Estrella MD,  German Ramos MD, Vi Castillo MD, Sigifredo Schwartz MD,  Denice Streeter DO, Sebastián Suggs MD, Tye Boateng MD, Stan Flores DO, Nida Wilhelm MD,  Damir Herrera DO, Mallory Brasher MD, Rosana Escalante MD, Conchis Jiménez MD, Salazar Trinh MD,  Santos Rai MD, Jonan Delgado MD, Raymundo Ambrocio MD, Silver Fisher MD, nAgel Dhaliwal MD, Michael Lewis MD, Maxwell Sifuentes, DO, Camron Salas DO, Dillon Brizuela DO, Delvin Leija MD, Shirley Waterhouse, CNP,  Kasie Eraly CNP, Maxwell Lugo, CNP,  Desiree Hannah, DNP, Stacie Moss, CNP, Rosangela Kennedy, CNP, Lauren Franco, CNP, Layla Fritz, CNP, Leandra Vega, PA-C, Ivory Cool, PA-C, Taina Cunningham, CNP, Denys Pond, CNP,  Anusha Romero, CNP, Twila Montgomery, CNP, Gretchen Rudolph, CNP, Maria De Jesus Sebastian, CNS, Michelle Thapa, CNP, Dulce Sky, CNP, Tracy Schwab, CNP         [facility]   IN-PATIENT SERVICE   [facility]    Progress Note    10/8/2024    2:30 PM    Name:   Abdifatah De Anda  MRN:     [id number]     Acct:      [id number]   Room:   [facility]/1004-02  IP Day:  7  Admit Date:  10/1/2024  9:14 PM    PCP:   Brennon Reyes DO  Code Status:  Full Code    Subjective:     C/C:   Chief Complaint   Patient presents with    Abdominal Pain     Pt was d/c from facility at 1300 today, returned back to facility 1500 as he began to feel worse again with his symptoms. Patient reports he is unable to eat. Denies having a BM. Denies urination complications.      Interval History Status: .   Denies abdominal pain today  Tolerating low fiber diet  Had liquid bowel movement through colostomy  Colostomy bag/dressing is being changed by ostomy nurse  Has GALDINO drain in right lower quadrant  Brief History:     Abdifatah CECELIA De Anda is 39 y.o. male

## 2024-10-08 NOTE — DISCHARGE INSTRUCTIONS
New Ostomy Discharge Instructions    Ostomy Assessment:  Location: [facility]   Type: Colostomy    Diameter: 35mm  Color: red  Output: brown - loose to soft, mucous  Creases/Folds:none  Stents/Bridges: none    Ostomy Supplies Used:   Coloplast 1 piece flat system  #[id number]     Brava Skin Barrier Wipe   #[id number]     Brava Protective Seal 4.2mm thick   #[id number]     SenSura Doe 1-pc. EasiClose Wide Drainable Pouch with filter     Ostomy Discharge Needs:    -Ostomy education provided to patient.     -Home care nursing should support ostomy independence.     -Patient enrolled in Col[employer] Care program prior to hospital discharge (sample kit to arrive at patient's home).    -Patient was provided with a small amount of samples to transition home with. Home care will need to order supplies as soon as possible.    -Patient will need set up with ostomy supply company prior to home care discharge.    -For ostomy/pouching concerns and questions, please call [facility] at [phone].    Routine Ostomy Care:  -Cleanse site with water only. Do not use any soaps, wipes, or lotions. Pat dry.  -Cut barrier/pouch to fit stoma with no more than 1/8\" of exposed skin.  -Apply skin barrier wipe to skin around stoma.  -Place a protective seal or barrier ring immediately around the stoma and apply pouch on top. Knead adhesives together with fingertips to help them adhere to the skin.    -Try to change pouch first thing in the morning (the stoma will be less active before eating or drinking).  -Empty the pouch when 1/3 to 1/2 full.  -Change the pouching system twice weekly or every 3-5 days.  -Our goal is to keep the skin around the stoma intact and to have a dependable pouching system without leaks.  -The skin around the stoma should be intact and look just like the skin on the opposite side of the abdomen. It should not be red or broken. For problems/questions, please call [facility] at

## 2024-10-08 NOTE — PROGRESS NOTES
Mental Status: He is alert and oriented to person, place, and time.         Laboratory data:   I have independently reviewed the followinglabs:  CBC with Differential:   Recent Labs     10/07/24  0529 10/08/24  0506   WBC 17.1* 14.5*   HGB 11.0* 11.3*   HCT 32.4* 33.2*    431   LYMPHOPCT 8* 7*   MONOPCT 7 7   EOSPCT 3 2     BMP:   Recent Labs     10/07/24  0529 10/08/24  0506    137   K 3.7 3.8   CL 99 99   CO2 27 29   BUN 3* 4*   CREATININE 0.5* 0.5*   MG 1.7 1.8     Hepatic Function Panel: No results for input(s): \"LABALBU\", \"BILIDIR\", \"IBILI\", \"BILITOT\", \"ALKPHOS\", \"ALT\", \"AST\" in the last 72 hours.    Invalid input(s): \"PROT\"      No results found for: \"PROCAL\"  No results found for: \"CRP\"  Lab Results   Component Value Date    SEDRATE 36 (H) 09/28/2024         No results found for: \"DDIMER\"  No results found for: \"FERRITIN\"  No results found for: \"LDH\"  No results found for: \"FIBRINOGEN\"    No results found for requested labs within last 30 days.     Lab Results   Component Value Date/Time    COVID19 Not Detected 08/29/2020 02:00 PM       No results for input(s): \"VANCOTROUGH\" in the last 72 hours.    Imaging Studies:   CT OF THE ABDOMEN AND PELVIS WITH CONTRAST 10/7/2024 8:10 am   IMPRESSION:  1.  Interval left hemicolectomy since 10/1.     2.  Peritoneal thickening in the left lower quadrant compatible with  peritonitis.  Small volume ascites.     3.  No rim enhancing fluid collection.     4.  Partially imaged airspace opacity in the lingula, atelectasis versus  pneumonia.     5.  Small left pleural effusion and segmental left lower lobe atelectasis.              Exam Ended: 10/07/24 12:35 EDT Last Resulted: 10/07/24 14:14 EDT        Cultures:   Culture and Sensitivities:  No results for input(s): \"SPECDESC\", \"SPECIAL\", \"CULTURE\", \"STATUS\", \"ORG\", \"CDIFFTOXPCR\", \"CAMPYLOBPCR\", \"SALMONELLAPC\", \"SHIGAPCR\", \"SHIGELLAPCR\" in the last 72 hours.    Procedure Component Value Units Date/Time

## 2024-10-08 NOTE — PROGRESS NOTES
Fluconazole and Metronidazole changed from IV to PO per [facility] approved policy.    Basic Criteria (please refer to hospital policy for details):  1. functioning GI tract  2. tolerating PO/NG routine medications  2. Antibiotics: Received at least 24 hours of IV, clinical stabilization, afebrile, normalizing WBC)    Thank you.  GRISELDA HICKEY RPH 10/8/2024 4:19 PM

## 2024-10-08 NOTE — PROGRESS NOTES
Pantoprazole changed from IV to PO per [facility] approved policy.    Basic Criteria (please refer to hospital policy for details):  1. functioning GI tract  2. tolerating PO/NG routine medications  2. Antibiotics: Received at least 24 hours of IV, clinical stabilization, afebrile, normalizing WBC)    Thank you.  GRISELDA HICKEY RPH 10/8/2024 1:03 PM

## 2024-10-08 NOTE — PLAN OF CARE
D/c plan likely home when cleared, pt seems to be tolerating diet okay. Pt remains A&O x 4, on RA, afebrile. No new s/s of skin breakdown or injury. Safety protocols in place and enforced. Pt ambulates independently. Pt has     Problem: Discharge Planning  Goal: Discharge to home or other facility with appropriate resources  10/8/2024 0921 by Twila Brand RN  Outcome: Progressing     Problem: Safety - Adult  Goal: Free from fall injury  10/8/2024 0921 by Twila Brand RN  Outcome: Progressing     Problem: Pain  Goal: Verbalizes/displays adequate comfort level or baseline comfort level  10/8/2024 0921 by Twila Brand RN  Outcome: Progressing     Problem: Respiratory - Adult  Goal: Achieves optimal ventilation and oxygenation  10/8/2024 0921 by Twila Brand RN  Outcome: Progressing     Problem: Cardiovascular - Adult  Goal: Maintains optimal cardiac output and hemodynamic stability  10/8/2024 0921 by Twila Brand RN  Outcome: Progressing     Problem: Gastrointestinal - Adult  Goal: Minimal or absence of nausea and vomiting  10/8/2024 0921 by Twila Brand RN  Outcome: Progressing     Problem: Gastrointestinal - Adult  Goal: Maintains or returns to baseline bowel function  10/8/2024 0921 by Twila Brand RN  Outcome: Progressing     Problem: Gastrointestinal - Adult  Goal: Establish and maintain optimal ostomy function  10/8/2024 0921 by Twila Brand RN  Outcome: Progressing     Problem: ABCDS Injury Assessment  Goal: Absence of physical injury  10/8/2024 0921 by Twila Brand RN  Outcome: Progressing     Problem: Nutrition Deficit:  Goal: Optimize nutritional status  10/8/2024 0921 by Twila Brand RN  Outcome: Progressing     Problem: Skin/Tissue Integrity - Adult  Goal: Incisions, wounds, or drain sites healing without S/S of infection  10/8/2024 0921 by Twila Brand RN  Outcome: Progressing     Problem: Anxiety  Goal: Will report anxiety at manageable levels  Description:

## 2024-10-08 NOTE — PROGRESS NOTES
[facility] Nurse  Progress Note      NAME:  Abdifatah De Anda  MEDICAL RECORD NUMBER:  [id number]  AGE: 39 y.o.   GENDER:  male  :  1985  TODAY'S DATE:  10/8/2024    Subjective      Ostomy nurse visit for continued ostomy teaching.    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    Dry eyes H04.123    Floaters H43.399    Immunosuppressed status ([facility]) D84.9    Iridocyclitis associated with HLA-B27 positivity H20.9    CHRISTINA (acute kidney injury) ([facility]) N17.9    Arthritis, rheumatoid ([facility]) M06.9    Anxiety F41.9    Tobacco abuse Z72.0    Alcohol abuse F10.10    Benign essential hypertension I10    Diverticulitis K57.92    History of opioid abuse ([facility]) F11.11    Intra-abdominal free air of unknown etiology K66.8    Perforation of sigmoid colon due to diverticulitis K57.20    Intra-abdominal abscess ([facility]) K65.1       LABS:  WBC:    Lab Results   Component Value Date/Time    WBC 14.5 10/08/2024 05:06 AM     H/H:    Lab Results   Component Value Date/Time    HGB 11.3 10/08/2024 05:06 AM    HCT 33.2 10/08/2024 05:06 AM     BMP:    Lab Results   Component Value Date/Time     10/08/2024 05:06 AM    K 3.8 10/08/2024 05:06 AM    CL 99 10/08/2024 05:06 AM    CO2 29 10/08/2024 05:06 AM    BUN 4 10/08/2024 05:06 AM    CREATININE 0.5 10/08/2024 05:06 AM    CALCIUM 8.5 10/08/2024 05:06 AM    GFRAA >60 2020 05:49 AM    LABGLOM >90 10/08/2024 05:06 AM    GLUCOSE 97 10/08/2024 05:06 AM     PTT:    Lab Results   Component Value Date/Time    APTT 29.9 2020 09:53 AM   [APTT}  PT/INR:    Lab Results   Component Value Date/Time    PROTIME 12.3 2024 04:10 AM    INR 0.9 2024 04:10 AM           Assessment              Colostomy LLQ Descending/sigmoid (Active)   Stomal Appliance 1 piece;Changed 10/08/24 1109   Stoma  Assessment Red 10/08/24 1109   Peristomal Assessment Clean, dry & intact 10/08/24 1109   Mucocutaneous Junction Intact 10/08/24 1109   Treatment Liquid skin barrier;Barrier ring;Pouch change 10/08/24 1109   Stool Appearance Soft;Mucous 10/08/24 1109   Stool Color Brown 10/08/24 1109   Stool Amount Large 10/08/24 1109   Output (mL) 200 ml 10/08/24 1047   Number of days: 6                  Intake/Output Summary (Last 24 hours) at 10/8/2024 1110  Last data filed at 10/8/2024 1047  Gross per 24 hour   Intake 1217.6 ml   Output 290 ml   Net 927.6 ml       Ostomy and Peristomal Assessment:     Stoma Type:colostomy    Diameter: 35mm  Protrusion: yes  Mucosal condition and color: Red and moist  Mucocutaneous junction: Intact  Peristomal Skin: intact  Character of output: loose to soft, mucous  Current pouching system: Coloplast Sensura [place] 1 piece, flat  Current wear time: 48 hours      Plan   Plan for Ostomy Care:        Ostomy teaching completed this visit. Appliance change performed and patient actively participates with measuring of stoma, site inspection, cutting of appliance, placement of barrier ring and pouch.   Patient can verbalize the correct time frame for appliance changes, when to empty the pouch, frequency of measuring of stoma, and where to call if needing help or support.     -Provided a small amount of ostomy supplies to bridge to home delivery/home care    -DELISA updated    -Enrollment in [employer] Program    Ostomy Plan of Care  [x] Supplies/Instructions left in room  [] Patient using home supplies  [x] Brand/supplies at bedside Coloplast  [x] Current pouching system     Coloplast 1 piece flat system  #35153     Brava Skin Barrier Wipe   #02418     Brava Protective Seal 4.2mm thick   #22536     SenSura Doe 1-pc. EasiClose Wide Drainable Pouch with filter       Current Diet:

## 2024-10-08 NOTE — PLAN OF CARE
Patient is A&O x4, on room air, and walks around independently.   Patient is sinus rhythm on monitor, and gets tacky when ambulating in the halls   Patient has ostomy and GALDINO drain in place  Patient is on IV antibiotics Rocephin, flagyl and diflucan.   Patient given PRN ford for pain, see Mar for administration.   Patient blood pressure elevated, 1x dose Lopressor given, see MAR  Safety maintained throughout shift, bed in lowest position, call light within reach, hourly rounding continued.     Patient having pain on their abdomen and rates it a 7. Pain interventions include frequent position changes and medication. Patients goal for pain relief is 1. The need for pain and symptom management will be considered in the discharge planning process to ensure patients comfort.    Problem: Discharge Planning  Goal: Discharge to home or other facility with appropriate resources  10/7/2024 2239 by Ruba Szymanski RN  Outcome: Progressing     Problem: Safety - Adult  Goal: Free from fall injury  10/7/2024 2239 by Ruba Szymanski RN  Outcome: Progressing     Problem: Pain  Goal: Verbalizes/displays adequate comfort level or baseline comfort level  10/7/2024 2239 by Ruba Szymanski RN  Outcome: Progressing     Problem: Respiratory - Adult  Goal: Achieves optimal ventilation and oxygenation  10/7/2024 2239 by Ruba Szymanski RN  Outcome: Progressing     Problem: Cardiovascular - Adult  Goal: Maintains optimal cardiac output and hemodynamic stability  10/7/2024 2239 by Ruba Szymanski RN  Outcome: Progressing     Problem: Gastrointestinal - Adult  Goal: Minimal or absence of nausea and vomiting  10/7/2024 2239 by Ruba Szymanski RN  Outcome: Progressing     Problem: Gastrointestinal - Adult  Goal: Maintains or returns to baseline bowel function  10/7/2024 2239 by Ruba Szymanski RN  Outcome: Progressing     Problem: Gastrointestinal - Adult  Goal: Establish and maintain optimal ostomy function  10/7/2024 2239 by Singer  SARAH Yeh  Outcome: Progressing     Problem: ABCDS Injury Assessment  Goal: Absence of physical injury  10/7/2024 2239 by Ruba Szymanski RN  Outcome: Progressing     Problem: Nutrition Deficit:  Goal: Optimize nutritional status  10/7/2024 2239 by Ruba Szymanski RN  Outcome: Progressing     Problem: Skin/Tissue Integrity - Adult  Goal: Incisions, wounds, or drain sites healing without S/S of infection  10/7/2024 2239 by Ruba Szymanski RN  Outcome: Progressing     Problem: Anxiety  Goal: Will report anxiety at manageable levels  Description: INTERVENTIONS:  1. Administer medication as ordered  2. Teach and rehearse alternative coping skills  3. Provide emotional support with 1:1 interaction with staff  10/7/2024 2239 by Ruba Szymanski RN  Outcome: Progressing     Problem: Drug Abuse/Detox  Goal: Will have no detox symptoms and will verbalize plan for changing drug-related behavior  Description: INTERVENTIONS:  1. Administer medication as ordered  2. Monitor physical status  3. Provide emotional support with 1:1 interaction with staff  4. Encourage  recovery focused treatment   10/7/2024 2239 by Ruba Szymanski RN  Outcome: Progressing     Problem: Infection - Adult  Goal: Absence of infection at discharge  10/7/2024 2239 by Ruba Szymanski RN  Outcome: Progressing

## 2024-10-08 NOTE — PROGRESS NOTES
[facility]  General Surgery  Consult Progress Note    Service date: 10/8/2024, 6:58 AM  Room : [facility]   Name: Abdifatah De Anda  MRN: [id number]    Length of stay: 7 day(s)    OR date: 10/2/2024  Procedure(s):  LAPAROTOMY EXPLORATORY DESCENDING COLOSTOMY, SIGMOID RESECTION, drainage of pelvic abscess, tap block  POD#6    Subjective   Interval update:   -States he is still having pain, but slightly improved  -No complaint of any fever, chills, chest pain, shortness of breath.  -Abdominal pain is controlled with medications  - Tolerating regular diet  - Ambulating well  - On IV abx  - States walking more  .      Objective   Physical exam: /87   Pulse 94   Temp 98.3 °F (36.8 °C) (Oral)   Resp 16   Wt 71.2 kg (156 lb 14.4 oz)   SpO2 96%   BMI 26.11 kg/m²     General: Patient is resting comfortably on exam, not in any distress and is pleasant and cooperative.   Neuro: alert and oriented x3   Abdomen: soft, tender to palpation around midline incision, non distended. Incision: Clean, dry, intact. No erythema noted. Stoma is appears pink this morning and producing 200cc of stool over last 24 hours along with active flatus. GALDINO drain in place producing serous output-140 cc in last 24 hours   Chest: Non-labored, symmetrical respirations.   CVS: Pulse      Date 10/08/24 0000 - 10/08/24 2359   Shift 6677-1815 4186-9542 0947-0875 24 Hour Total   INTAKE   I.V.(mL/kg) 1.1(0)   1.1(0)   IV Piggyback(mL/kg) 186.2(2.6)   186.2(2.6)   Shift Total(mL/kg) 187.4(2.6)   187.4(2.6)   OUTPUT   Drains(mL/kg) 20(0.3)   20(0.3)   Shift Total(mL/kg) 20(0.3)   20(0.3)   Weight (kg) 71.2 71.2 71.2 71.2         Medications:    Current Facility-Administered Medications   Medication Dose Route Frequency Provider Last Rate Last Admin    cyclobenzaprine (FLEXERIL) tablet 10 mg  10 mg Oral TID Rosangela Perez DO   10 mg at 10/07/24 2010    ibuprofen (ADVIL;MOTRIN) tablet 400 mg  400 mg Oral Q6H Rosangela Perez, DO   400 mg at

## 2024-10-08 NOTE — DISCHARGE INSTR - COC
of opioid abuse ([facility]) F11.11    Intra-abdominal free air of unknown etiology K66.8    Perforation of sigmoid colon due to diverticulitis K57.20    Intra-abdominal abscess ([facility]) K65.1       Isolation/Infection:   Isolation            No Isolation          Patient Infection Status       None to display                     Nurse Assessment:  Last Vital Signs: /80   Pulse 72   Temp 98.2 °F (36.8 °C) (Oral)   Resp 18   Wt 71.2 kg (156 lb 14.4 oz)   SpO2 92%   BMI 26.11 kg/m²     Last documented pain score (0-10 scale): Pain Level: 7  Last Weight:   Wt Readings from Last 1 Encounters:   10/08/24 71.2 kg (156 lb 14.4 oz)     Mental Status:  {IP PT MENTAL STATUS:20030}    IV Access:  { DELISA IV ACCESS:184697736}    Nursing Mobility/ADLs:  Walking   {CHP DME ADLs:649593439}  Transfer  {CHP DME ADLs:755204054}  Bathing  {CHP DME ADLs:660316566}  Dressing  {CHP DME ADLs:337899228}  Toileting  {CHP DME ADLs:186912641}  Feeding  {CHP DME ADLs:727301814}  Med Admin  {P DME ADLs:697675482}  Med Delivery   {[facility] MED Delivery:589489577}    Wound Care Documentation and Therapy:  Incision 10/02/24 Abdomen Medial (Active)   Dressing Status Clean;Dry;Intact 10/07/24 2000   Dressing Change Due 10/08/24 10/07/24 2000   Incision Cleansed Not Cleansed 10/07/24 2000   Dressing/Treatment Packing/iodaform packing;Gauze dressing/dressing sponge;ABD pad 10/05/24 2000   Incision Assessment Dry 10/07/24 0800   Drainage Amount Scant (moist but unmeasurable) 10/07/24 0800   Drainage Description Serosanguinous 10/07/24 0800   Odor None 10/07/24 0800   Number of days: 6       Incision 09/02/20 Back Lower;Medial (Active)   Number of days: 1497        Elimination:  Continence:   Bowel: {YES / NO:19727}  Bladder: {YES / NO:19727}  Urinary Catheter: {Urinary Catheter:076850331}   Colostomy/Ileostomy/Ileal Conduit: {YES / NO:19727}  Colostomy LLQ Descending/sigmoid-Stomal Appliance: 1 piece, Changed  Colostomy LLQ Descending/sigmoid-Stoma       Patient's personal belongings (please select all that are sent with patient):  {CHP DME Belongings:159032806}    RN SIGNATURE:  {Esignature:187686269}    CASE MANAGEMENT/SOCIAL WORK SECTION    Inpatient Status Date: ***    Readmission Risk Assessment Score:  Readmission Risk              Risk of Unplanned Readmission:  15           Discharging to Facility/ Agency   Name: [facility]    Services Available   Home Health Services      Address   [address] [place] [address]             Contact Information    [phone]          / signature: Electronically signed by SHUKRI FISCHER RN on 10/8/24 at 2:02 PM EDT    PHYSICIAN SECTION    Prognosis: Good    Condition at Discharge: Stable    Rehab Potential (if transferring to Rehab): Good    Recommended Labs or Other Treatments After Discharge: Care of GALDINO drain and colostomy    Physician Certification: I certify the above information and transfer of Abdifatah De Anda  is necessary for the continuing treatment of the diagnosis listed and that he requires Home Care for greater 30 days.     Update Admission H&P: No change in H&P    PHYSICIAN SIGNATURE:  Electronically signed by Aileen Lloyd MD on 10/10/24 at 10:39 AM EDT

## 2024-10-09 LAB
ANION GAP SERPL CALCULATED.3IONS-SCNC: 13 MMOL/L (ref 9–17)
BASOPHILS # BLD: 0.06 K/UL (ref 0–0.2)
BASOPHILS NFR BLD: 1 % (ref 0–2)
BUN SERPL-MCNC: 4 MG/DL (ref 6–20)
BUN/CREAT SERPL: 8 (ref 9–20)
CALCIUM SERPL-MCNC: 8.2 MG/DL (ref 8.6–10.4)
CHLORIDE SERPL-SCNC: 100 MMOL/L (ref 98–107)
CO2 SERPL-SCNC: 27 MMOL/L (ref 20–31)
CREAT SERPL-MCNC: 0.5 MG/DL (ref 0.7–1.2)
EOSINOPHIL # BLD: 0.26 K/UL (ref 0–0.44)
EOSINOPHILS RELATIVE PERCENT: 2 % (ref 1–4)
ERYTHROCYTE [DISTWIDTH] IN BLOOD BY AUTOMATED COUNT: 14 % (ref 11.8–14.4)
GFR, ESTIMATED: >90 ML/MIN/1.73M2
GLUCOSE SERPL-MCNC: 92 MG/DL (ref 70–99)
HCT VFR BLD AUTO: 32.3 % (ref 40.7–50.3)
HGB BLD-MCNC: 10.9 G/DL (ref 13–17)
IMM GRANULOCYTES # BLD AUTO: 0.25 K/UL (ref 0–0.3)
IMM GRANULOCYTES NFR BLD: 2 %
LYMPHOCYTES NFR BLD: 1.24 K/UL (ref 1.1–3.7)
LYMPHOCYTES RELATIVE PERCENT: 10 % (ref 24–43)
MAGNESIUM SERPL-MCNC: 1.7 MG/DL (ref 1.6–2.6)
MCH RBC QN AUTO: 32 PG (ref 25.2–33.5)
MCHC RBC AUTO-ENTMCNC: 33.7 G/DL (ref 28.4–34.8)
MCV RBC AUTO: 94.7 FL (ref 82.6–102.9)
MONOCYTES NFR BLD: 1.12 K/UL (ref 0.1–1.2)
MONOCYTES NFR BLD: 9 % (ref 3–12)
NEUTROPHILS NFR BLD: 76 % (ref 36–65)
NEUTS SEG NFR BLD: 10.06 K/UL (ref 1.5–8.1)
NRBC BLD-RTO: 0 PER 100 WBC
PLATELET # BLD AUTO: 443 K/UL (ref 138–453)
PMV BLD AUTO: 9.6 FL (ref 8.1–13.5)
POTASSIUM SERPL-SCNC: 3.5 MMOL/L (ref 3.7–5.3)
RBC # BLD AUTO: 3.41 M/UL (ref 4.21–5.77)
SODIUM SERPL-SCNC: 140 MMOL/L (ref 135–144)
WBC OTHER # BLD: 13 K/UL (ref 3.5–11.3)

## 2024-10-09 PROCEDURE — 36415 COLL VENOUS BLD VENIPUNCTURE: CPT

## 2024-10-09 PROCEDURE — 83735 ASSAY OF MAGNESIUM: CPT

## 2024-10-09 PROCEDURE — 80048 BASIC METABOLIC PNL TOTAL CA: CPT

## 2024-10-09 PROCEDURE — 99232 SBSQ HOSP IP/OBS MODERATE 35: CPT | Performed by: INTERNAL MEDICINE

## 2024-10-09 PROCEDURE — 94761 N-INVAS EAR/PLS OXIMETRY MLT: CPT

## 2024-10-09 PROCEDURE — 2580000003 HC RX 258: Performed by: SURGERY

## 2024-10-09 PROCEDURE — 1200000000 HC SEMI PRIVATE

## 2024-10-09 PROCEDURE — 6360000002 HC RX W HCPCS: Performed by: INTERNAL MEDICINE

## 2024-10-09 PROCEDURE — 6360000002 HC RX W HCPCS: Performed by: FAMILY MEDICINE

## 2024-10-09 PROCEDURE — 6370000000 HC RX 637 (ALT 250 FOR IP): Performed by: INTERNAL MEDICINE

## 2024-10-09 PROCEDURE — 6370000000 HC RX 637 (ALT 250 FOR IP)

## 2024-10-09 PROCEDURE — 2580000003 HC RX 258: Performed by: INTERNAL MEDICINE

## 2024-10-09 PROCEDURE — 6370000000 HC RX 637 (ALT 250 FOR IP): Performed by: NURSE PRACTITIONER

## 2024-10-09 PROCEDURE — 99232 SBSQ HOSP IP/OBS MODERATE 35: CPT | Performed by: NURSE PRACTITIONER

## 2024-10-09 PROCEDURE — 85025 COMPLETE CBC W/AUTO DIFF WBC: CPT

## 2024-10-09 PROCEDURE — 6370000000 HC RX 637 (ALT 250 FOR IP): Performed by: FAMILY MEDICINE

## 2024-10-09 PROCEDURE — 6370000000 HC RX 637 (ALT 250 FOR IP): Performed by: STUDENT IN AN ORGANIZED HEALTH CARE EDUCATION/TRAINING PROGRAM

## 2024-10-09 RX ORDER — ONDANSETRON 4 MG/1
4 TABLET, ORALLY DISINTEGRATING ORAL 3 TIMES DAILY PRN
Qty: 21 TABLET | Refills: 0 | Status: SHIPPED | OUTPATIENT
Start: 2024-10-09 | End: 2024-10-09

## 2024-10-09 RX ORDER — POTASSIUM CHLORIDE 1500 MG/1
40 TABLET, EXTENDED RELEASE ORAL PRN
Status: DISCONTINUED | OUTPATIENT
Start: 2024-10-09 | End: 2024-10-10 | Stop reason: HOSPADM

## 2024-10-09 RX ORDER — POTASSIUM CHLORIDE 7.45 MG/ML
10 INJECTION INTRAVENOUS PRN
Status: DISCONTINUED | OUTPATIENT
Start: 2024-10-09 | End: 2024-10-10 | Stop reason: HOSPADM

## 2024-10-09 RX ORDER — CEFUROXIME AXETIL 500 MG/1
500 TABLET ORAL 2 TIMES DAILY
Qty: 14 TABLET | Refills: 0 | Status: SHIPPED | OUTPATIENT
Start: 2024-10-09 | End: 2024-10-09

## 2024-10-09 RX ORDER — CIPROFLOXACIN 500 MG/1
500 TABLET, FILM COATED ORAL 2 TIMES DAILY
Qty: 14 TABLET | Refills: 0 | Status: SHIPPED | OUTPATIENT
Start: 2024-10-09 | End: 2024-10-09

## 2024-10-09 RX ORDER — LISINOPRIL 40 MG/1
40 TABLET ORAL DAILY
Qty: 30 TABLET | Refills: 3 | Status: SHIPPED | OUTPATIENT
Start: 2024-10-10 | End: 2024-10-09

## 2024-10-09 RX ORDER — FLUCONAZOLE 200 MG/1
200 TABLET ORAL DAILY
Qty: 7 TABLET | Refills: 0 | Status: SHIPPED | OUTPATIENT
Start: 2024-10-10 | End: 2024-10-09

## 2024-10-09 RX ORDER — ONDANSETRON 4 MG/1
4 TABLET, ORALLY DISINTEGRATING ORAL 3 TIMES DAILY PRN
Qty: 21 TABLET | Refills: 0 | Status: SHIPPED | OUTPATIENT
Start: 2024-10-09 | End: 2024-10-16

## 2024-10-09 RX ORDER — METRONIDAZOLE 500 MG/1
500 TABLET ORAL EVERY 8 HOURS SCHEDULED
Qty: 21 TABLET | Refills: 0 | Status: SHIPPED | OUTPATIENT
Start: 2024-10-09 | End: 2024-10-09

## 2024-10-09 RX ORDER — CEFUROXIME AXETIL 500 MG/1
500 TABLET ORAL 2 TIMES DAILY
Qty: 14 TABLET | Refills: 0 | Status: SHIPPED | OUTPATIENT
Start: 2024-10-09 | End: 2024-10-09 | Stop reason: HOSPADM

## 2024-10-09 RX ORDER — DOCUSATE SODIUM 100 MG/1
100 CAPSULE, LIQUID FILLED ORAL 2 TIMES DAILY
Qty: 20 CAPSULE | Refills: 0 | Status: SHIPPED | OUTPATIENT
Start: 2024-10-09 | End: 2024-10-09

## 2024-10-09 RX ORDER — CIPROFLOXACIN 500 MG/1
500 TABLET, FILM COATED ORAL 2 TIMES DAILY
Qty: 14 TABLET | Refills: 0 | Status: SHIPPED | OUTPATIENT
Start: 2024-10-09 | End: 2024-10-16

## 2024-10-09 RX ORDER — METRONIDAZOLE 500 MG/1
500 TABLET ORAL EVERY 8 HOURS SCHEDULED
Qty: 21 TABLET | Refills: 0 | Status: SHIPPED | OUTPATIENT
Start: 2024-10-09 | End: 2024-10-16

## 2024-10-09 RX ORDER — DOCUSATE SODIUM 100 MG/1
100 CAPSULE, LIQUID FILLED ORAL 2 TIMES DAILY
Qty: 20 CAPSULE | Refills: 0 | Status: SHIPPED | OUTPATIENT
Start: 2024-10-09 | End: 2024-10-19

## 2024-10-09 RX ORDER — PANTOPRAZOLE SODIUM 40 MG/1
40 TABLET, DELAYED RELEASE ORAL
Qty: 30 TABLET | Refills: 3 | Status: SHIPPED | OUTPATIENT
Start: 2024-10-10 | End: 2024-10-09 | Stop reason: HOSPADM

## 2024-10-09 RX ORDER — OXYCODONE HYDROCHLORIDE 5 MG/1
5 TABLET ORAL EVERY 6 HOURS PRN
Qty: 28 TABLET | Refills: 0 | Status: SHIPPED | OUTPATIENT
Start: 2024-10-09 | End: 2024-10-10

## 2024-10-09 RX ORDER — LISINOPRIL 40 MG/1
40 TABLET ORAL DAILY
Qty: 30 TABLET | Refills: 1 | Status: SHIPPED | OUTPATIENT
Start: 2024-10-10

## 2024-10-09 RX ORDER — FLUCONAZOLE 200 MG/1
200 TABLET ORAL DAILY
Qty: 7 TABLET | Refills: 0 | Status: SHIPPED | OUTPATIENT
Start: 2024-10-10 | End: 2024-10-17

## 2024-10-09 RX ADMIN — CYCLOBENZAPRINE 10 MG: 10 TABLET, FILM COATED ORAL at 20:27

## 2024-10-09 RX ADMIN — ACETAMINOPHEN 1000 MG: 500 TABLET ORAL at 16:12

## 2024-10-09 RX ADMIN — TRAZODONE HYDROCHLORIDE 100 MG: 100 TABLET ORAL at 01:55

## 2024-10-09 RX ADMIN — SODIUM CHLORIDE, PRESERVATIVE FREE 10 ML: 5 INJECTION INTRAVENOUS at 20:27

## 2024-10-09 RX ADMIN — GABAPENTIN 300 MG: 300 CAPSULE ORAL at 23:06

## 2024-10-09 RX ADMIN — WATER 1000 MG: 1 INJECTION INTRAMUSCULAR; INTRAVENOUS; SUBCUTANEOUS at 11:54

## 2024-10-09 RX ADMIN — CYCLOBENZAPRINE 10 MG: 10 TABLET, FILM COATED ORAL at 09:47

## 2024-10-09 RX ADMIN — OXYCODONE HYDROCHLORIDE 10 MG: 5 TABLET ORAL at 16:11

## 2024-10-09 RX ADMIN — ACETAMINOPHEN 1000 MG: 500 TABLET ORAL at 23:06

## 2024-10-09 RX ADMIN — ENOXAPARIN SODIUM 40 MG: 100 INJECTION SUBCUTANEOUS at 09:48

## 2024-10-09 RX ADMIN — IBUPROFEN 400 MG: 400 TABLET, FILM COATED ORAL at 14:58

## 2024-10-09 RX ADMIN — IBUPROFEN 400 MG: 400 TABLET, FILM COATED ORAL at 01:55

## 2024-10-09 RX ADMIN — METRONIDAZOLE 500 MG: 500 TABLET ORAL at 20:27

## 2024-10-09 RX ADMIN — LORAZEPAM 1 MG: 2 INJECTION INTRAMUSCULAR; INTRAVENOUS at 02:46

## 2024-10-09 RX ADMIN — OXYCODONE HYDROCHLORIDE 10 MG: 5 TABLET ORAL at 07:29

## 2024-10-09 RX ADMIN — METRONIDAZOLE 500 MG: 500 TABLET ORAL at 06:43

## 2024-10-09 RX ADMIN — LORAZEPAM 1 MG: 2 INJECTION INTRAMUSCULAR; INTRAVENOUS at 18:24

## 2024-10-09 RX ADMIN — IBUPROFEN 400 MG: 400 TABLET, FILM COATED ORAL at 09:47

## 2024-10-09 RX ADMIN — LISINOPRIL 40 MG: 40 TABLET ORAL at 09:47

## 2024-10-09 RX ADMIN — OXYCODONE HYDROCHLORIDE 10 MG: 5 TABLET ORAL at 23:58

## 2024-10-09 RX ADMIN — SODIUM CHLORIDE, PRESERVATIVE FREE 10 ML: 5 INJECTION INTRAVENOUS at 09:49

## 2024-10-09 RX ADMIN — CYCLOBENZAPRINE 10 MG: 10 TABLET, FILM COATED ORAL at 14:58

## 2024-10-09 RX ADMIN — FLUCONAZOLE 400 MG: 100 TABLET ORAL at 11:53

## 2024-10-09 RX ADMIN — GABAPENTIN 300 MG: 300 CAPSULE ORAL at 06:43

## 2024-10-09 RX ADMIN — OXYCODONE HYDROCHLORIDE 10 MG: 5 TABLET ORAL at 20:26

## 2024-10-09 RX ADMIN — OXYCODONE HYDROCHLORIDE 10 MG: 5 TABLET ORAL at 11:53

## 2024-10-09 RX ADMIN — OXYCODONE HYDROCHLORIDE 10 MG: 5 TABLET ORAL at 03:06

## 2024-10-09 RX ADMIN — ACETAMINOPHEN 1000 MG: 500 TABLET ORAL at 06:43

## 2024-10-09 RX ADMIN — IBUPROFEN 400 MG: 400 TABLET, FILM COATED ORAL at 20:27

## 2024-10-09 RX ADMIN — PANTOPRAZOLE SODIUM 40 MG: 40 TABLET, DELAYED RELEASE ORAL at 06:43

## 2024-10-09 RX ADMIN — POTASSIUM CHLORIDE 40 MEQ: 1500 TABLET, EXTENDED RELEASE ORAL at 09:47

## 2024-10-09 RX ADMIN — GABAPENTIN 300 MG: 300 CAPSULE ORAL at 14:58

## 2024-10-09 RX ADMIN — METRONIDAZOLE 500 MG: 500 TABLET ORAL at 14:58

## 2024-10-09 ASSESSMENT — PAIN DESCRIPTION - ORIENTATION
ORIENTATION: RIGHT;LEFT;MID
ORIENTATION: MID;LEFT;LOWER
ORIENTATION: RIGHT;LEFT;LOWER
ORIENTATION: LOWER;LEFT;MID

## 2024-10-09 ASSESSMENT — PAIN - FUNCTIONAL ASSESSMENT
PAIN_FUNCTIONAL_ASSESSMENT: ACTIVITIES ARE NOT PREVENTED
PAIN_FUNCTIONAL_ASSESSMENT: PREVENTS OR INTERFERES SOME ACTIVE ACTIVITIES AND ADLS
PAIN_FUNCTIONAL_ASSESSMENT: PREVENTS OR INTERFERES SOME ACTIVE ACTIVITIES AND ADLS
PAIN_FUNCTIONAL_ASSESSMENT: ACTIVITIES ARE NOT PREVENTED
PAIN_FUNCTIONAL_ASSESSMENT: PREVENTS OR INTERFERES SOME ACTIVE ACTIVITIES AND ADLS
PAIN_FUNCTIONAL_ASSESSMENT: ACTIVITIES ARE NOT PREVENTED
PAIN_FUNCTIONAL_ASSESSMENT: ACTIVITIES ARE NOT PREVENTED

## 2024-10-09 ASSESSMENT — PAIN SCALES - GENERAL
PAINLEVEL_OUTOF10: 7
PAINLEVEL_OUTOF10: 4
PAINLEVEL_OUTOF10: 2
PAINLEVEL_OUTOF10: 7
PAINLEVEL_OUTOF10: 8
PAINLEVEL_OUTOF10: 6
PAINLEVEL_OUTOF10: 5
PAINLEVEL_OUTOF10: 6
PAINLEVEL_OUTOF10: 3
PAINLEVEL_OUTOF10: 4
PAINLEVEL_OUTOF10: 7
PAINLEVEL_OUTOF10: 3
PAINLEVEL_OUTOF10: 6
PAINLEVEL_OUTOF10: 7

## 2024-10-09 ASSESSMENT — PAIN DESCRIPTION - LOCATION
LOCATION: ABDOMEN

## 2024-10-09 ASSESSMENT — PAIN DESCRIPTION - PAIN TYPE
TYPE: SURGICAL PAIN
TYPE: ACUTE PAIN
TYPE: SURGICAL PAIN

## 2024-10-09 ASSESSMENT — PAIN DESCRIPTION - DESCRIPTORS
DESCRIPTORS: STABBING;SHARP
DESCRIPTORS: ACHING;DISCOMFORT;CRAMPING
DESCRIPTORS: ACHING;DISCOMFORT;CRAMPING
DESCRIPTORS: ACHING;CRAMPING;DISCOMFORT
DESCRIPTORS: ACHING;DISCOMFORT;CRAMPING
DESCRIPTORS: ACHING;DISCOMFORT;CRAMPING
DESCRIPTORS: ACHING;CRAMPING;DISCOMFORT
DESCRIPTORS: ACHING;DISCOMFORT;CRAMPING
DESCRIPTORS: SORE;SHARP;STABBING

## 2024-10-09 ASSESSMENT — PAIN DESCRIPTION - ONSET
ONSET: ON-GOING

## 2024-10-09 ASSESSMENT — PAIN DESCRIPTION - FREQUENCY
FREQUENCY: CONTINUOUS

## 2024-10-09 ASSESSMENT — ENCOUNTER SYMPTOMS
NAUSEA: 0
ABDOMINAL PAIN: 1
RESPIRATORY NEGATIVE: 1

## 2024-10-09 NOTE — PROGRESS NOTES
Comprehensive Nutrition Assessment    Type and Reason for Visit:  Reassess    Nutrition Recommendations/Plan:   Continue current diet and POC  Continue Ensure Clear BID  RD to follow/monitor     Malnutrition Assessment:  Malnutrition Status:  At risk for malnutrition (Comment) (10/03/24 6073)        Nutrition Assessment:    Patient POD 7 s/p exp lap with colostomy. Plan is for discharge today or tomorrow. He still has abdominal pain but is tolerating diet and moving around. Labs, meds, PMH reviewed.    Nutrition Related Findings:    Weight is stable. LBM 10/8, no edema noted, skin intact. K+ 3.5. Wound Type: None       Current Nutrition Intake & Therapies:    Average Meal Intake: NPO  Average Supplements Intake: NPO  ADULT DIET; Regular; Low Fiber  ADULT ORAL NUTRITION SUPPLEMENT; Breakfast, Dinner; Clear Liquid Oral Supplement    Anthropometric Measures:  Height:    Ideal Body Weight (IBW):   ( )       Current Body Weight: 70.8 kg (156 lb),   IBW.    Current BMI (kg/m2): 26                          BMI Categories: Overweight (BMI 25.0-29.9)    Estimated Daily Nutrient Needs:  Energy Requirements Based On: Kcal/kg     Energy (kcal/day): 8939-2227 (20-25)  Weight Used for Protein Requirements: Current  Protein (g/day):  (1-1.5)  Method Used for Fluid Requirements: 1 ml/kcal  Fluid (ml/day): 7160-4793 (20-25)    Nutrition Diagnosis:   Inadequate oral intake related to altered GI function as evidenced by NPO or clear liquid status due to medical condition    Nutrition Interventions:      Nutrition Education/Counseling: Education not indicated  Coordination of Nutrition Care: Continue to monitor while inpatient       Goals:     Goals: PO intake 50% or greater       Nutrition Monitoring and Evaluation:   Behavioral-Environmental Outcomes: None Identified  Food/Nutrient Intake Outcomes: Diet Advancement/Tolerance, Food and Nutrient Intake  Physical Signs/Symptoms Outcomes: Biochemical Data, Skin, Nutrition Focused

## 2024-10-09 NOTE — PROGRESS NOTES
Spiritual Health History and Assessment/Progress Note  [facility]    (P) Follow-up,  ,  ,      Name: Abdifatah De Anda MRN: [id number]    Age: 39 y.o.     Sex: male   Language: English   Religious: None   Perforation of sigmoid colon due to diverticulitis     Date: 10/9/2024            Total Time Calculated: (P) 6 min              Spiritual Assessment continued in [facility]        Referral/Consult From: (P) Rounding   Encounter Overview/Reason: (P) Follow-up  Service Provided For: (P) Patient  Patient stated being an atheist.  Stella, Belief, Meaning:   Patient Other: no stella beliefs, except atheism  Family/Friends No family/friends present      Importance and Influence:  Patient has no beliefs influential to healthcare decision-making identified during this visit  Family/Friends No family/friends present    Community:  Patient feels well-supported. Support system includes: Friends  Family/Friends No family/friends present    Assessment and Plan of Care:     Patient Interventions include: Facilitated expression of thoughts and feelings  Family/Friends Interventions include: No family/friends present    Patient Plan of Care: Spiritual Care available upon further referral  Family/Friends Plan of Care: Spiritual Care available upon further referral    Electronically signed by Chaplain Clay on 10/9/2024 at 12:07 PM

## 2024-10-09 NOTE — PROGRESS NOTES
[facility]  Progress Note    Abdifatah De Anda  MRN: [id number]  Date: 10/9/2024  LOS: 8     Reason for F/U :   Perforated diverticulitis    Impression :   Acute complicated/perforated diverticulitis  Status post Holder's procedure with end colostomy creation 10/2/2024  Postoperative abdominal pain with leukocytosis  Follow-up CT without any abscess  Rheumatoid arthritis/ankylosing spondylitis  Essential hypertension  History of substance abuse    Recommendations:   The patient continues on IV antimicrobial therapy with ceftriaxone, metronidazole, fluconazole  Patient is doing well clinically, states he is tolerating oral intake  The plan at discharge will be to transition the patient to oral antibiotic therapy    Infection Control Recommendations:   Universal precautions    Discharge Planning:   Estimated Length of IV antimicrobials: While hospitalized  Patient will need Midline Catheter Insertion/ PICC line Insertion: No  Patient will need: Home IV , Infusion Center,  SNF,  LTAC: Undetermined  Patient willneed outpatient wound care: No    Medical Decision making / Summary of Stay:   Abdifatah De Anda is a 39 y.o.-year-old male who was initially admitted on 10/1/2024.   Abdifatah has a rheumatoid arthritis, ankylosing spondylolysis, hypertension, anxiety disorder, tobacco abuse, EtOH abuse who has been having abdominal pain for 1 to 2 weeks and [facility] scan showing sigmoid diverticulitis without any complications and the patient reports that after being hospitalized for a day or 2 for observation the patient was discharged on oral antimicrobial therapy with ciprofloxacin and metronidazole though he still reported significant pain at that time.  The patient ended up coming to the emergency room here at [facility] 9/27/2024 and a CT scan done at that point in time showed significant inflammatory stranding posterior to the mid sigmoid colon mildly

## 2024-10-09 NOTE — PROGRESS NOTES
[facility]  Office: [phone]  Fady Flores, DO, Chris Medina, DO, Nick Yarbrough DO, Gary Wei, DO, Aileen Lloyd MD, Marie Astorga MD, Jacob Kelly MD, Zaira Estrella MD,  German Ramos MD, Vi Castillo MD, Sigifredo Schwartz MD,  Denice Streeter DO, Sebastián Suggs MD, Tye Boateng MD, Stan Flores DO, Nida Wilhelm MD,  Damir Herrera DO, Mallory Brasher MD, Rosana Escalante MD, Conchis Jiménez MD, Salazar Trinh MD,  Santos Rai MD, Jonna Delgado MD, Raymundo Ambrocio MD, Silver Fisher MD, Angel Dhaliwal MD, Michael Lewis MD, Maxwell Sifuentes, DO, Camron Salas DO, Dillon Brizuela DO, Delvin Leija MD, Shirley Waterhouse, CNP,  Kasie Early CNP, Maxwell Lugo, CNP,  Desiree Hannah, DNP, Stacie Moss, CNP, Rosagnela Kennedy, CNP, Lauren Franco, CNP, Layla Fritz, CNP, Leandra Vega, PA-C, Ivory Cool, PA-C, Taina Cunningham, CNP, Denys Pond, CNP,  Anusha Romero, CNP, Twila Montgomery, CNP, Gretchen Rudolph, CNP, Maria De Jesus Sebastian, CNS, Michelle Thapa, CNP, Dulce Sky, CNP, Tracy Schwab, CNP         [facility]   IN-PATIENT SERVICE   [facility]    Progress Note    10/9/2024    1:29 PM    Name:   Abdifatah De Anda  MRN:     [id number]     Acct:      [id number]   Room:   [facility]1004-02   Day:  8  Admit Date:  10/1/2024  9:14 PM    PCP:   Brennon Reyes DO  Code Status:  Full Code    Subjective:     C/C:   Chief Complaint   Patient presents with    Abdominal Pain     Pt was d/c from facility at 1300 today, returned back to facility 1500 as he began to feel worse again with his symptoms. Patient reports he is unable to eat. Denies having a BM. Denies urination complications.      Interval History Status: .   Denies abdominal pain today  Tolerating low fiber diet  Having liquid bowel movements in colostomy bag  Colostomy bag/dressing changed by ostomy nurse   Still Has GALDINO drain in right lower quadrant  Goes out to smoke  Brief History:     Abdifatah De Anda  injury. 2. Mild atherosclerosis given the patient's age. 3. Small bilateral pleural effusions with left base opacity which may represent atelectasis or infection.       Physical Examination:        General appearance:  alert, cooperative and no distress  Mental Status:  oriented to person, place and time and normal affect  Lungs:  clear to auscultation bilaterally, normal effort  Heart:  regular rate and rhythm, no murmur  Abdomen:  + Midline staples, abdomen soft, nontender, nondistended, normal bowel sounds, + colostomy  bag in place,+ GALDINO drain in right lower quadrant with serous fluid  Extremities:  no edema, redness, tenderness in the calves  Skin:  no gross lesions, rashes, induration    Assessment:        Hospital Problems             Last Modified POA    * (Principal) Perforation of sigmoid colon due to diverticulitis 10/4/2024 Yes    Ankylosing spondylitis ([facility]) 10/4/2024 Yes    Arthritis, rheumatoid ([facility]) 10/4/2024 Yes    Alcohol abuse 10/4/2024 Yes    Benign essential hypertension 10/4/2024 Yes    Intra-abdominal free air of unknown etiology 10/4/2024 Yes    Intra-abdominal abscess ([facility]) 10/7/2024 Yes       Plan:        Acute sigmoid diverticulitis with perforation and peritonitis and pelvic abscess  S/p exploratory laparotomy with sigmoid resection, drainage of abscess and colostomy creation on 10/2/2024.  Continue IV antibiotics, bowel function has returned.  Continues to have GALDINO drain output-decreased in volume  Alcohol Abuse -controlled..    Rheumatoid arthritis / Ankylosing Spondylitis - not on medications.   Essential hypertension-continue lisinopril 40 mg daily  Smoker-refuses nicotine patch  Tolerating low fiber diet  surgery plans to discharge in next 24-48 hours after removing GALDINO drain       Aileen Lloyd MD  10/9/2024  1:29 PM

## 2024-10-09 NOTE — PLAN OF CARE
Pt continues to c/o abdominal pain/cramping. PRN oxy continues per order. Pt ambulating without difficulty, going off unit at times. Dr. Perez in this morning and states pt possibly to discharge today. Pt advises nurse that he would like meds sent to [facility] due to not being able to drive. Notified Dr. Lloyd and Dr. Russell and requested meds be cancelled at current pharmacy and resent to [facility]. Per pharmacy, still waiting for scripts. Pt notified and acknowledges he cannot discharge without pain meds. Dr. Lloyd aware of pt waiting for discharge meds and is OK with pt discharging Thursday. Dressing changed to abdominal incision. Staples intact. GALDINO drain had 30mls of serosanguinous drainage. Safety maintained.       Problem: Pain  Goal: Verbalizes/displays adequate comfort level or baseline comfort level  Outcome: Progressing     Problem: Discharge Planning  Goal: Discharge to home or other facility with appropriate resources  Outcome: Progressing     Problem: Safety - Adult  Goal: Free from fall injury  Outcome: Progressing     Problem: Gastrointestinal - Adult  Goal: Minimal or absence of nausea and vomiting  Outcome: Progressing     Problem: Gastrointestinal - Adult  Goal: Establish and maintain optimal ostomy function  Outcome: Progressing     Problem: Anxiety  Goal: Will report anxiety at manageable levels  Outcome: Progressing

## 2024-10-09 NOTE — CARE COORDINATION
Discharge planning    Dr. Patel to follow for home care. Pt. Is not current with a PCP. List provided. [facility] accepted.

## 2024-10-09 NOTE — PROGRESS NOTES
General Surgery:  Daily Progress Note          PATIENT NAME: Abdifatah De Anda     TODAY'S DATE: 10/9/2024, 6:32 AM    SUBJECTIVE:     Pt seen and examined at bedside.  No acute overnight events. Afebrile, vitals normal and stable. Tolerating PO. Continues to have abdominal pain that he rates 8/10. Walking outside to smoke - ambulating well on his own.     OBJECTIVE:   VITALS:  BP (!) 151/96   Pulse 88   Temp 98.4 °F (36.9 °C)   Resp 18   Wt 71.2 kg (156 lb 14.4 oz)   SpO2 94%   BMI 26.11 kg/m²      INTAKE/OUTPUT:      Intake/Output Summary (Last 24 hours) at 10/9/2024 0632  Last data filed at 10/8/2024 2000  Gross per 24 hour   Intake 1497.73 ml   Output 250 ml   Net 1247.73 ml       PHYSICAL EXAM:  General Appearance: awake, alert, oriented, in no acute distress  HEENT:  Normocephalic, atraumatic, mucus membranes moist   Heart: Regular rate and rhythm  Lungs: normal effort with symmetric rise and fall of chest wall  Abdomen: softly distended, TTP around incision and GALDINO drain. Incision clean dry and intact with staples; stoma pink and functioning well.  Extremities: No cyanosis, pitting edema, rashes noted.    Skin: Skin color, texture, turgor normal. No rashes or lesions.      Data:  CBC:   Recent Labs     10/07/24  0529 10/08/24  0506 10/09/24  0450   WBC 17.1* 14.5* 13.0*   HGB 11.0* 11.3* 10.9*    431 443     Chemistry:   Recent Labs     10/07/24  0529 10/08/24  0506 10/09/24  0450    137 140   K 3.7 3.8 3.5*   CL 99 99 100   CO2 27 29 27   GLUCOSE 88 97 92   BUN 3* 4* 4*   CREATININE 0.5* 0.5* 0.5*   MG 1.7 1.8 1.7   ANIONGAP 12 9 13   LABGLOM >90 >90 >90   CALCIUM 8.3* 8.5* 8.2*   CAION 1.22  --   --      Hepatic: No results for input(s): \"AST\", \"ALT\", \"ALKPHOS\", \"BILITOT\", \"BILIDIR\" in the last 72 hours.    Invalid input(s): \"ALB\"  Coagulation: No results for input(s): \"APTT\", \"INR\" in the last 72 hours.    Invalid input(s): \"PROT\"      Radiology Review:    N new imaging to

## 2024-10-10 VITALS
HEART RATE: 96 BPM | SYSTOLIC BLOOD PRESSURE: 150 MMHG | OXYGEN SATURATION: 96 % | WEIGHT: 156.9 LBS | BODY MASS INDEX: 26.11 KG/M2 | DIASTOLIC BLOOD PRESSURE: 99 MMHG | TEMPERATURE: 98.1 F | RESPIRATION RATE: 16 BRPM

## 2024-10-10 LAB
ANION GAP SERPL CALCULATED.3IONS-SCNC: 9 MMOL/L (ref 9–17)
BASOPHILS # BLD: 0.07 K/UL (ref 0–0.2)
BASOPHILS NFR BLD: 1 % (ref 0–2)
BUN SERPL-MCNC: 4 MG/DL (ref 6–20)
BUN/CREAT SERPL: ABNORMAL (ref 9–20)
CA-I BLD-SCNC: 1.21 MMOL/L (ref 1.13–1.33)
CALCIUM SERPL-MCNC: 8.4 MG/DL (ref 8.6–10.4)
CHLORIDE SERPL-SCNC: 101 MMOL/L (ref 98–107)
CO2 SERPL-SCNC: 27 MMOL/L (ref 20–31)
CREAT SERPL-MCNC: <0.4 MG/DL (ref 0.7–1.2)
EOSINOPHIL # BLD: 0.27 K/UL (ref 0–0.44)
EOSINOPHILS RELATIVE PERCENT: 3 % (ref 1–4)
ERYTHROCYTE [DISTWIDTH] IN BLOOD BY AUTOMATED COUNT: 13.8 % (ref 11.8–14.4)
GFR, ESTIMATED: ABNORMAL ML/MIN/1.73M2
GLUCOSE SERPL-MCNC: 102 MG/DL (ref 70–99)
HCT VFR BLD AUTO: 32.2 % (ref 40.7–50.3)
HGB BLD-MCNC: 11.1 G/DL (ref 13–17)
IMM GRANULOCYTES # BLD AUTO: 0.26 K/UL (ref 0–0.3)
IMM GRANULOCYTES NFR BLD: 2 %
LYMPHOCYTES NFR BLD: 1.26 K/UL (ref 1.1–3.7)
LYMPHOCYTES RELATIVE PERCENT: 12 % (ref 24–43)
MAGNESIUM SERPL-MCNC: 1.7 MG/DL (ref 1.6–2.6)
MCH RBC QN AUTO: 31.4 PG (ref 25.2–33.5)
MCHC RBC AUTO-ENTMCNC: 34.5 G/DL (ref 28.4–34.8)
MCV RBC AUTO: 91.2 FL (ref 82.6–102.9)
MONOCYTES NFR BLD: 1.02 K/UL (ref 0.1–1.2)
MONOCYTES NFR BLD: 9 % (ref 3–12)
NEUTROPHILS NFR BLD: 73 % (ref 36–65)
NEUTS SEG NFR BLD: 8.07 K/UL (ref 1.5–8.1)
NRBC BLD-RTO: 0 PER 100 WBC
PLATELET # BLD AUTO: 464 K/UL (ref 138–453)
PMV BLD AUTO: 9.7 FL (ref 8.1–13.5)
POTASSIUM SERPL-SCNC: 3.7 MMOL/L (ref 3.7–5.3)
RBC # BLD AUTO: 3.53 M/UL (ref 4.21–5.77)
SODIUM SERPL-SCNC: 137 MMOL/L (ref 135–144)
WBC OTHER # BLD: 11 K/UL (ref 3.5–11.3)

## 2024-10-10 PROCEDURE — 6370000000 HC RX 637 (ALT 250 FOR IP): Performed by: INTERNAL MEDICINE

## 2024-10-10 PROCEDURE — 6370000000 HC RX 637 (ALT 250 FOR IP)

## 2024-10-10 PROCEDURE — 6370000000 HC RX 637 (ALT 250 FOR IP): Performed by: STUDENT IN AN ORGANIZED HEALTH CARE EDUCATION/TRAINING PROGRAM

## 2024-10-10 PROCEDURE — 99239 HOSP IP/OBS DSCHRG MGMT >30: CPT | Performed by: INTERNAL MEDICINE

## 2024-10-10 PROCEDURE — 82330 ASSAY OF CALCIUM: CPT

## 2024-10-10 PROCEDURE — 6360000002 HC RX W HCPCS: Performed by: FAMILY MEDICINE

## 2024-10-10 PROCEDURE — 36415 COLL VENOUS BLD VENIPUNCTURE: CPT

## 2024-10-10 PROCEDURE — 6370000000 HC RX 637 (ALT 250 FOR IP): Performed by: NURSE PRACTITIONER

## 2024-10-10 PROCEDURE — 2580000003 HC RX 258: Performed by: SURGERY

## 2024-10-10 PROCEDURE — 85025 COMPLETE CBC W/AUTO DIFF WBC: CPT

## 2024-10-10 PROCEDURE — 83735 ASSAY OF MAGNESIUM: CPT

## 2024-10-10 PROCEDURE — 99232 SBSQ HOSP IP/OBS MODERATE 35: CPT | Performed by: INTERNAL MEDICINE

## 2024-10-10 PROCEDURE — 6370000000 HC RX 637 (ALT 250 FOR IP): Performed by: FAMILY MEDICINE

## 2024-10-10 PROCEDURE — 80048 BASIC METABOLIC PNL TOTAL CA: CPT

## 2024-10-10 RX ORDER — OXYCODONE HYDROCHLORIDE 5 MG/1
5 TABLET ORAL EVERY 6 HOURS PRN
Qty: 28 TABLET | Refills: 0 | Status: SHIPPED | OUTPATIENT
Start: 2024-10-10 | End: 2024-10-17

## 2024-10-10 RX ADMIN — SODIUM CHLORIDE, PRESERVATIVE FREE 10 ML: 5 INJECTION INTRAVENOUS at 09:23

## 2024-10-10 RX ADMIN — FLUCONAZOLE 400 MG: 100 TABLET ORAL at 09:19

## 2024-10-10 RX ADMIN — GABAPENTIN 300 MG: 300 CAPSULE ORAL at 14:51

## 2024-10-10 RX ADMIN — PANTOPRAZOLE SODIUM 40 MG: 40 TABLET, DELAYED RELEASE ORAL at 06:20

## 2024-10-10 RX ADMIN — ACETAMINOPHEN 1000 MG: 500 TABLET ORAL at 14:50

## 2024-10-10 RX ADMIN — OXYCODONE HYDROCHLORIDE 10 MG: 5 TABLET ORAL at 11:00

## 2024-10-10 RX ADMIN — GABAPENTIN 300 MG: 300 CAPSULE ORAL at 06:20

## 2024-10-10 RX ADMIN — CYCLOBENZAPRINE 10 MG: 10 TABLET, FILM COATED ORAL at 14:51

## 2024-10-10 RX ADMIN — IBUPROFEN 400 MG: 400 TABLET, FILM COATED ORAL at 00:28

## 2024-10-10 RX ADMIN — METRONIDAZOLE 500 MG: 500 TABLET ORAL at 06:20

## 2024-10-10 RX ADMIN — METRONIDAZOLE 500 MG: 500 TABLET ORAL at 14:50

## 2024-10-10 RX ADMIN — LORAZEPAM 1 MG: 2 INJECTION INTRAMUSCULAR; INTRAVENOUS at 00:28

## 2024-10-10 RX ADMIN — CYCLOBENZAPRINE 10 MG: 10 TABLET, FILM COATED ORAL at 09:19

## 2024-10-10 RX ADMIN — IBUPROFEN 400 MG: 400 TABLET, FILM COATED ORAL at 09:19

## 2024-10-10 RX ADMIN — ACETAMINOPHEN 1000 MG: 500 TABLET ORAL at 06:20

## 2024-10-10 RX ADMIN — OXYCODONE HYDROCHLORIDE 10 MG: 5 TABLET ORAL at 06:20

## 2024-10-10 RX ADMIN — ENOXAPARIN SODIUM 40 MG: 100 INJECTION SUBCUTANEOUS at 09:19

## 2024-10-10 RX ADMIN — IBUPROFEN 400 MG: 400 TABLET, FILM COATED ORAL at 14:51

## 2024-10-10 RX ADMIN — TRAZODONE HYDROCHLORIDE 100 MG: 100 TABLET ORAL at 00:28

## 2024-10-10 RX ADMIN — OXYCODONE HYDROCHLORIDE 10 MG: 5 TABLET ORAL at 14:50

## 2024-10-10 RX ADMIN — LISINOPRIL 40 MG: 40 TABLET ORAL at 09:19

## 2024-10-10 ASSESSMENT — PAIN SCALES - GENERAL
PAINLEVEL_OUTOF10: 7
PAINLEVEL_OUTOF10: 6
PAINLEVEL_OUTOF10: 4
PAINLEVEL_OUTOF10: 4
PAINLEVEL_OUTOF10: 7
PAINLEVEL_OUTOF10: 7

## 2024-10-10 ASSESSMENT — PAIN DESCRIPTION - LOCATION
LOCATION: ABDOMEN

## 2024-10-10 ASSESSMENT — PAIN DESCRIPTION - DESCRIPTORS
DESCRIPTORS: SHARP;SHOOTING
DESCRIPTORS: ACHING;SORE;SHARP;SHOOTING
DESCRIPTORS: SHARP;STABBING
DESCRIPTORS: SHARP;SHOOTING;SORE;ACHING

## 2024-10-10 ASSESSMENT — PAIN - FUNCTIONAL ASSESSMENT
PAIN_FUNCTIONAL_ASSESSMENT: ACTIVITIES ARE NOT PREVENTED
PAIN_FUNCTIONAL_ASSESSMENT: ACTIVITIES ARE NOT PREVENTED
PAIN_FUNCTIONAL_ASSESSMENT: PREVENTS OR INTERFERES SOME ACTIVE ACTIVITIES AND ADLS

## 2024-10-10 ASSESSMENT — ENCOUNTER SYMPTOMS
ABDOMINAL PAIN: 1
RESPIRATORY NEGATIVE: 1

## 2024-10-10 ASSESSMENT — PAIN DESCRIPTION - ORIENTATION
ORIENTATION: MID
ORIENTATION: MID
ORIENTATION: LEFT;MID
ORIENTATION: LEFT;LOWER;MID

## 2024-10-10 NOTE — PROGRESS NOTES
Review:    N new imaging to review      ASSESSMENT:  Active Hospital Problems    Diagnosis Date Noted    Intra-abdominal abscess (HCC) [K65.1] 10/07/2024    Perforation of sigmoid colon due to diverticulitis [K57.20] 10/04/2024    Intra-abdominal free air of unknown etiology [K66.8] 10/01/2024    Benign essential hypertension [I10] 07/17/2024    Alcohol abuse [F10.10] 05/23/2024    Ankylosing spondylitis (HCC) [M45.9] 05/27/2016    Arthritis, rheumatoid (HCC) [M06.9] 2014       39 y.o. male with perforated diverticulitis POD 8 s/p Stacy's procedure      Plan:  Continue low fiber diet  Pain control - continue multimodal pain therapy  Continue abx per ID - switch to PO for discharge anticipation  Continue GALDINO drain to bulb suction  OOB and ambulate  Smoking education   Drain education   Anticipate d/c home today, ok for discharge from our perspective    Electronically signed by Surya Russell DO  on 10/10/2024 at 7:34 AM   Attending Physician Statement  I have discussed the case, including pertinent history and exam findings with the resident. I agree with the assessment, plan and orders as documented by the resident.     OK to go home today with f/u in 1 week  GALDINO drain care

## 2024-10-10 NOTE — PROGRESS NOTES
[facility]  Progress Note    Abdifatah De Anda  MRN: [id number]  Date: 10/10/2024  LOS: 9     Reason for F/U :   Perforated diverticulitis    Impression :   Acute complicated/perforated diverticulitis diverticulitis   status post Holder's procedure with end colostomy creation 10/02/2024  Postoperative abdominal pain with leukocytosis   Follow-up CT without any abscess  Rheumatoid arthritis/ankylosing spondylitis  Essential hypertension  History of substance abuse    Recommendations:   The patient continues on intravenous antimicrobial therapy with Rocephin, metronidazole, and fluconazole  The plan is for discharge today and the patient has been switched to oral antimicrobial therapy by the surgical team  The plan will be for discharge on oral antimicrobial therapy    Infection Control Recommendations:   Universal precautions    Discharge Planning:   Estimated Length of IV antimicrobials: While hospitalized  Patient will need Midline Catheter Insertion/ PICC line Insertion: No  Patient will need: Home IV , Infusion Center,  SNF,  LTAC: Undetermined  Patient willneed outpatient wound care: No    Medical Decision making / Summary of Stay:   Abdifatah De Anda is a 39 y.o.-year-old male who was initially admitted on 10/1/2024.   Abdifatah has a rheumatoid arthritis, ankylosing spondylolysis, hypertension, anxiety disorder, tobacco abuse, EtOH abuse who has been having abdominal pain for 1 to 2 weeks and [facility] scan showing sigmoid diverticulitis without any complications and the patient reports that after being hospitalized for a day or 2 for observation the patient was discharged on oral antimicrobial therapy with ciprofloxacin and metronidazole though he still reported significant pain at that time.  The patient ended up coming to the emergency room here at [facility] 9/27/2024 and a CT scan done at that point in time showed significant  formation and the patient had a CT scan of the abdomen pelvis ordered today and I was asked to evaluate and help with antibiotic choice.    Current evaluation:10/10/2024    BP (!) 150/99   Pulse 96   Temp 98.1 °F (36.7 °C) (Oral)   Resp 16   Wt 71.2 kg (156 lb 14.4 oz)   SpO2 96%   BMI 26.11 kg/m²     Temperature Range: Temp: 98.1 °F (36.7 °C) Temp  Av.6 °F (37 °C)  Min: 98.1 °F (36.7 °C)  Max: 99.7 °F (37.6 °C)  The patient is seen and evaluated at bedside and is awake and alert in no acute distress  No subjective fever or chills  The patient has minimal abdominal pain and has no nausea vomiting or diarrhea.  No chest pains or palpitations.    Review of Systems   Constitutional: Negative.    HENT: Negative.     Respiratory: Negative.     Cardiovascular: Negative.    Gastrointestinal:  Positive for abdominal pain.   Genitourinary: Negative.    Musculoskeletal: Negative.    Neurological: Negative.    Psychiatric/Behavioral: Negative.         Physical Examination :     Physical Exam  Constitutional:       Appearance: He is well-developed.   HENT:      Head: Normocephalic and atraumatic.   Cardiovascular:      Rate and Rhythm: Normal rate.      Heart sounds: Normal heart sounds.      No friction rub. No gallop.   Pulmonary:      Effort: Pulmonary effort is normal.      Breath sounds: Normal breath sounds. No wheezing.   Abdominal:      General: Bowel sounds are normal.      Palpations: Abdomen is soft. There is no mass.      Tenderness: There is no abdominal tenderness.      Comments: There is a midline incision with a dressing in place  There is a ostomy in place on the left side of the abdomen  There is a GALDINO drain in place   Musculoskeletal:         General: Normal range of motion.      Cervical back: Normal range of motion and neck supple.   Lymphadenopathy:      Cervical: No cervical adenopathy.   Skin:     General: Skin is warm and dry.   Neurological:      Mental Status: He is alert and oriented to

## 2024-10-10 NOTE — PROGRESS NOTES
CLINICAL PHARMACY NOTE: MEDS TO BEDS    Total # of Prescriptions Filled: 7   The following medications were delivered to the patient:  Oxycodone 5mg  Stool softener 100mg  Fluconazole 100mg  Metronidazole 500mg  Lisinopril 40mg  Ciprofloxacin 500mg  Ondansetron 4mg    Additional Documentation:

## 2024-10-10 NOTE — PROGRESS NOTES
Discharge teaching and instructions completed with patient using teachback method. AVS reviewed. Printed prescriptions given to patient. Patient voiced understanding regarding prescriptions, follow up appointments, and care of self at home. Discharged home via [employer]. All questions answered.

## 2024-10-10 NOTE — PROGRESS NOTES
[facility]  Office: [phone]  Fady Flores, DO, Chris Medina, DO, Nick Yarbrough DO, Gary Wei, DO, Aileen Lloyd MD, Marie Astorga MD, Jacob Kelly MD, Zaira Estrella MD,  German Ramos MD, Vi Castillo MD, Sigifredo Schwartz MD,  Denice Streeter DO, Sebastián Suggs MD, Tye Boateng MD, Stan Flores DO, Nida Wilhelm MD,  Damir Herrera DO, Mallory Brasher MD, Rosana Escalante MD, Conchis Jiménez MD, Salazar Trinh MD,  Santos Rai MD, Jonna Delgado MD, Raymundo Ambrocio MD, Silver Fisher MD, Angel Dhaliwal MD, Michael Lewis MD, Maxwell Sifuentes, DO, Camron Salas DO, Dillon Brizuela DO, Delvin Leija MD, Shirley Waterhouse, CNP,  Kasie Early CNP, Maxwell Lugo, CNP,  Desiree Hannah, DNP, Stacie Moss, CNP, Rosangela Kennedy, CNP, Lauren Franco, CNP, Layla Fritz, CNP, Leandra Vega, PA-C, Ivory Cool, PA-C, Taina Cunningham, CNP, Denys Pond, CNP,  Anusha Romero, CNP, Twila Montgomery, CNP, Gretchen Rudolph, CNP, Maria De Jesus Sebastian, CNS, Michelle Thapa, CNP, Dulce Sky, CNP, Tracy Schwab, CNP         [facility]   IN-PATIENT SERVICE   [facility]    Progress Note    10/10/2024    9:34 AM    Name:   Abdifatah De Anda  MRN:     [id number]     Acct:      [id number]   Room:   1004/1004-02  IP Day:  9  Admit Date:  10/1/2024  9:14 PM    PCP:   Brennon Reyes DO  Code Status:  Full Code    Subjective:     C/C:   Chief Complaint   Patient presents with    Abdominal Pain     Pt was d/c from facility at 1300 today, returned back to facility 1500 as he began to feel worse again with his symptoms. Patient reports he is unable to eat. Denies having a BM. Denies urination complications.      Interval History Status: .   Denies abdominal pain today  Tolerating low fiber diet  Having liquid bowel movements in colostomy bag  Colostomy bag/dressing changed by ostomy nurse   Still Has GALDINO drain in right lower quadrant  Goes out to smoke  Per surgery he can go home with GALDINO

## 2024-10-10 NOTE — PLAN OF CARE
no change overnight  Pt reports improvement of overall condition/pain control  Pain managed with PRN sarah, see MAR  PRN ativan used for anxiety, prn trazodone for sleep, effective pt slept half of the night  Dressing intact, no drainage  Good output through ostomy, loose brown. Pt doing care, edu provided  Pt ambulatory throughout night to smoke  Plan for discharge today after medications called into or pharmacy  All questions answered, no needs at this time, wcm    Patient having pain on their abdomen and rates it a 7. Pain interventions includefrequent position changes, correct body alignment/body mechanics, relaxation techniques, medication, pillow support/positioning, regular rest periods, medicate per physician recommendation/order, medicate at the onset of pain before it interferes with regular functions , and medicate before exercise/activity. Patients goal for pain relief is 3. The need for pain and symptom management will be considered in the discharge planning process to ensure patients comfort.    Problem: Discharge Planning  Goal: Discharge to home or other facility with appropriate resources  10/10/2024 0205 by Rehan Hemphill RN  Outcome: Progressing  Flowsheets (Taken 10/9/2024 2000)  Discharge to home or other facility with appropriate resources:   Identify barriers to discharge with patient and caregiver   Arrange for needed discharge resources and transportation as appropriate   Identify discharge learning needs (meds, wound care, etc)   Refer to discharge planning if patient needs post-hospital services based on physician order or complex needs related to functional status, cognitive ability or social support system     Problem: Safety - Adult  Goal: Free from fall injury  10/10/2024 0205 by Rehan Hemphill, RN  Outcome: Progressing     Problem: Pain  Goal: Verbalizes/displays adequate comfort level or baseline comfort level  10/10/2024 0205 by Rehan Hemphill, RN  Outcome: Progressing    ostomies   Nutrition consult   Infuse IV Fluids/TPN as ordered   Administer IV fluids and TPN as ordered     Problem: ABCDS Injury Assessment  Goal: Absence of physical injury  10/10/2024 0205 by Rehan Hemphill RN  Outcome: Progressing     Problem: Nutrition Deficit:  Goal: Optimize nutritional status  10/10/2024 0205 by Rehan Hemphill RN  Outcome: Progressing     Problem: Skin/Tissue Integrity - Adult  Goal: Incisions, wounds, or drain sites healing without S/S of infection  10/10/2024 0205 by Rehan Hemphill RN  Outcome: Progressing     Problem: Anxiety  Goal: Will report anxiety at manageable levels  Description: INTERVENTIONS:  1. Administer medication as ordered  2. Teach and rehearse alternative coping skills  3. Provide emotional support with 1:1 interaction with staff  10/10/2024 0205 by Rehan Hemphill RN  Outcome: Progressing  Flowsheets (Taken 10/9/2024 2000)  Will report anxiety at manageable levels:   Administer medication as ordered   Teach and rehearse alternative coping skills   Provide emotional support with 1:1 interaction with staff     Problem: Drug Abuse/Detox  Goal: Will have no detox symptoms and will verbalize plan for changing drug-related behavior  Description: INTERVENTIONS:  1. Administer medication as ordered  2. Monitor physical status  3. Provide emotional support with 1:1 interaction with staff  4. Encourage  recovery focused treatment   10/10/2024 0205 by Rehan Hemphill RN  Outcome: Progressing  Flowsheets (Taken 10/9/2024 2000)  Will have no detox symptoms and will verbalize plan for changing drug-related behavior:   Administer medication as ordered   Monitor physical status   Provide emotional support with 1:1 interaction with staff   Encourage recovery focused treatment     Problem: Infection - Adult  Goal: Absence of infection at discharge  10/10/2024 0205 by Rehan Hemphill RN  Outcome: Progressing  Flowsheets (Taken 10/9/2024 2000)  Absence of infection at

## 2024-10-14 NOTE — H&P
..  [facility]  Office: [phone]  Fady Flores DO, Chris Medina, DO, Nick Yarbrough DO, Gary Wei DO, Aileen Lloyd MD, Marie Astorga MD, Jacob Kelly MD, Zaira Estrella MD,  German Ramos MD, Vi Castillo MD, Sigifredo Schwartz MD,  Denice Streeter DO, Sebastián Suggs MD, Tye Boateng MD, Stan Flores DO, Nida Wilhelm MD,  Damir Herrera DO, Mallory Brasher MD, Rosana Escalante MD, Conchis Jiménez MD, Salazar Trinh MD,  Santos Rai MD, Jonna Delgado MD, Raymundo Ambrocio MD, Silver Fisher MD, Angel Dhaliwal MD, Michael Lewis MD, Maxwell Sifuentes DO, Camron Salas DO, Dillon Brizuela DO, Delvin Leija MD, Shirley Waterhouse, CNP,  Kasie Early, CNP, Maxwell Lugo, CNP,  Desiree Hannah, DNP, Stacie Moss, CNP, Rosangela Kennedy, CNP, Lauren Franco, CNP, Layla Fritz, CNP, Leandra Vega, PA-C, Ivory Cool PA-C, Taina Cunningham, CNP, Denys Pond, CNP,  Anusha Romero, CNP, Twila Montgomery, CNP, Gretchen Rudolph, CNP, Maria De Jesus Sebastian, CNS, Michelle Thapa, CNP, Dulce Sky, CNP, Tracy Schwab, CNP         [facility]   IN-PATIENT SERVICE   [facility]    HISTORY AND PHYSICAL EXAMINATION            Date:   10/1/2024  Patient name:  Abdifatah De Anda  Date of admission:  10/1/2024  9:14 PM  MRN:   [id number]  Account:  [id number]  YOB: 1985  PCP:    Brennon Reyes DO  Room:   [place] [address]  Code Status:    Prior    Chief Complaint:     Chief Complaint   Patient presents with    Abdominal Pain     Pt was d/c from facility at 1300 today, returned back to facility 1500 as he began to feel worse again with his symptoms. Patient reports he is unable to eat. Denies having a BM. Denies urination complications.        History Obtained From:     patient, electronic medical record    History of Present Illness:     Abdifatah De Anda is a 39 y.o. Non- / non  male who presents with Abdominal Pain (Pt was d/c from facility at 1300 today,  169.9 Automated 0.0 0.0 per 100 WBC    Neutrophils % 70 (H) 36 - 65 %    Lymphocytes % 15 (L) 24 - 43 %    Monocytes % 12 3 - 12 %    Eosinophils % 2 1 - 4 %    Basophils % 1 0 - 2 %    Immature Granulocytes % 1 (H) 0 %    Neutrophils Absolute 5.63 1.50 - 8.10 k/uL    Lymphocytes Absolute 1.19 1.10 - 3.70 k/uL    Monocytes Absolute 0.98 0.10 - 1.20 k/uL    Eosinophils Absolute 0.14 0.00 - 0.44 k/uL    Basophils Absolute 0.04 0.00 - 0.20 k/uL    Immature Granulocytes Absolute 0.08 0.00 - 0.30 k/uL   Comprehensive Metabolic Panel    Collection Time: 10/01/24  4:55 AM   Result Value Ref Range    Sodium 138 135 - 144 mmol/L    Potassium 3.2 (L) 3.7 - 5.3 mmol/L    Chloride 101 98 - 107 mmol/L    CO2 24 20 - 31 mmol/L    Anion Gap 13 9 - 17 mmol/L    Glucose 94 70 - 99 mg/dL    BUN 2 (L) 6 - 20 mg/dL    Creatinine 0.5 (L) 0.7 - 1.2 mg/dL    Est, Glom Filt Rate >90 >60 mL/min/1.73m2    BUN/Creatinine Ratio 4 (L) 9 - 20    Calcium 8.3 (L) 8.6 - 10.4 mg/dL    Total Protein 5.5 (L) 6.4 - 8.3 g/dL    Albumin 3.0 (L) 3.5 - 5.2 g/dL    Total Bilirubin 0.3 0.3 - 1.2 mg/dL    Alkaline Phosphatase 69 40 - 129 U/L    ALT 6 5 - 41 U/L    AST 11 <40 U/L   Basic Metabolic Panel    Collection Time: 10/01/24 10:03 PM   Result Value Ref Range    Sodium 138 135 - 144 mmol/L    Potassium 3.2 (L) 3.7 - 5.3 mmol/L    Chloride 101 98 - 107 mmol/L    CO2 23 20 - 31 mmol/L    Anion Gap 14 9 - 17 mmol/L    Glucose 119 (H) 70 - 99 mg/dL    BUN <2 (L) 6 - 20 mg/dL    Creatinine 0.4 (L) 0.7 - 1.2 mg/dL    Est, Glom Filt Rate >90 >60 mL/min/1.73m2    BUN/Creatinine Ratio Can not be calculated 9 - 20    Calcium 8.4 (L) 8.6 - 10.4 mg/dL   CBC with Auto Differential    Collection Time: 10/01/24 10:03 PM   Result Value Ref Range    WBC 14.2 (H) 3.5 - 11.3 k/uL    RBC 4.40 4.21 - 5.77 m/uL    Hemoglobin 14.1 13.0 - 17.0 g/dL    Hematocrit 39.8 (L) 40.7 - 50.3 %    MCV 90.5 82.6 - 102.9 fL    MCH 32.0 25.2 - 33.5 pg    MCHC 35.4 (H) 28.4 - 34.8 g/dL    RDW 12.4

## 2025-04-22 ENCOUNTER — APPOINTMENT (OUTPATIENT)
Dept: CT IMAGING | Age: 40
End: 2025-04-22
Payer: COMMERCIAL

## 2025-04-22 ENCOUNTER — HOSPITAL ENCOUNTER (EMERGENCY)
Age: 40
Discharge: HOME | End: 2025-04-22
Attending: EMERGENCY MEDICINE
Payer: COMMERCIAL

## 2025-04-22 VITALS
RESPIRATION RATE: 18 BRPM | BODY MASS INDEX: 24.99 KG/M2 | TEMPERATURE: 99 F | OXYGEN SATURATION: 100 % | DIASTOLIC BLOOD PRESSURE: 108 MMHG | HEIGHT: 65 IN | WEIGHT: 150 LBS | HEART RATE: 108 BPM | SYSTOLIC BLOOD PRESSURE: 134 MMHG

## 2025-04-22 DIAGNOSIS — R14.0 ABDOMINAL BLOATING: Primary | ICD-10-CM

## 2025-04-22 LAB
ALBUMIN SERPL-MCNC: 4.7 G/DL (ref 3.5–5.2)
ALBUMIN/GLOB SERPL: 1.7 {RATIO} (ref 1–2.5)
ALP SERPL-CCNC: 100 U/L (ref 40–129)
ALT SERPL-CCNC: 14 U/L (ref 10–50)
ANION GAP SERPL CALCULATED.3IONS-SCNC: 14 MMOL/L (ref 9–16)
AST SERPL-CCNC: 22 U/L (ref 10–50)
BACTERIA URNS QL MICRO: ABNORMAL
BASOPHILS # BLD: 0.07 K/UL (ref 0–0.2)
BASOPHILS NFR BLD: 1 % (ref 0–2)
BILIRUB SERPL-MCNC: 0.9 MG/DL (ref 0–1.2)
BILIRUB UR QL STRIP: NEGATIVE
BUN SERPL-MCNC: 12 MG/DL (ref 6–20)
CALCIUM SERPL-MCNC: 9.9 MG/DL (ref 8.6–10.4)
CHLORIDE SERPL-SCNC: 97 MMOL/L (ref 98–107)
CLARITY UR: CLEAR
CO2 SERPL-SCNC: 25 MMOL/L (ref 20–31)
COLOR UR: YELLOW
CREAT SERPL-MCNC: 0.6 MG/DL (ref 0.7–1.2)
EOSINOPHIL # BLD: 0.1 K/UL (ref 0–0.44)
EOSINOPHILS RELATIVE PERCENT: 2 % (ref 1–4)
EPI CELLS #/AREA URNS HPF: ABNORMAL /HPF (ref 0–5)
ERYTHROCYTE [DISTWIDTH] IN BLOOD BY AUTOMATED COUNT: 13.3 % (ref 11.8–14.4)
GFR, ESTIMATED: >90 ML/MIN/1.73M2
GLUCOSE SERPL-MCNC: 93 MG/DL (ref 74–99)
GLUCOSE UR STRIP-MCNC: NEGATIVE MG/DL
HCT VFR BLD AUTO: 45.7 % (ref 40.7–50.3)
HGB BLD-MCNC: 16 G/DL (ref 13–17)
HGB UR QL STRIP.AUTO: NEGATIVE
IMM GRANULOCYTES # BLD AUTO: 0.02 K/UL (ref 0–0.3)
IMM GRANULOCYTES NFR BLD: 0 %
KETONES UR STRIP-MCNC: ABNORMAL MG/DL
LEUKOCYTE ESTERASE UR QL STRIP: NEGATIVE
LIPASE SERPL-CCNC: 47 U/L (ref 13–60)
LYMPHOCYTES NFR BLD: 1.35 K/UL (ref 1.1–3.7)
LYMPHOCYTES RELATIVE PERCENT: 21 % (ref 24–43)
MCH RBC QN AUTO: 32.5 PG (ref 25.2–33.5)
MCHC RBC AUTO-ENTMCNC: 35 G/DL (ref 28.4–34.8)
MCV RBC AUTO: 92.9 FL (ref 82.6–102.9)
MONOCYTES NFR BLD: 0.99 K/UL (ref 0.1–1.2)
MONOCYTES NFR BLD: 15 % (ref 3–12)
NEUTROPHILS NFR BLD: 61 % (ref 36–65)
NEUTS SEG NFR BLD: 3.91 K/UL (ref 1.5–8.1)
NITRITE UR QL STRIP: NEGATIVE
NRBC BLD-RTO: 0 PER 100 WBC
PH UR STRIP: 7 [PH] (ref 5–8)
PLATELET # BLD AUTO: 190 K/UL (ref 138–453)
PMV BLD AUTO: 10.2 FL (ref 8.1–13.5)
POTASSIUM SERPL-SCNC: 4 MMOL/L (ref 3.7–5.3)
PROT SERPL-MCNC: 7.5 G/DL (ref 6.6–8.7)
PROT UR STRIP-MCNC: NEGATIVE MG/DL
RBC # BLD AUTO: 4.92 M/UL (ref 4.21–5.77)
RBC #/AREA URNS HPF: ABNORMAL /HPF (ref 0–2)
SODIUM SERPL-SCNC: 135 MMOL/L (ref 136–145)
SP GR UR STRIP: 1.07 (ref 1–1.03)
UROBILINOGEN UR STRIP-ACNC: NORMAL EU/DL (ref 0–1)
WBC #/AREA URNS HPF: ABNORMAL /HPF (ref 0–5)
WBC OTHER # BLD: 6.4 K/UL (ref 3.5–11.3)

## 2025-04-22 PROCEDURE — 2580000003 HC RX 258: Performed by: NURSE PRACTITIONER

## 2025-04-22 PROCEDURE — 80053 COMPREHEN METABOLIC PANEL: CPT

## 2025-04-22 PROCEDURE — 81001 URINALYSIS AUTO W/SCOPE: CPT

## 2025-04-22 PROCEDURE — 85025 COMPLETE CBC W/AUTO DIFF WBC: CPT

## 2025-04-22 PROCEDURE — 99285 EMERGENCY DEPT VISIT HI MDM: CPT

## 2025-04-22 PROCEDURE — 36415 COLL VENOUS BLD VENIPUNCTURE: CPT

## 2025-04-22 PROCEDURE — 83690 ASSAY OF LIPASE: CPT

## 2025-04-22 PROCEDURE — 74177 CT ABD & PELVIS W/CONTRAST: CPT

## 2025-04-22 PROCEDURE — 2500000003 HC RX 250 WO HCPCS: Performed by: NURSE PRACTITIONER

## 2025-04-22 PROCEDURE — 6360000004 HC RX CONTRAST MEDICATION: Performed by: NURSE PRACTITIONER

## 2025-04-22 RX ORDER — SODIUM CHLORIDE 0.9 % (FLUSH) 0.9 %
10 SYRINGE (ML) INJECTION ONCE
Status: COMPLETED | OUTPATIENT
Start: 2025-04-22 | End: 2025-04-22

## 2025-04-22 RX ORDER — 0.9 % SODIUM CHLORIDE 0.9 %
80 INTRAVENOUS SOLUTION INTRAVENOUS ONCE
Status: COMPLETED | OUTPATIENT
Start: 2025-04-22 | End: 2025-04-22

## 2025-04-22 RX ORDER — 0.9 % SODIUM CHLORIDE 0.9 %
1000 INTRAVENOUS SOLUTION INTRAVENOUS ONCE
Status: COMPLETED | OUTPATIENT
Start: 2025-04-22 | End: 2025-04-22

## 2025-04-22 RX ORDER — IOPAMIDOL 755 MG/ML
75 INJECTION, SOLUTION INTRAVASCULAR
Status: COMPLETED | OUTPATIENT
Start: 2025-04-22 | End: 2025-04-22

## 2025-04-22 RX ADMIN — IOPAMIDOL 75 ML: 755 INJECTION, SOLUTION INTRAVENOUS at 20:07

## 2025-04-22 RX ADMIN — SODIUM CHLORIDE, PRESERVATIVE FREE 10 ML: 5 INJECTION INTRAVENOUS at 20:08

## 2025-04-22 RX ADMIN — SODIUM CHLORIDE 80 ML: 9 INJECTION, SOLUTION INTRAVENOUS at 20:08

## 2025-04-22 RX ADMIN — SODIUM CHLORIDE 1000 ML: 9 INJECTION, SOLUTION INTRAVENOUS at 19:28

## 2025-04-22 ASSESSMENT — PAIN SCALES - GENERAL: PAINLEVEL_OUTOF10: 0

## 2025-04-22 ASSESSMENT — PAIN - FUNCTIONAL ASSESSMENT: PAIN_FUNCTIONAL_ASSESSMENT: 0-10

## 2025-04-22 NOTE — ED PROVIDER NOTES
eMERGENCY dEPARTMENT  Attending Physician Attestation     Pt Name: Abdifatah De Anda  MRN: [id number]  Birthdate 1985  Date of evaluation: 4/22/25     Abdifatah De Anda is a 39 y.o. male with CC: Abdominal Pain (Has a colostomy, due for reversal in May, increasing pressure for the pasts two days. )      Based on the medical record the care appears appropriate.  I was personally available for consultation in the Emergency Department.    Brian Sutton DO  Attending Emergency Physician                  Brian Sutton DO  04/22/25 3120

## 2025-04-22 NOTE — ED PROVIDER NOTES
[facility] EMERGENCY DEPARTMENT  eMERGENCY dEPARTMENT eNCOUnter      Pt Name: Abdifatah De Anda  MRN: [id number]  Birthdate 1985  Date of evaluation: 4/22/2025  Provider: JOAQUINA Corbin CNP    CHIEF COMPLAINT       Chief Complaint   Patient presents with    Abdominal Pain     Has a colostomy, due for reversal in May, increasing pressure for the pasts two days.          HISTORY OF PRESENT ILLNESS  (Location/Symptom, Timing/Onset, Context/Setting, Quality, Duration, Modifying Factors, Severity.)   Abdifatah De Anda is a 39 y.o. male who presents to the emergency department today for evaluation concerns for abdominal distention.  Patient endorses he has a history of diverticulitis with perforations that subsequent required colostomy to be placed 5 months ago.  He endorses he is post to have a reversal of his colostomy at the beginning of May and has an appointment with his surgeon in the next 2 days for this.  He mentions over the past 3 days he has had some increased bloating.  He does endorse normal stool output but states that has been a little bit less today but reports he has not eaten much.  He denies any sick symptoms of fever/chills, chest pain, shortness of breath, abdominal pain, nausea/vomiting.  No urinary symptoms or back/flank pain.    Nursing Notes were reviewed.    ALLERGIES     Adalimumab, Penicillin g, Penicillins, and Zolpidem    CURRENT MEDICATIONS       Previous Medications    IBUPROFEN (ADVIL;MOTRIN) 600 MG TABLET    Take 1 tablet by mouth 3 times daily as needed for Pain    LISINOPRIL (PRINIVIL;ZESTRIL) 40 MG TABLET    Take 1 tablet by mouth daily    OMEPRAZOLE (PRILOSEC) 20 MG DELAYED RELEASE CAPSULE    Take 1 capsule by mouth daily       PAST MEDICAL HISTORY         Diagnosis Date    Alcohol abuse 05/23/2024    Ankylosing spondylitis (HCC) 05/27/2016    Anxiety     Arthritis, rheumatoid (HCC) 2014    Benign essential hypertension 07/17/2024    Chronic iritis of both

## 2025-04-23 NOTE — DISCHARGE INSTRUCTIONS
Keep your appointment with your general surgeon for reevaluation and further discussion for revision of your ostomy.  May consider over-the-counter medication such as Gas-X or simethicone.  Avoid any spicy/fried foods and soda that may cause additional bloating or gas.

## 2025-04-23 NOTE — ED NOTES
Pt informed of need for urine sample, states \"I already went twice and no one told me, I don't need to go now\".

## 2025-04-24 ENCOUNTER — HOSPITAL ENCOUNTER (OUTPATIENT)
Dept: PREADMISSION TESTING | Age: 40
Discharge: HOME | End: 2025-04-28
Payer: COMMERCIAL

## 2025-04-24 VITALS
TEMPERATURE: 97.8 F | HEART RATE: 94 BPM | SYSTOLIC BLOOD PRESSURE: 141 MMHG | RESPIRATION RATE: 20 BRPM | BODY MASS INDEX: 24.76 KG/M2 | OXYGEN SATURATION: 97 % | WEIGHT: 148.6 LBS | HEIGHT: 65 IN | DIASTOLIC BLOOD PRESSURE: 76 MMHG

## 2025-04-24 DIAGNOSIS — Z01.818 PRE-OP TESTING: Primary | ICD-10-CM

## 2025-04-24 LAB
ABO + RH BLD: NORMAL
ARM BAND NUMBER: NORMAL
BLOOD BANK SAMPLE EXPIRATION: NORMAL
BLOOD GROUP ANTIBODIES SERPL: NEGATIVE

## 2025-04-24 PROCEDURE — 86900 BLOOD TYPING SEROLOGIC ABO: CPT

## 2025-04-24 PROCEDURE — 86850 RBC ANTIBODY SCREEN: CPT

## 2025-04-24 PROCEDURE — 36415 COLL VENOUS BLD VENIPUNCTURE: CPT

## 2025-04-24 PROCEDURE — 86901 BLOOD TYPING SEROLOGIC RH(D): CPT

## 2025-04-24 RX ORDER — METRONIDAZOLE 500 MG/100ML
500 INJECTION, SOLUTION INTRAVENOUS ONCE
OUTPATIENT
Start: 2025-05-08

## 2025-04-24 NOTE — PRE-PROCEDURE INSTRUCTIONS
On the Day of Your Surgery Thursday 5/8/2025 [facility]     Enter the hospital through the Main Entrance, take the lobby elevators to the second floor and check in at the Surgery Registration desk.     Continue to take your home medications as you normally do up to and including the night before surgery with the exception of blood thinning medications.    Blood Thinning Medications:  Please stop prescription blood thinning medications such as Apixaban (Eliquis); Clopidogrel (Plavix); Dabigatran (Pradaxa); Prasugrel (Effient); Rivaroxaban (Xarelto); Ticagrelor (Brilinta); Warfarin (Coumadin) only as directed by your surgeon and/or the prescribing physician    Some common examples of other medications that can thin your blood are: Aspirin, Ibuprofen (Advil, Motrin), Naproxen (Aleve), Meloxicam (Mobic), Celecoxib (Celebrex), Fish Oil, many Herbal Supplements.  These medications should usually be stopped at least 7 days prior to surgery.       Stop using OTC pain relievers containing Aspirin, Ibuprofen (Advil, Motrin) or Naproxen (Aleve) seven days prior to surgery.  It is OK to continue using Tylenol for pain the week prior to surgery.    Failure to stop certain medications may interfere with your scheduled surgery.    If you receive instructions from your surgeon regarding what medications to stop prior to surgery, please follow those specific instructions.        Please take the following medication(s) the day of surgery with small sips of water:             omeprazole      Showering Before Surgery:     You can play an important role in your own health by carefully washing before surgery. Shower the night before and the morning of surgery using the instructions below. If you are allergic to Chlorhexidine Gluconate (CHG) use antibacterial soap instead.    If you were given Chlorhexidine soap, please follow the instructions included with the soap to shower the night before and the morning of

## 2025-04-25 LAB
EKG ATRIAL RATE: 79 BPM
EKG P AXIS: 80 DEGREES
EKG P-R INTERVAL: 126 MS
EKG Q-T INTERVAL: 364 MS
EKG QRS DURATION: 100 MS
EKG QTC CALCULATION (BAZETT): 417 MS
EKG R AXIS: 53 DEGREES
EKG T AXIS: 69 DEGREES
EKG VENTRICULAR RATE: 79 BPM

## 2025-05-27 ENCOUNTER — ANESTHESIA EVENT (OUTPATIENT)
Dept: OPERATING ROOM | Age: 40
End: 2025-05-27
Payer: COMMERCIAL

## 2025-05-28 ENCOUNTER — ANESTHESIA (OUTPATIENT)
Dept: OPERATING ROOM | Age: 40
End: 2025-05-28
Payer: COMMERCIAL

## 2025-05-28 ENCOUNTER — HOSPITAL ENCOUNTER (INPATIENT)
Age: 40
LOS: 4 days | Discharge: HOME | DRG: 331 | End: 2025-06-01
Attending: SURGERY | Admitting: SURGERY
Payer: COMMERCIAL

## 2025-05-28 DIAGNOSIS — K57.90 DIVERTICULOSIS: ICD-10-CM

## 2025-05-28 DIAGNOSIS — G89.18 POST-OP PAIN: Primary | ICD-10-CM

## 2025-05-28 LAB
ABO + RH BLD: NORMAL
ARM BAND NUMBER: NORMAL
BLOOD BANK SAMPLE EXPIRATION: NORMAL
BLOOD GROUP ANTIBODIES SERPL: NEGATIVE
GLUCOSE BLD-MCNC: 102 MG/DL (ref 75–110)
GLUCOSE BLD-MCNC: 163 MG/DL (ref 75–110)

## 2025-05-28 PROCEDURE — 0DBN0ZZ EXCISION OF SIGMOID COLON, OPEN APPROACH: ICD-10-PCS | Performed by: SURGERY

## 2025-05-28 PROCEDURE — 6360000002 HC RX W HCPCS: Performed by: SURGERY

## 2025-05-28 PROCEDURE — 86901 BLOOD TYPING SEROLOGIC RH(D): CPT

## 2025-05-28 PROCEDURE — 7100000000 HC PACU RECOVERY - FIRST 15 MIN: Performed by: SURGERY

## 2025-05-28 PROCEDURE — 86900 BLOOD TYPING SEROLOGIC ABO: CPT

## 2025-05-28 PROCEDURE — 82947 ASSAY GLUCOSE BLOOD QUANT: CPT

## 2025-05-28 PROCEDURE — 88302 TISSUE EXAM BY PATHOLOGIST: CPT

## 2025-05-28 PROCEDURE — 2580000003 HC RX 258: Performed by: ANESTHESIOLOGY

## 2025-05-28 PROCEDURE — 0DN80ZZ RELEASE SMALL INTESTINE, OPEN APPROACH: ICD-10-PCS | Performed by: SURGERY

## 2025-05-28 PROCEDURE — 0DBM0ZZ EXCISION OF DESCENDING COLON, OPEN APPROACH: ICD-10-PCS | Performed by: SURGERY

## 2025-05-28 PROCEDURE — 3600000002 HC SURGERY LEVEL 2 BASE: Performed by: SURGERY

## 2025-05-28 PROCEDURE — 6370000000 HC RX 637 (ALT 250 FOR IP): Performed by: SURGERY

## 2025-05-28 PROCEDURE — 2720000010 HC SURG SUPPLY STERILE: Performed by: SURGERY

## 2025-05-28 PROCEDURE — 2709999900 HC NON-CHARGEABLE SUPPLY: Performed by: SURGERY

## 2025-05-28 PROCEDURE — 2500000003 HC RX 250 WO HCPCS: Performed by: SURGERY

## 2025-05-28 PROCEDURE — 88304 TISSUE EXAM BY PATHOLOGIST: CPT

## 2025-05-28 PROCEDURE — 3600000012 HC SURGERY LEVEL 2 ADDTL 15MIN: Performed by: SURGERY

## 2025-05-28 PROCEDURE — 2580000003 HC RX 258: Performed by: SURGERY

## 2025-05-28 PROCEDURE — 0DTJ0ZZ RESECTION OF APPENDIX, OPEN APPROACH: ICD-10-PCS | Performed by: SURGERY

## 2025-05-28 PROCEDURE — 0WQF0ZZ REPAIR ABDOMINAL WALL, OPEN APPROACH: ICD-10-PCS | Performed by: SURGERY

## 2025-05-28 PROCEDURE — 3700000000 HC ANESTHESIA ATTENDED CARE: Performed by: SURGERY

## 2025-05-28 PROCEDURE — 7100000001 HC PACU RECOVERY - ADDTL 15 MIN: Performed by: SURGERY

## 2025-05-28 PROCEDURE — 2500000003 HC RX 250 WO HCPCS: Performed by: NURSE ANESTHETIST, CERTIFIED REGISTERED

## 2025-05-28 PROCEDURE — 6360000002 HC RX W HCPCS: Performed by: NURSE ANESTHETIST, CERTIFIED REGISTERED

## 2025-05-28 PROCEDURE — 6360000002 HC RX W HCPCS: Performed by: ANESTHESIOLOGY

## 2025-05-28 PROCEDURE — 0DNW0ZZ RELEASE PERITONEUM, OPEN APPROACH: ICD-10-PCS | Performed by: SURGERY

## 2025-05-28 PROCEDURE — 86850 RBC ANTIBODY SCREEN: CPT

## 2025-05-28 PROCEDURE — 3700000001 HC ADD 15 MINUTES (ANESTHESIA): Performed by: SURGERY

## 2025-05-28 PROCEDURE — 1200000000 HC SEMI PRIVATE

## 2025-05-28 PROCEDURE — 6370000000 HC RX 637 (ALT 250 FOR IP): Performed by: ANESTHESIOLOGY

## 2025-05-28 RX ORDER — SODIUM CHLORIDE 0.9 % (FLUSH) 0.9 %
5-40 SYRINGE (ML) INJECTION EVERY 12 HOURS SCHEDULED
Status: DISCONTINUED | OUTPATIENT
Start: 2025-05-28 | End: 2025-05-28 | Stop reason: HOSPADM

## 2025-05-28 RX ORDER — THIAMINE HYDROCHLORIDE 100 MG/ML
100 INJECTION, SOLUTION INTRAMUSCULAR; INTRAVENOUS DAILY
Status: DISCONTINUED | OUTPATIENT
Start: 2025-05-28 | End: 2025-06-01

## 2025-05-28 RX ORDER — SODIUM CHLORIDE 9 MG/ML
INJECTION, SOLUTION INTRAVENOUS PRN
Status: DISCONTINUED | OUTPATIENT
Start: 2025-05-28 | End: 2025-05-28 | Stop reason: HOSPADM

## 2025-05-28 RX ORDER — LORAZEPAM 1 MG/1
4 TABLET ORAL
Status: DISCONTINUED | OUTPATIENT
Start: 2025-05-28 | End: 2025-06-01 | Stop reason: HOSPADM

## 2025-05-28 RX ORDER — MORPHINE SULFATE 4 MG/ML
4 INJECTION, SOLUTION INTRAMUSCULAR; INTRAVENOUS
Status: DISCONTINUED | OUTPATIENT
Start: 2025-05-28 | End: 2025-06-01 | Stop reason: HOSPADM

## 2025-05-28 RX ORDER — ENOXAPARIN SODIUM 100 MG/ML
40 INJECTION SUBCUTANEOUS DAILY
Status: DISCONTINUED | OUTPATIENT
Start: 2025-05-29 | End: 2025-06-01 | Stop reason: HOSPADM

## 2025-05-28 RX ORDER — LORAZEPAM 2 MG/ML
2 INJECTION INTRAMUSCULAR
Status: DISCONTINUED | OUTPATIENT
Start: 2025-05-28 | End: 2025-06-01 | Stop reason: HOSPADM

## 2025-05-28 RX ORDER — ROCURONIUM BROMIDE 10 MG/ML
INJECTION, SOLUTION INTRAVENOUS
Status: DISCONTINUED | OUTPATIENT
Start: 2025-05-28 | End: 2025-05-28 | Stop reason: SDUPTHER

## 2025-05-28 RX ORDER — DEXTROSE MONOHYDRATE, SODIUM CHLORIDE, AND POTASSIUM CHLORIDE 50; 1.49; 4.5 G/1000ML; G/1000ML; G/1000ML
INJECTION, SOLUTION INTRAVENOUS CONTINUOUS
Status: DISCONTINUED | OUTPATIENT
Start: 2025-05-28 | End: 2025-06-01 | Stop reason: HOSPADM

## 2025-05-28 RX ORDER — NICOTINE 21 MG/24HR
1 PATCH, TRANSDERMAL 24 HOURS TRANSDERMAL DAILY
Status: DISCONTINUED | OUTPATIENT
Start: 2025-05-28 | End: 2025-06-01 | Stop reason: HOSPADM

## 2025-05-28 RX ORDER — LIDOCAINE HYDROCHLORIDE 20 MG/ML
INJECTION, SOLUTION EPIDURAL; INFILTRATION; INTRACAUDAL; PERINEURAL
Status: DISCONTINUED | OUTPATIENT
Start: 2025-05-28 | End: 2025-05-28 | Stop reason: SDUPTHER

## 2025-05-28 RX ORDER — HYDROMORPHONE HYDROCHLORIDE 2 MG/ML
INJECTION, SOLUTION INTRAMUSCULAR; INTRAVENOUS; SUBCUTANEOUS
Status: DISCONTINUED | OUTPATIENT
Start: 2025-05-28 | End: 2025-05-28 | Stop reason: SDUPTHER

## 2025-05-28 RX ORDER — KETOROLAC TROMETHAMINE 30 MG/ML
30 INJECTION, SOLUTION INTRAMUSCULAR; INTRAVENOUS EVERY 6 HOURS
Status: DISCONTINUED | OUTPATIENT
Start: 2025-05-28 | End: 2025-06-01 | Stop reason: HOSPADM

## 2025-05-28 RX ORDER — IPRATROPIUM BROMIDE AND ALBUTEROL SULFATE 2.5; .5 MG/3ML; MG/3ML
1 SOLUTION RESPIRATORY (INHALATION) ONCE
Status: COMPLETED | OUTPATIENT
Start: 2025-05-28 | End: 2025-05-28

## 2025-05-28 RX ORDER — LORAZEPAM 1 MG/1
1 TABLET ORAL
Status: DISCONTINUED | OUTPATIENT
Start: 2025-05-28 | End: 2025-06-01 | Stop reason: HOSPADM

## 2025-05-28 RX ORDER — SODIUM CHLORIDE, SODIUM LACTATE, POTASSIUM CHLORIDE, CALCIUM CHLORIDE 600; 310; 30; 20 MG/100ML; MG/100ML; MG/100ML; MG/100ML
INJECTION, SOLUTION INTRAVENOUS CONTINUOUS
Status: DISCONTINUED | OUTPATIENT
Start: 2025-05-28 | End: 2025-05-28 | Stop reason: HOSPADM

## 2025-05-28 RX ORDER — SODIUM CHLORIDE 9 MG/ML
INJECTION, SOLUTION INTRAVENOUS PRN
Status: DISCONTINUED | OUTPATIENT
Start: 2025-05-28 | End: 2025-06-01 | Stop reason: HOSPADM

## 2025-05-28 RX ORDER — FENTANYL CITRATE 50 UG/ML
25 INJECTION, SOLUTION INTRAMUSCULAR; INTRAVENOUS EVERY 5 MIN PRN
Status: DISCONTINUED | OUTPATIENT
Start: 2025-05-28 | End: 2025-05-28 | Stop reason: HOSPADM

## 2025-05-28 RX ORDER — FAMOTIDINE 20 MG/1
20 TABLET, FILM COATED ORAL 2 TIMES DAILY
Status: DISCONTINUED | OUTPATIENT
Start: 2025-05-28 | End: 2025-06-01 | Stop reason: HOSPADM

## 2025-05-28 RX ORDER — SODIUM CHLORIDE 0.9 % (FLUSH) 0.9 %
5-40 SYRINGE (ML) INJECTION PRN
Status: DISCONTINUED | OUTPATIENT
Start: 2025-05-28 | End: 2025-05-28 | Stop reason: HOSPADM

## 2025-05-28 RX ORDER — PROPOFOL 10 MG/ML
INJECTION, EMULSION INTRAVENOUS
Status: DISCONTINUED | OUTPATIENT
Start: 2025-05-28 | End: 2025-05-28 | Stop reason: SDUPTHER

## 2025-05-28 RX ORDER — ONDANSETRON 2 MG/ML
INJECTION INTRAMUSCULAR; INTRAVENOUS
Status: DISCONTINUED | OUTPATIENT
Start: 2025-05-28 | End: 2025-05-28 | Stop reason: SDUPTHER

## 2025-05-28 RX ORDER — POTASSIUM CHLORIDE 7.45 MG/ML
10 INJECTION INTRAVENOUS PRN
Status: DISCONTINUED | OUTPATIENT
Start: 2025-05-28 | End: 2025-06-01 | Stop reason: HOSPADM

## 2025-05-28 RX ORDER — LORAZEPAM 2 MG/ML
3 INJECTION INTRAMUSCULAR
Status: DISCONTINUED | OUTPATIENT
Start: 2025-05-28 | End: 2025-06-01 | Stop reason: HOSPADM

## 2025-05-28 RX ORDER — OXYCODONE HYDROCHLORIDE 5 MG/1
5 TABLET ORAL EVERY 4 HOURS PRN
Status: DISCONTINUED | OUTPATIENT
Start: 2025-05-28 | End: 2025-06-01 | Stop reason: HOSPADM

## 2025-05-28 RX ORDER — LORAZEPAM 1 MG/1
2 TABLET ORAL
Status: DISCONTINUED | OUTPATIENT
Start: 2025-05-28 | End: 2025-06-01 | Stop reason: HOSPADM

## 2025-05-28 RX ORDER — OXYCODONE HYDROCHLORIDE 5 MG/1
5 TABLET ORAL
Status: DISCONTINUED | OUTPATIENT
Start: 2025-05-28 | End: 2025-05-28 | Stop reason: HOSPADM

## 2025-05-28 RX ORDER — DEXAMETHASONE SODIUM PHOSPHATE 10 MG/ML
INJECTION, SOLUTION INTRAMUSCULAR; INTRAVENOUS
Status: DISCONTINUED | OUTPATIENT
Start: 2025-05-28 | End: 2025-05-28 | Stop reason: SDUPTHER

## 2025-05-28 RX ORDER — METRONIDAZOLE 500 MG/100ML
500 INJECTION, SOLUTION INTRAVENOUS ONCE
Status: COMPLETED | OUTPATIENT
Start: 2025-05-28 | End: 2025-05-28

## 2025-05-28 RX ORDER — LORAZEPAM 2 MG/ML
4 INJECTION INTRAMUSCULAR
Status: DISCONTINUED | OUTPATIENT
Start: 2025-05-28 | End: 2025-06-01 | Stop reason: HOSPADM

## 2025-05-28 RX ORDER — BUPIVACAINE HYDROCHLORIDE AND EPINEPHRINE 5; 5 MG/ML; UG/ML
INJECTION, SOLUTION EPIDURAL; INTRACAUDAL; PERINEURAL PRN
Status: DISCONTINUED | OUTPATIENT
Start: 2025-05-28 | End: 2025-05-28 | Stop reason: ALTCHOICE

## 2025-05-28 RX ORDER — LORAZEPAM 1 MG/1
3 TABLET ORAL
Status: DISCONTINUED | OUTPATIENT
Start: 2025-05-28 | End: 2025-06-01 | Stop reason: HOSPADM

## 2025-05-28 RX ORDER — PHENYLEPHRINE HCL IN 0.9% NACL 1 MG/10 ML
SYRINGE (ML) INTRAVENOUS
Status: DISCONTINUED | OUTPATIENT
Start: 2025-05-28 | End: 2025-05-28 | Stop reason: SDUPTHER

## 2025-05-28 RX ORDER — MORPHINE SULFATE 2 MG/ML
2 INJECTION, SOLUTION INTRAMUSCULAR; INTRAVENOUS
Status: DISCONTINUED | OUTPATIENT
Start: 2025-05-28 | End: 2025-06-01 | Stop reason: HOSPADM

## 2025-05-28 RX ORDER — TRAMADOL HYDROCHLORIDE 50 MG/1
100 TABLET ORAL ONCE
Status: COMPLETED | OUTPATIENT
Start: 2025-05-28 | End: 2025-05-28

## 2025-05-28 RX ORDER — SODIUM CHLORIDE 0.9 % (FLUSH) 0.9 %
5-40 SYRINGE (ML) INJECTION EVERY 12 HOURS SCHEDULED
Status: DISCONTINUED | OUTPATIENT
Start: 2025-05-28 | End: 2025-06-01 | Stop reason: HOSPADM

## 2025-05-28 RX ORDER — MIDAZOLAM HYDROCHLORIDE 1 MG/ML
INJECTION, SOLUTION INTRAMUSCULAR; INTRAVENOUS
Status: DISCONTINUED | OUTPATIENT
Start: 2025-05-28 | End: 2025-05-28 | Stop reason: SDUPTHER

## 2025-05-28 RX ORDER — PROMETHAZINE HYDROCHLORIDE 12.5 MG/1
12.5 TABLET ORAL ONCE
Status: COMPLETED | OUTPATIENT
Start: 2025-05-28 | End: 2025-05-28

## 2025-05-28 RX ORDER — SODIUM CHLORIDE 0.9 % (FLUSH) 0.9 %
5-40 SYRINGE (ML) INJECTION PRN
Status: DISCONTINUED | OUTPATIENT
Start: 2025-05-28 | End: 2025-06-01 | Stop reason: HOSPADM

## 2025-05-28 RX ORDER — OXYCODONE HYDROCHLORIDE 5 MG/1
10 TABLET ORAL EVERY 4 HOURS PRN
Status: DISCONTINUED | OUTPATIENT
Start: 2025-05-28 | End: 2025-06-01 | Stop reason: HOSPADM

## 2025-05-28 RX ORDER — METOCLOPRAMIDE HYDROCHLORIDE 5 MG/ML
10 INJECTION INTRAMUSCULAR; INTRAVENOUS
Status: DISCONTINUED | OUTPATIENT
Start: 2025-05-28 | End: 2025-05-28 | Stop reason: HOSPADM

## 2025-05-28 RX ORDER — KETAMINE HCL IN 0.9 % NACL 50 MG/5 ML
SYRINGE (ML) INTRAVENOUS
Status: DISCONTINUED | OUTPATIENT
Start: 2025-05-28 | End: 2025-05-28 | Stop reason: SDUPTHER

## 2025-05-28 RX ORDER — NALOXONE HYDROCHLORIDE 0.4 MG/ML
INJECTION, SOLUTION INTRAMUSCULAR; INTRAVENOUS; SUBCUTANEOUS PRN
Status: DISCONTINUED | OUTPATIENT
Start: 2025-05-28 | End: 2025-05-28 | Stop reason: HOSPADM

## 2025-05-28 RX ORDER — ONDANSETRON 2 MG/ML
4 INJECTION INTRAMUSCULAR; INTRAVENOUS EVERY 6 HOURS PRN
Status: DISCONTINUED | OUTPATIENT
Start: 2025-05-28 | End: 2025-06-01 | Stop reason: HOSPADM

## 2025-05-28 RX ORDER — SODIUM CHLORIDE 9 MG/ML
INJECTION, SOLUTION INTRAVENOUS CONTINUOUS
Status: DISCONTINUED | OUTPATIENT
Start: 2025-05-28 | End: 2025-05-28 | Stop reason: HOSPADM

## 2025-05-28 RX ORDER — LORAZEPAM 2 MG/ML
1 INJECTION INTRAMUSCULAR
Status: DISCONTINUED | OUTPATIENT
Start: 2025-05-28 | End: 2025-06-01 | Stop reason: HOSPADM

## 2025-05-28 RX ORDER — LIDOCAINE HYDROCHLORIDE 10 MG/ML
1 INJECTION, SOLUTION EPIDURAL; INFILTRATION; INTRACAUDAL; PERINEURAL
Status: DISCONTINUED | OUTPATIENT
Start: 2025-05-28 | End: 2025-05-28 | Stop reason: HOSPADM

## 2025-05-28 RX ORDER — POTASSIUM CHLORIDE 1500 MG/1
40 TABLET, EXTENDED RELEASE ORAL PRN
Status: DISCONTINUED | OUTPATIENT
Start: 2025-05-28 | End: 2025-06-01 | Stop reason: HOSPADM

## 2025-05-28 RX ORDER — HYDROMORPHONE HYDROCHLORIDE 1 MG/ML
0.5 INJECTION, SOLUTION INTRAMUSCULAR; INTRAVENOUS; SUBCUTANEOUS EVERY 5 MIN PRN
Status: DISCONTINUED | OUTPATIENT
Start: 2025-05-28 | End: 2025-05-28 | Stop reason: HOSPADM

## 2025-05-28 RX ORDER — KETOROLAC TROMETHAMINE 30 MG/ML
INJECTION, SOLUTION INTRAMUSCULAR; INTRAVENOUS
Status: DISCONTINUED | OUTPATIENT
Start: 2025-05-28 | End: 2025-05-28 | Stop reason: SDUPTHER

## 2025-05-28 RX ORDER — ONDANSETRON 2 MG/ML
4 INJECTION INTRAMUSCULAR; INTRAVENOUS
Status: DISCONTINUED | OUTPATIENT
Start: 2025-05-28 | End: 2025-05-28 | Stop reason: HOSPADM

## 2025-05-28 RX ORDER — ACETAMINOPHEN 325 MG/1
650 TABLET ORAL EVERY 6 HOURS
Status: DISCONTINUED | OUTPATIENT
Start: 2025-05-28 | End: 2025-06-01 | Stop reason: HOSPADM

## 2025-05-28 RX ORDER — FENTANYL CITRATE 50 UG/ML
INJECTION, SOLUTION INTRAMUSCULAR; INTRAVENOUS
Status: DISCONTINUED | OUTPATIENT
Start: 2025-05-28 | End: 2025-05-28 | Stop reason: SDUPTHER

## 2025-05-28 RX ADMIN — ACETAMINOPHEN 650 MG: 325 TABLET ORAL at 21:29

## 2025-05-28 RX ADMIN — GENTAMICIN SULFATE 330 MG: 40 INJECTION, SOLUTION INTRAMUSCULAR; INTRAVENOUS at 08:55

## 2025-05-28 RX ADMIN — Medication 100 MCG: at 10:31

## 2025-05-28 RX ADMIN — OXYCODONE HYDROCHLORIDE 10 MG: 5 TABLET ORAL at 18:55

## 2025-05-28 RX ADMIN — HYDROMORPHONE HYDROCHLORIDE 0.5 MG: 2 INJECTION, SOLUTION INTRAMUSCULAR; INTRAVENOUS; SUBCUTANEOUS at 12:05

## 2025-05-28 RX ADMIN — KETOROLAC TROMETHAMINE 30 MG: 30 INJECTION, SOLUTION INTRAMUSCULAR at 12:14

## 2025-05-28 RX ADMIN — HYDROMORPHONE HYDROCHLORIDE 0.5 MG: 2 INJECTION, SOLUTION INTRAMUSCULAR; INTRAVENOUS; SUBCUTANEOUS at 12:14

## 2025-05-28 RX ADMIN — PROPOFOL 200 MG: 10 INJECTION, EMULSION INTRAVENOUS at 09:47

## 2025-05-28 RX ADMIN — FAMOTIDINE 20 MG: 20 TABLET, FILM COATED ORAL at 21:29

## 2025-05-28 RX ADMIN — MIDAZOLAM 2 MG: 1 INJECTION INTRAMUSCULAR; INTRAVENOUS at 09:35

## 2025-05-28 RX ADMIN — KETOROLAC TROMETHAMINE 30 MG: 30 INJECTION, SOLUTION INTRAMUSCULAR at 21:30

## 2025-05-28 RX ADMIN — ACETAMINOPHEN 650 MG: 325 TABLET ORAL at 16:40

## 2025-05-28 RX ADMIN — Medication 30 MG: at 09:47

## 2025-05-28 RX ADMIN — Medication 20 MG: at 10:59

## 2025-05-28 RX ADMIN — TRAMADOL HYDROCHLORIDE 100 MG: 50 TABLET, COATED ORAL at 08:45

## 2025-05-28 RX ADMIN — FENTANYL CITRATE 50 MCG: 50 INJECTION, SOLUTION INTRAMUSCULAR; INTRAVENOUS at 11:26

## 2025-05-28 RX ADMIN — FENTANYL CITRATE 50 MCG: 50 INJECTION, SOLUTION INTRAMUSCULAR; INTRAVENOUS at 11:24

## 2025-05-28 RX ADMIN — Medication 100 MCG: at 10:07

## 2025-05-28 RX ADMIN — IPRATROPIUM BROMIDE AND ALBUTEROL SULFATE 1 DOSE: 2.5; .5 SOLUTION RESPIRATORY (INHALATION) at 08:50

## 2025-05-28 RX ADMIN — OXYCODONE HYDROCHLORIDE 10 MG: 5 TABLET ORAL at 23:08

## 2025-05-28 RX ADMIN — KETOROLAC TROMETHAMINE 30 MG: 30 INJECTION, SOLUTION INTRAMUSCULAR at 16:39

## 2025-05-28 RX ADMIN — FENTANYL CITRATE 100 MCG: 50 INJECTION, SOLUTION INTRAMUSCULAR; INTRAVENOUS at 09:47

## 2025-05-28 RX ADMIN — LORAZEPAM 2 MG: 2 INJECTION INTRAMUSCULAR; INTRAVENOUS at 19:56

## 2025-05-28 RX ADMIN — HYDROMORPHONE HYDROCHLORIDE 0.5 MG: 1 INJECTION, SOLUTION INTRAMUSCULAR; INTRAVENOUS; SUBCUTANEOUS at 13:24

## 2025-05-28 RX ADMIN — ONDANSETRON 4 MG: 2 INJECTION INTRAMUSCULAR; INTRAVENOUS at 12:03

## 2025-05-28 RX ADMIN — Medication 100 MCG: at 10:39

## 2025-05-28 RX ADMIN — PROPOFOL 30 MG: 10 INJECTION, EMULSION INTRAVENOUS at 12:17

## 2025-05-28 RX ADMIN — POTASSIUM CHLORIDE, DEXTROSE MONOHYDRATE AND SODIUM CHLORIDE: 150; 5; 450 INJECTION, SOLUTION INTRAVENOUS at 16:36

## 2025-05-28 RX ADMIN — HYDROMORPHONE HYDROCHLORIDE 0.5 MG: 2 INJECTION, SOLUTION INTRAMUSCULAR; INTRAVENOUS; SUBCUTANEOUS at 12:02

## 2025-05-28 RX ADMIN — HYDROMORPHONE HYDROCHLORIDE 0.5 MG: 1 INJECTION, SOLUTION INTRAMUSCULAR; INTRAVENOUS; SUBCUTANEOUS at 14:35

## 2025-05-28 RX ADMIN — LIDOCAINE HYDROCHLORIDE 60 MG: 20 INJECTION, SOLUTION EPIDURAL; INFILTRATION; INTRACAUDAL; PERINEURAL at 09:47

## 2025-05-28 RX ADMIN — PROPOFOL 100 MG: 10 INJECTION, EMULSION INTRAVENOUS at 09:48

## 2025-05-28 RX ADMIN — HYDROMORPHONE HYDROCHLORIDE 0.5 MG: 2 INJECTION, SOLUTION INTRAMUSCULAR; INTRAVENOUS; SUBCUTANEOUS at 12:21

## 2025-05-28 RX ADMIN — FENTANYL CITRATE 50 MCG: 50 INJECTION, SOLUTION INTRAMUSCULAR; INTRAVENOUS at 09:52

## 2025-05-28 RX ADMIN — HYDROMORPHONE HYDROCHLORIDE 0.5 MG: 2 INJECTION, SOLUTION INTRAMUSCULAR; INTRAVENOUS; SUBCUTANEOUS at 12:32

## 2025-05-28 RX ADMIN — SODIUM CHLORIDE, POTASSIUM CHLORIDE, SODIUM LACTATE AND CALCIUM CHLORIDE: 600; 310; 30; 20 INJECTION, SOLUTION INTRAVENOUS at 10:53

## 2025-05-28 RX ADMIN — ROCURONIUM BROMIDE 50 MG: 10 INJECTION, SOLUTION INTRAVENOUS at 09:47

## 2025-05-28 RX ADMIN — Medication 100 MCG: at 10:33

## 2025-05-28 RX ADMIN — HYDROMORPHONE HYDROCHLORIDE 0.5 MG: 2 INJECTION, SOLUTION INTRAMUSCULAR; INTRAVENOUS; SUBCUTANEOUS at 12:11

## 2025-05-28 RX ADMIN — SUGAMMADEX 200 MG: 100 INJECTION, SOLUTION INTRAVENOUS at 12:42

## 2025-05-28 RX ADMIN — MIDAZOLAM 2 MG: 1 INJECTION INTRAMUSCULAR; INTRAVENOUS at 09:38

## 2025-05-28 RX ADMIN — PROPOFOL 50 MG: 10 INJECTION, EMULSION INTRAVENOUS at 12:15

## 2025-05-28 RX ADMIN — PROMETHAZINE HYDROCHLORIDE 12.5 MG: 12.5 TABLET ORAL at 08:45

## 2025-05-28 RX ADMIN — PROPOFOL 20 MG: 10 INJECTION, EMULSION INTRAVENOUS at 12:19

## 2025-05-28 RX ADMIN — SODIUM CHLORIDE, PRESERVATIVE FREE 10 ML: 5 INJECTION INTRAVENOUS at 19:56

## 2025-05-28 RX ADMIN — SODIUM CHLORIDE, POTASSIUM CHLORIDE, SODIUM LACTATE AND CALCIUM CHLORIDE: 600; 310; 30; 20 INJECTION, SOLUTION INTRAVENOUS at 08:44

## 2025-05-28 RX ADMIN — DEXAMETHASONE SODIUM PHOSPHATE 10 MG: 10 INJECTION, SOLUTION INTRAMUSCULAR; INTRAVENOUS at 10:05

## 2025-05-28 RX ADMIN — SODIUM CHLORIDE, POTASSIUM CHLORIDE, SODIUM LACTATE AND CALCIUM CHLORIDE: 600; 310; 30; 20 INJECTION, SOLUTION INTRAVENOUS at 12:09

## 2025-05-28 RX ADMIN — MORPHINE SULFATE 4 MG: 4 INJECTION, SOLUTION INTRAMUSCULAR; INTRAVENOUS at 16:16

## 2025-05-28 RX ADMIN — METRONIDAZOLE 500 MG: 500 SOLUTION INTRAVENOUS at 09:54

## 2025-05-28 RX ADMIN — Medication 100 MCG: at 10:25

## 2025-05-28 RX ADMIN — PROPOFOL 100 MG: 10 INJECTION, EMULSION INTRAVENOUS at 11:59

## 2025-05-28 ASSESSMENT — PAIN SCALES - GENERAL
PAINLEVEL_OUTOF10: 9
PAINLEVEL_OUTOF10: 5
PAINLEVEL_OUTOF10: 9
PAINLEVEL_OUTOF10: 3
PAINLEVEL_OUTOF10: 8
PAINLEVEL_OUTOF10: 0
PAINLEVEL_OUTOF10: 5
PAINLEVEL_OUTOF10: 10
PAINLEVEL_OUTOF10: 6
PAINLEVEL_OUTOF10: 9
PAINLEVEL_OUTOF10: 8

## 2025-05-28 ASSESSMENT — PAIN DESCRIPTION - LOCATION
LOCATION: ABDOMEN

## 2025-05-28 ASSESSMENT — PAIN DESCRIPTION - ORIENTATION
ORIENTATION: MID;UPPER
ORIENTATION: MID;UPPER

## 2025-05-28 ASSESSMENT — PAIN DESCRIPTION - DESCRIPTORS
DESCRIPTORS: SHARP
DESCRIPTORS: ACHING

## 2025-05-28 ASSESSMENT — PAIN - FUNCTIONAL ASSESSMENT
PAIN_FUNCTIONAL_ASSESSMENT: 0-10
PAIN_FUNCTIONAL_ASSESSMENT: ACTIVITIES ARE NOT PREVENTED
PAIN_FUNCTIONAL_ASSESSMENT: ACTIVITIES ARE NOT PREVENTED

## 2025-05-28 NOTE — ANESTHESIA POSTPROCEDURE EVALUATION
Department of Anesthesiology  Postprocedure Note    Patient: Abdifatah De Anda  MRN: [id number]  YOB: 1985  Date of evaluation: 5/28/2025    Procedure Summary       Date: 05/28/25 Room / Location: [address]    Anesthesia Start: 0938 Anesthesia Stop: 1249    Procedure: Partial colectomy, reversal of colostomy, lysis of adhesion, repair incisional hernia , tap block, removal of eschar (Abdomen/Perineum) Diagnosis:       Diverticulosis      (Diverticulosis [K57.90])    Surgeons: Thien Patel MD Responsible Provider: Cathy Almanzar MD    Anesthesia Type: general ASA Status: 3            Anesthesia Type: No value filed.    Evelyn Phase I: Evelyn Score: 9    Evelyn Phase II:      Anesthesia Post Evaluation    Patient location during evaluation: PACU  Patient participation: complete - patient participated  Level of consciousness: awake  Airway patency: patent  Nausea & Vomiting: no nausea  Cardiovascular status: blood pressure returned to baseline  Respiratory status: acceptable  Hydration status: euvolemic  Comments: Multimodal analgesia pain management as indicated by procedure  Multimodal analgesia pain management approach  Pain management: adequate    No notable events documented.

## 2025-05-28 NOTE — ANESTHESIA PRE PROCEDURE
Department of Anesthesiology  Preprocedure Note       Name:  Abdifatah De Anda   Age:  40 y.o.  :  1985                                          MRN:  [id number]         Date:  2025      Surgeon: Surgeon(s):  Thien Patel MD    Procedure: Procedure(s):  REVERSAL OF COLOSTOMY    Medications prior to admission:   Prior to Admission medications    Medication Sig Start Date End Date Taking? Authorizing Provider   lisinopril (PRINIVIL;ZESTRIL) 40 MG tablet Take 1 tablet by mouth daily  Patient taking differently: Take 0.5 tablets by mouth daily 10/10/24   Aileen Lloyd MD   omeprazole (PRILOSEC) 20 MG delayed release capsule Take 1 capsule by mouth daily    Provider, MD Yumiko       Current medications:    Current Facility-Administered Medications   Medication Dose Route Frequency Provider Last Rate Last Admin   • lidocaine PF 1 % injection 1 mL  1 mL IntraDERmal Once PRN Cathy Almanzar MD       • 0.9 % sodium chloride infusion   IntraVENous Continuous Cathy Almanzar MD       • lactated ringers infusion   IntraVENous Continuous Cathy Almanzar MD       • sodium chloride flush 0.9 % injection 5-40 mL  5-40 mL IntraVENous 2 times per day Cathy Almanzar MD       • sodium chloride flush 0.9 % injection 5-40 mL  5-40 mL IntraVENous PRN Cathy Almanzar MD       • 0.9 % sodium chloride infusion   IntraVENous PRN Cathy Almanzar MD       • metroNIDAZOLE (FLAGYL) 500 mg in 0.9% NaCl 100 mL IVPB premix  500 mg IntraVENous Once Thien Patel MD       • gentamicin (GARAMYCIN) 330 mg in sodium chloride 0.9 % 100 mL IVPB  330 mg IntraVENous Once Thien Patel MD           Allergies:    Allergies   Allergen Reactions   • Adalimumab Other (See Comments)     Eye problems X2 (feels like there is something in the eyes)   • Penicillin G Rash   • Penicillins Hives   • Zolpidem      Altered mental status       Problem List:    Patient Active Problem List   Diagnosis Code   • Spondylolisthesis, acquired M43.10   • Ankylosing

## 2025-05-28 NOTE — OP NOTE
Operative Note      Patient: Abdifatah De Anda  YOB: 1985  MRN: [id number]    Date of Procedure: 5/28/2025    Pre-Op Diagnosis Codes:      * Diverticulosis [K57.90]    Post-Op Diagnosis:  History of perforated diverticulitis with a colostomy, extensive intra-abdominal adhesions noted.  Midline abdominal wall hernia and parastomal hernia noted.  history of perforated       Procedure(s):  Partial colectomy, reversal of colostomy, lysis of adhesion, repair incisional hernia , tap block, removal of eschar, appendectomy    Surgeon(s):  Rosangela Perez DO Bais, Sanjiv, MD    Assistant:   Surgical Assistant: Merlene Mosquera    Anesthesia: General    Estimated Blood Loss (mL): less than 100     Complications: None    Specimens:   ID Type Source Tests Collected by Time Destination   A : segments of descending colon and sigmoid colon Tissue Colon-Descending SURGICAL PATHOLOGY Thien Patel MD 5/28/2025 1155    B : appendix Tissue Appendix SURGICAL PATHOLOGY Thien Patel MD 5/28/2025 1156        Implants:  * No implants in log *      Drains:   Colostomy LLQ Descending/sigmoid (Active)       [REMOVED] NG/OG/NJ/NE Tube Orogastric 18 fr Center mouth (Removed)       [REMOVED] Urinary Catheter 05/28/25 [place] (Removed)       Findings:  Infection Present At Time Of Surgery (PATOS) (choose all levels that have infection present):  No infection present  Other Findings: As above    Detailed Description of Procedure:   Patient was brought to the operating room and was placed in the supine position.  After induction of general endotracheal anesthesia, patient was put in lithotomy position.  A 1010 drape was placed over the stoma site.  A midline incision was made starting from pubic symphysis to mid epigastrium.  The upper abdominal wall eschar was excised in a elliptical manner.  Examination revealed patient had abdomen wall hernia in the supraumbilical region extending to the subxiphoid area.  Carefully the abdominal cavity

## 2025-05-28 NOTE — H&P
History and Physical Service   [facility]    HISTORY AND PHYSICAL EXAMINATION            Date of Evaluation: 5/28/2025  Patient name:  Abdifatah De Anda  MRN:   [id number]  YOB: 1985  PCP:    Kelby Swain MD    History Obtained From:     Patient, medical records    History of Present Illness:     This is Abdifatah De Anda a 40 y.o. male who presents today for a REVERSAL OF COLOSTOMY by Thien Patel MD for Diverticulosis.   PATIENT HAD A PERFORATED BOWEL IN OCTOBER OF 2024.HE COMPLETED THE BOWEL PREP YESTERDAY.HE PRESENTS FOR COLOSTOMY REVERSAL.    Denies fever, chills, shortness of breath, cough, congestion, wheezing, chest pain, open sores or wounds.HE DOES NOT TAKE BLOOD THINNERS.HE DOES NOT HAVE DIABETES. HE DOES ADMIT TO DRINKING 6 ALCOHOLIC DRINKS PER DAY AND HAS BEEN TREATED FOR WITHDRAWAL IN THE PAST. HE ALSO SMOKES MARIJUANA.    Past Medical History:     Past Medical History:   Diagnosis Date    Alcohol abuse 05/23/2024    Ankylosing spondylitis ([facility]) 05/27/2016    Anxiety     Arthritis, rheumatoid ([facility]) 2014    no treatment    Benign essential hypertension 07/17/2024    on lisinopril    Chronic iritis of both eyes 05/18/2016    Last Assessment & Plan:   Formatting of this note might be different from the original.  Quiet today; Will follow closely; D/C PF1%  Formatting of this note might be different from the original.  Last Assessment & Plan:   Formatting of this note might be different from the original.  Quiet today; Will follow closely; D/C PF1%    Closed fracture of right distal fibula 12/21/2020    Foraminal stenosis of lumbar region 09/02/2020    Heartburn     Heroin abuse (H[facility])     Quit - none in years.    Sacroiliitis 12/19/2013    Spondylolisthesis, acquired 09/02/2020        Past Surgical History:     Past Surgical History:   Procedure Laterality Date    BACK SURGERY Bilateral 09/02/2020    L4-S1 TLIF BILATERAL DECOMPRESSION L5-S1 RIGHT DECOMPRESSION L4-5  palpation  Psych: normal affect     Investigations:      Laboratory Testing:  No results found for this or any previous visit (from the past 24 hours).    No results for input(s): \"HGB\", \"HCT\", \"WBC\", \"MCV\", \"PLATELET\", \"NA\", \"K\", \"CL\", \"CO2\", \"BUN\", \"CREATININE\", \"GLUCOSE\", \"INR\", \"PROTIME\", \"APTT\", \"AST\", \"ALT\", \"LABALBU\", \"HCG\" in the last 720 hours.    No results for input(s): \"COVID19\" in the last 720 hours.  Imaging/Diagnostics:    No results found.      Diagnosis:      Diverticulosis    Plans:     1. REVERSAL OF COLOSTOMY      JOAQUINA DONOHUE - CNP  5/28/2025  8:35 AM

## 2025-05-29 LAB
ANION GAP SERPL CALCULATED.3IONS-SCNC: 12 MMOL/L (ref 9–16)
BASOPHILS # BLD: <0.03 K/UL (ref 0–0.2)
BASOPHILS NFR BLD: 0 % (ref 0–2)
BUN SERPL-MCNC: 18 MG/DL (ref 6–20)
CALCIUM SERPL-MCNC: 8.6 MG/DL (ref 8.6–10.4)
CHLORIDE SERPL-SCNC: 98 MMOL/L (ref 98–107)
CO2 SERPL-SCNC: 22 MMOL/L (ref 20–31)
CREAT SERPL-MCNC: 0.9 MG/DL (ref 0.7–1.2)
EOSINOPHIL # BLD: <0.03 K/UL (ref 0–0.44)
EOSINOPHILS RELATIVE PERCENT: 0 % (ref 1–4)
ERYTHROCYTE [DISTWIDTH] IN BLOOD BY AUTOMATED COUNT: 13.2 % (ref 11.8–14.4)
GFR, ESTIMATED: >90 ML/MIN/1.73M2
GLUCOSE SERPL-MCNC: 103 MG/DL (ref 74–99)
HCT VFR BLD AUTO: 36.6 % (ref 40.7–50.3)
HGB BLD-MCNC: 13 G/DL (ref 13–17)
IMM GRANULOCYTES # BLD AUTO: 0.02 K/UL (ref 0–0.3)
IMM GRANULOCYTES NFR BLD: 0 %
LYMPHOCYTES NFR BLD: 0.91 K/UL (ref 1.1–3.7)
LYMPHOCYTES RELATIVE PERCENT: 10 % (ref 24–43)
MCH RBC QN AUTO: 33 PG (ref 25.2–33.5)
MCHC RBC AUTO-ENTMCNC: 35.5 G/DL (ref 28.4–34.8)
MCV RBC AUTO: 92.9 FL (ref 82.6–102.9)
MONOCYTES NFR BLD: 1.12 K/UL (ref 0.1–1.2)
MONOCYTES NFR BLD: 12 % (ref 3–12)
NEUTROPHILS NFR BLD: 78 % (ref 36–65)
NEUTS SEG NFR BLD: 7.13 K/UL (ref 1.5–8.1)
NRBC BLD-RTO: 0 PER 100 WBC
PLATELET # BLD AUTO: 188 K/UL (ref 138–453)
PMV BLD AUTO: 10.1 FL (ref 8.1–13.5)
POTASSIUM SERPL-SCNC: 4.4 MMOL/L (ref 3.7–5.3)
RBC # BLD AUTO: 3.94 M/UL (ref 4.21–5.77)
SODIUM SERPL-SCNC: 132 MMOL/L (ref 136–145)
WBC OTHER # BLD: 9.2 K/UL (ref 3.5–11.3)

## 2025-05-29 PROCEDURE — 36415 COLL VENOUS BLD VENIPUNCTURE: CPT

## 2025-05-29 PROCEDURE — 2500000003 HC RX 250 WO HCPCS: Performed by: SURGERY

## 2025-05-29 PROCEDURE — 80048 BASIC METABOLIC PNL TOTAL CA: CPT

## 2025-05-29 PROCEDURE — 94640 AIRWAY INHALATION TREATMENT: CPT

## 2025-05-29 PROCEDURE — 1200000000 HC SEMI PRIVATE

## 2025-05-29 PROCEDURE — 6360000002 HC RX W HCPCS: Performed by: SURGERY

## 2025-05-29 PROCEDURE — 2580000003 HC RX 258: Performed by: SURGERY

## 2025-05-29 PROCEDURE — 6370000000 HC RX 637 (ALT 250 FOR IP): Performed by: SURGERY

## 2025-05-29 PROCEDURE — 85025 COMPLETE CBC W/AUTO DIFF WBC: CPT

## 2025-05-29 PROCEDURE — 6370000000 HC RX 637 (ALT 250 FOR IP)

## 2025-05-29 PROCEDURE — 94761 N-INVAS EAR/PLS OXIMETRY MLT: CPT

## 2025-05-29 RX ORDER — IPRATROPIUM BROMIDE AND ALBUTEROL SULFATE 2.5; .5 MG/3ML; MG/3ML
1 SOLUTION RESPIRATORY (INHALATION) EVERY 4 HOURS PRN
Status: DISCONTINUED | OUTPATIENT
Start: 2025-05-29 | End: 2025-05-29

## 2025-05-29 RX ORDER — ALBUTEROL SULFATE 0.83 MG/ML
2.5 SOLUTION RESPIRATORY (INHALATION) EVERY 4 HOURS PRN
Status: DISCONTINUED | OUTPATIENT
Start: 2025-05-29 | End: 2025-06-01 | Stop reason: HOSPADM

## 2025-05-29 RX ORDER — ALBUTEROL SULFATE 90 UG/1
2 INHALANT RESPIRATORY (INHALATION) EVERY 4 HOURS PRN
Status: DISCONTINUED | OUTPATIENT
Start: 2025-05-29 | End: 2025-05-30

## 2025-05-29 RX ORDER — IPRATROPIUM BROMIDE AND ALBUTEROL SULFATE 2.5; .5 MG/3ML; MG/3ML
1 SOLUTION RESPIRATORY (INHALATION) 3 TIMES DAILY
Status: DISCONTINUED | OUTPATIENT
Start: 2025-05-29 | End: 2025-05-30

## 2025-05-29 RX ADMIN — OXYCODONE HYDROCHLORIDE 10 MG: 5 TABLET ORAL at 22:02

## 2025-05-29 RX ADMIN — ACETAMINOPHEN 650 MG: 325 TABLET ORAL at 16:20

## 2025-05-29 RX ADMIN — OXYCODONE HYDROCHLORIDE 10 MG: 5 TABLET ORAL at 17:55

## 2025-05-29 RX ADMIN — LORAZEPAM 1 MG: 2 INJECTION INTRAMUSCULAR; INTRAVENOUS at 11:00

## 2025-05-29 RX ADMIN — POTASSIUM CHLORIDE, DEXTROSE MONOHYDRATE AND SODIUM CHLORIDE: 150; 5; 450 INJECTION, SOLUTION INTRAVENOUS at 16:28

## 2025-05-29 RX ADMIN — IPRATROPIUM BROMIDE AND ALBUTEROL SULFATE 1 DOSE: .5; 2.5 SOLUTION RESPIRATORY (INHALATION) at 18:03

## 2025-05-29 RX ADMIN — LORAZEPAM 1 MG: 1 TABLET ORAL at 16:32

## 2025-05-29 RX ADMIN — OXYCODONE HYDROCHLORIDE 10 MG: 5 TABLET ORAL at 08:09

## 2025-05-29 RX ADMIN — ACETAMINOPHEN 650 MG: 325 TABLET ORAL at 20:08

## 2025-05-29 RX ADMIN — LORAZEPAM 2 MG: 2 INJECTION INTRAMUSCULAR; INTRAVENOUS at 00:10

## 2025-05-29 RX ADMIN — KETOROLAC TROMETHAMINE 30 MG: 30 INJECTION, SOLUTION INTRAMUSCULAR at 20:08

## 2025-05-29 RX ADMIN — OXYCODONE HYDROCHLORIDE 10 MG: 5 TABLET ORAL at 04:37

## 2025-05-29 RX ADMIN — OXYCODONE HYDROCHLORIDE 10 MG: 5 TABLET ORAL at 13:49

## 2025-05-29 RX ADMIN — LORAZEPAM 1 MG: 1 TABLET ORAL at 20:35

## 2025-05-29 RX ADMIN — SODIUM CHLORIDE, PRESERVATIVE FREE 10 ML: 5 INJECTION INTRAVENOUS at 19:20

## 2025-05-29 RX ADMIN — LORAZEPAM 1 MG: 1 TABLET ORAL at 19:20

## 2025-05-29 RX ADMIN — FAMOTIDINE 20 MG: 10 INJECTION, SOLUTION INTRAVENOUS at 19:20

## 2025-05-29 RX ADMIN — ACETAMINOPHEN 650 MG: 325 TABLET ORAL at 10:23

## 2025-05-29 RX ADMIN — LORAZEPAM 1 MG: 1 TABLET ORAL at 09:27

## 2025-05-29 RX ADMIN — KETOROLAC TROMETHAMINE 30 MG: 30 INJECTION, SOLUTION INTRAMUSCULAR at 16:19

## 2025-05-29 RX ADMIN — ACETAMINOPHEN 650 MG: 325 TABLET ORAL at 04:24

## 2025-05-29 RX ADMIN — ALBUTEROL SULFATE 2 PUFF: 90 AEROSOL, METERED RESPIRATORY (INHALATION) at 13:44

## 2025-05-29 RX ADMIN — KETOROLAC TROMETHAMINE 30 MG: 30 INJECTION, SOLUTION INTRAMUSCULAR at 09:27

## 2025-05-29 RX ADMIN — KETOROLAC TROMETHAMINE 30 MG: 30 INJECTION, SOLUTION INTRAMUSCULAR at 04:24

## 2025-05-29 RX ADMIN — THIAMINE HYDROCHLORIDE 100 MG: 100 INJECTION, SOLUTION INTRAMUSCULAR; INTRAVENOUS at 08:13

## 2025-05-29 RX ADMIN — FAMOTIDINE 20 MG: 20 TABLET, FILM COATED ORAL at 08:09

## 2025-05-29 RX ADMIN — ONDANSETRON 4 MG: 2 INJECTION, SOLUTION INTRAMUSCULAR; INTRAVENOUS at 08:13

## 2025-05-29 RX ADMIN — POTASSIUM CHLORIDE, DEXTROSE MONOHYDRATE AND SODIUM CHLORIDE: 150; 5; 450 INJECTION, SOLUTION INTRAVENOUS at 02:44

## 2025-05-29 ASSESSMENT — PAIN SCALES - GENERAL
PAINLEVEL_OUTOF10: 9
PAINLEVEL_OUTOF10: 8
PAINLEVEL_OUTOF10: 7
PAINLEVEL_OUTOF10: 5
PAINLEVEL_OUTOF10: 6
PAINLEVEL_OUTOF10: 8
PAINLEVEL_OUTOF10: 7
PAINLEVEL_OUTOF10: 8
PAINLEVEL_OUTOF10: 5
PAINLEVEL_OUTOF10: 6
PAINLEVEL_OUTOF10: 0
PAINLEVEL_OUTOF10: 8
PAINLEVEL_OUTOF10: 9

## 2025-05-29 ASSESSMENT — PAIN DESCRIPTION - FREQUENCY
FREQUENCY: CONTINUOUS

## 2025-05-29 ASSESSMENT — PAIN DESCRIPTION - LOCATION
LOCATION: ABDOMEN

## 2025-05-29 ASSESSMENT — PAIN DESCRIPTION - ONSET
ONSET: ON-GOING

## 2025-05-29 ASSESSMENT — PAIN - FUNCTIONAL ASSESSMENT
PAIN_FUNCTIONAL_ASSESSMENT: ACTIVITIES ARE NOT PREVENTED

## 2025-05-29 ASSESSMENT — PAIN DESCRIPTION - ORIENTATION
ORIENTATION: MID;UPPER
ORIENTATION: LOWER
ORIENTATION: UPPER
ORIENTATION: UPPER
ORIENTATION: MID;LOWER
ORIENTATION: UPPER

## 2025-05-29 ASSESSMENT — PAIN DESCRIPTION - DESCRIPTORS
DESCRIPTORS: ACHING;SHARP
DESCRIPTORS: ACHING
DESCRIPTORS: ACHING;DISCOMFORT;SORE
DESCRIPTORS: SORE
DESCRIPTORS: ACHING;DISCOMFORT;SORE
DESCRIPTORS: ACHING;DISCOMFORT;SORE

## 2025-05-29 ASSESSMENT — PAIN DESCRIPTION - PAIN TYPE
TYPE: SURGICAL PAIN

## 2025-05-29 NOTE — PROGRESS NOTES
General Surgery:  Daily Progress Note          PATIENT NAME: Abdifatah De Anda     TODAY'S DATE: 5/29/2025, 6:29 AM  CC: I am having some pain    SUBJECTIVE:     Pt seen and examined at bedside.  Patient is sitting up in bed.  He reports his pain is about an 8 out of 10 at this time.  Pain medications do not seem to be helping to reduce the pain at this time.  He is tolerating clear liquid diet.  Voiding appropriately.  No flatus or bowel movement at this time.  Abdominal dressing with minimal strikethrough. Patient did ambulate to go outside to smoke. Patient reports that during the week he drinks 4 tall boys and 4 shots after getting home. On the weekends, he will drink 40 beers daily.    OBJECTIVE:   VITALS:  BP (!) 140/100   Pulse 96   Temp 98.1 °F (36.7 °C) (Oral)   Resp 16   Ht 1.651 m (5' 5\")   Wt 67.4 kg (148 lb 9.4 oz)   SpO2 95%   BMI 24.73 kg/m²      INTAKE/OUTPUT:      Intake/Output Summary (Last 24 hours) at 5/29/2025 0629  Last data filed at 5/29/2025 0004  Gross per 24 hour   Intake 2764.53 ml   Output 425 ml   Net 2339.53 ml     PHYSICAL EXAM:  General Appearance: awake, alert, oriented, in no acute distress, well developed, well nourished, and in no acute distress  HEENT:  Normocephalic, atraumatic, mucus membranes moist   Heart: Heart regular rate and rhythm  Lungs: Normal expansion.  Clear to auscultation.  No rales, rhonchi, or wheezing.  Abdomen: Soft, mildly distended, appropriate tenderness to palpation. Dressing with minimal strikethrough.  Extremities: No cyanosis, pitting edema, rashes noted.    Skin: Skin color, texture, turgor normal. No rashes or lesions.    Data:  CBC with Differential:    Lab Results   Component Value Date/Time    WBC 9.2 05/29/2025 05:40 AM    RBC 3.94 05/29/2025 05:40 AM    HGB 13.0 05/29/2025 05:40 AM    HCT 36.6 05/29/2025 05:40 AM     05/29/2025 05:40 AM    MCV 92.9 05/29/2025 05:40 AM    MCH 33.0 05/29/2025 05:40 AM    MCHC 35.5 05/29/2025 05:40 AM

## 2025-05-29 NOTE — PLAN OF CARE
Problem: Discharge Planning  Goal: Discharge to home or other facility with appropriate resources  Outcome: Progressing     Problem: Pain  Goal: Verbalizes/displays adequate comfort level or baseline comfort level  Outcome: Progressing     Problem: Neurosensory - Adult  Goal: Achieves stable or improved neurological status  Reactivated     Problem: Skin/Tissue Integrity - Adult  Goal: Incisions, wounds, or drain sites healing without S/S of infection  Reactivated     Problem: Musculoskeletal - Adult  Goal: Return mobility to safest level of function  Reactivated  Goal: Maintain proper alignment of affected body part  Reactivated  Goal: Return ADL status to a safe level of function  Reactivated     Problem: Gastrointestinal - Adult  Goal: Minimal or absence of nausea and vomiting  Reactivated  Goal: Maintains or returns to baseline bowel function  Reactivated  Goal: Maintains adequate nutritional intake  Reactivated     Problem: Metabolic/Fluid and Electrolytes - Adult  Goal: Electrolytes maintained within normal limits  Reactivated

## 2025-05-29 NOTE — RT PROTOCOL NOTE
RT Nebulizer Bronchodilator Protocol Note    There is a bronchodilator order in the chart from a provider indicating to follow the RT Bronchodilator Protocol and there is an “Initiate RT Bronchodilator Protocol” order as well (see protocol at bottom of note).    CXR Findings:  No results found.    The findings from the last RT Protocol Assessment were as follows:  Smoking: Smoker 15 pack years or more  Respiratory Pattern: Dyspnea on exertion or RR 21-25 bpm  Breath Sounds: Inspiratory and expiratory or bilateral wheezing and/or rhonchi  Cough: Strong, spontaneous, non-productive  Indication for Bronchodilator Therapy: None  Bronchodilator Assessment Score: 9    Aerosolized bronchodilator medication orders have been revised according to the RT Nebulizer Bronchodilator Protocol below.    Respiratory Therapist to perform RT Therapy Protocol Assessment initially then follow the protocol.  Repeat RT Therapy Protocol Assessment PRN for score 0-3 or on second treatment, BID, and PRN for scores above 3.    No Indications - adjust the frequency to every 6 hours PRN wheezing or bronchospasm, if no treatments needed after 48 hours then discontinue using Per Protocol order mode.     If indication present, adjust the RT bronchodilator orders based on the Bronchodilator Assessment Score as indicated below.  If a patient is on this medication at home then do not decrease Frequency below that used at home.    0-3 - enter or revise RT bronchodilator order(s) to equivalent RT Bronchodilator order with Frequency of every 4 hours PRN for wheezing or increased work of breathing using Per Protocol order mode.       4-6 - enter or revise RT Bronchodilator order(s) to two equivalent RT bronchodilator orders with one order with BID Frequency and one order with Frequency of every 4 hours PRN wheezing or increased work of breathing using Per Protocol order mode.         7-10 - enter or revise RT Bronchodilator order(s) to two equivalent RT

## 2025-05-29 NOTE — PLAN OF CARE
Abdominal binder in place. Ambulating in halls and outside to smoke, paper signed and in chart. Pt educated. PO pain medication administered, effective. Denies passing flatus. On CIWA scale. On clears. Slight nausea, decreased appetite.   Problem: Discharge Planning  Goal: Discharge to home or other facility with appropriate resources  5/29/2025 0503 by Barbara Jim, RN  Outcome: Progressing  Flowsheets (Taken 5/28/2025 1901)  Discharge to home or other facility with appropriate resources:   Identify barriers to discharge with patient and caregiver   Arrange for needed discharge resources and transportation as appropriate   Identify discharge learning needs (meds, wound care, etc)   Refer to discharge planning if patient needs post-hospital services based on physician order or complex needs related to functional status, cognitive ability or social support system     Problem: Pain  Goal: Verbalizes/displays adequate comfort level or baseline comfort level  5/29/2025 0503 by Barbara Jim, RN  Outcome: Progressing  Flowsheets (Taken 5/29/2025 0503)  Verbalizes/displays adequate comfort level or baseline comfort level:   Encourage patient to monitor pain and request assistance   Assess pain using appropriate pain scale   Administer analgesics based on type and severity of pain and evaluate response     Problem: Neurosensory - Adult  Goal: Achieves stable or improved neurological status  5/29/2025 0503 by Barbara Jim, RN  Outcome: Progressing     Problem: Skin/Tissue Integrity - Adult  Goal: Incisions, wounds, or drain sites healing without S/S of infection  5/29/2025 0503 by Barbara Jim, RN  Outcome: Progressing

## 2025-05-29 NOTE — PROGRESS NOTES
Nutrition Note    Patient triggered for positive nutrition screen for home TF. Followed up with RN patient does not have PEG tube. Nutrition assessment not needed at this time. Patient will be followed based on length of stay, unless otherwise requested.    Electronically signed by MICHELLE SCOTT on 5/29/25 at 10:36 AM EDT    WILLIAM [id number]  [phone]

## 2025-05-29 NOTE — PLAN OF CARE
Problem: Respiratory - Adult  Goal: Achieves optimal ventilation and oxygenation  Outcome: Progressing  Goal: Clear lung sounds  Outcome: Progressing  Goal: Able to breathe comfortably  Description: Able to breathe comfortably  Outcome: Progressing

## 2025-05-29 NOTE — PLAN OF CARE
Patient walking independent in the hopson/going outside. Abdominal dressing has old drainage. Patient given ativan per CIWA score.    Problem: Pain  Goal: Verbalizes/displays adequate comfort level or baseline comfort level  5/29/2025 1843 by Sandra Easton RN  Outcome: Progressing  5/29/2025 0503 by Barbara Jim RN  Outcome: Progressing  Flowsheets (Taken 5/29/2025 0503)  Verbalizes/displays adequate comfort level or baseline comfort level:   Encourage patient to monitor pain and request assistance   Assess pain using appropriate pain scale   Administer analgesics based on type and severity of pain and evaluate response     Problem: Skin/Tissue Integrity - Adult  Goal: Incisions, wounds, or drain sites healing without S/S of infection  5/29/2025 1843 by Sandra Easton RN  Outcome: Progressing  Flowsheets (Taken 5/29/2025 0823 by Ema Moore RN)  Incisions, Wounds, or Drain Sites Healing Without Sign and Symptoms of Infection: TWICE DAILY: Assess and document skin integrity  5/29/2025 0503 by Barbara Jim RN  Outcome: Progressing     Problem: Gastrointestinal - Adult  Goal: Minimal or absence of nausea and vomiting  5/29/2025 1843 by Sandra Easton RN  Outcome: Progressing  5/29/2025 0503 by Barbara Jim RN  Outcome: Progressing     Problem: Metabolic/Fluid and Electrolytes - Adult  Goal: Electrolytes maintained within normal limits  5/29/2025 1843 by Sandra Easton RN  Outcome: Progressing  5/29/2025 0503 by Barbara Jim RN  Outcome: Progressing

## 2025-05-29 NOTE — RT PROTOCOL NOTE
RT Inhaler-Nebulizer Bronchodilator Protocol Note    There is a bronchodilator order in the chart from a provider indicating to follow the RT Bronchodilator Protocol and there is an “Initiate RT Inhaler-Nebulizer Bronchodilator Protocol” order as well (see protocol at bottom of note).    CXR Findings:  No results found.    The findings from the last RT Protocol Assessment were as follows:   History Pulmonary Disease: Smoker 15 pack years or more  Respiratory Pattern: Regular pattern and RR 12-20 bpm  Breath Sounds: Clear breath sounds  Cough: Strong, spontaneous, non-productive  Indication for Bronchodilator Therapy: None  Bronchodilator Assessment Score: 1    Aerosolized bronchodilator medication orders have been revised according to the RT Inhaler-Nebulizer Bronchodilator Protocol below.    Respiratory Therapist to perform RT Therapy Protocol Assessment initially then follow the protocol.  Repeat RT Therapy Protocol Assessment PRN for score 0-3 or on second treatment, BID, and PRN for scores above 3.    No Indications - adjust the frequency to every 6 hours PRN wheezing or bronchospasm, if no treatments needed after 48 hours then discontinue using Per Protocol order mode.     If indication present, adjust the RT bronchodilator orders based on the Bronchodilator Assessment Score as indicated below.  Use Inhaler orders unless patient has one or more of the following: on home nebulizer, not able to hold breath for 10 seconds, is not alert and oriented, cannot activate and use MDI correctly, or respiratory rate 25 breaths per minute or more, then use the equivalent nebulizer order(s) with same Frequency and PRN reasons based on the score.  If a patient is on this medication at home then do not decrease Frequency below that used at home.    0-3 - enter or revise RT bronchodilator order(s) to equivalent RT Bronchodilator order with Frequency of every 4 hours PRN for wheezing or increased work of breathing using Per  Protocol order mode.        4-6 - enter or revise RT Bronchodilator order(s) to two equivalent RT bronchodilator orders with one order with BID Frequency and one order with Frequency of every 4 hours PRN wheezing or increased work of breathing using Per Protocol order mode.        7-10 - enter or revise RT Bronchodilator order(s) to two equivalent RT bronchodilator orders with one order with TID Frequency and one order with Frequency of every 4 hours PRN wheezing or increased work of breathing using Per Protocol order mode.       11-13 - enter or revise RT Bronchodilator order(s) to one equivalent RT bronchodilator order with QID Frequency and an Albuterol order with Frequency of every 4 hours PRN wheezing or increased work of breathing using Per Protocol order mode.      Greater than 13 - enter or revise RT Bronchodilator order(s) to one equivalent RT bronchodilator order with every 4 hours Frequency and an Albuterol order with Frequency of every 2 hours PRN wheezing or increased work of breathing using Per Protocol order mode.     RT to enter RT Home Evaluation for COPD & MDI Assessment order using Per Protocol order mode.    Electronically signed by PEPE CULVER RCP on 5/29/2025 at 10:47 AM

## 2025-05-29 NOTE — CARE COORDINATION
Case Management Assessment  Initial Evaluation    Date/Time of Evaluation: 5/29/2025 11:12 AM  Assessment Completed by: EMERSON HUGHES RN    If patient is discharged prior to next notation, then this note serves as note for discharge by case management.    Patient Name: Abdifatah De Anda                   YOB: 1985  Diagnosis: Diverticulosis [K57.90]  Diverticular disease [K57.90]                   Date / Time: 5/28/2025  7:43 AM    Patient Admission Status: Inpatient   Readmission Risk (Low < 19, Mod (19-27), High > 27): Readmission Risk Score: 10    Current PCP: Kelby Swain MD  PCP verified by CM? Yes    Chart Reviewed: Yes      History Provided by: Patient  Patient Orientation: Alert and Oriented    Patient Cognition: Alert    Hospitalization in the last 30 days (Readmission):  No    If yes, Readmission Assessment in CM Navigator will be completed.    Advance Directives:      Code Status: Full Code   Patient's Primary Decision Maker is: Legal Next of Kin      Discharge Planning:    Patient lives with: Alone Type of Home: Apartment  Primary Care Giver: Self  Patient Support Systems include: Family Members   Current Financial resources: Other (Comment) (MMO)  Current community resources: None  Current services prior to admission: None            Current DME:              Type of Home Care services:  None    ADLS  Prior functional level: Independent in ADLs/IADLs  Current functional level: Independent in ADLs/IADLs    PT AM-PAC:   /24  OT AM-PAC:   /24    Family can provide assistance at DC: Yes  Would you like Case Management to discuss the discharge plan with any other family members/significant others, and if so, who? No  Plans to Return to Present Housing: Yes  Other Identified Issues/Barriers to RETURNING to current housing: pain controlled and return of bowel function  Potential Assistance needed at discharge: N/A            Potential DME:    Patient expects to discharge to: House  supports the patient's individualized plan of care/goals and shares the quality data associated with the providers was provided to: Patient   Patient Representative Name:       The Patient and/or Patient Representative Agree with the Discharge Plan? Yes    EMERSON HUGHES RN  Case Management Department

## 2025-05-30 LAB
ANION GAP SERPL CALCULATED.3IONS-SCNC: 8 MMOL/L (ref 9–16)
BASOPHILS # BLD: 0.06 K/UL (ref 0–0.2)
BASOPHILS NFR BLD: 1 % (ref 0–2)
BUN SERPL-MCNC: 8 MG/DL (ref 6–20)
CALCIUM SERPL-MCNC: 9.4 MG/DL (ref 8.6–10.4)
CHLORIDE SERPL-SCNC: 101 MMOL/L (ref 98–107)
CO2 SERPL-SCNC: 25 MMOL/L (ref 20–31)
CREAT SERPL-MCNC: 0.6 MG/DL (ref 0.7–1.2)
EOSINOPHIL # BLD: 0.18 K/UL (ref 0–0.44)
EOSINOPHILS RELATIVE PERCENT: 3 % (ref 1–4)
ERYTHROCYTE [DISTWIDTH] IN BLOOD BY AUTOMATED COUNT: 13.2 % (ref 11.8–14.4)
GFR, ESTIMATED: >90 ML/MIN/1.73M2
GLUCOSE BLD-MCNC: 98 MG/DL (ref 75–110)
GLUCOSE SERPL-MCNC: 105 MG/DL (ref 74–99)
HCT VFR BLD AUTO: 35.1 % (ref 40.7–50.3)
HGB BLD-MCNC: 12.2 G/DL (ref 13–17)
IMM GRANULOCYTES # BLD AUTO: 0.01 K/UL (ref 0–0.3)
IMM GRANULOCYTES NFR BLD: 0 %
LYMPHOCYTES NFR BLD: 1.14 K/UL (ref 1.1–3.7)
LYMPHOCYTES RELATIVE PERCENT: 17 % (ref 24–43)
MCH RBC QN AUTO: 32.5 PG (ref 25.2–33.5)
MCHC RBC AUTO-ENTMCNC: 34.8 G/DL (ref 28.4–34.8)
MCV RBC AUTO: 93.6 FL (ref 82.6–102.9)
MONOCYTES NFR BLD: 0.67 K/UL (ref 0.1–1.2)
MONOCYTES NFR BLD: 10 % (ref 3–12)
NEUTROPHILS NFR BLD: 69 % (ref 36–65)
NEUTS SEG NFR BLD: 4.74 K/UL (ref 1.5–8.1)
NRBC BLD-RTO: 0 PER 100 WBC
PLATELET # BLD AUTO: 160 K/UL (ref 138–453)
PMV BLD AUTO: 10.2 FL (ref 8.1–13.5)
POTASSIUM SERPL-SCNC: 5 MMOL/L (ref 3.7–5.3)
RBC # BLD AUTO: 3.75 M/UL (ref 4.21–5.77)
SODIUM SERPL-SCNC: 134 MMOL/L (ref 136–145)
SURGICAL PATHOLOGY REPORT: NORMAL
WBC OTHER # BLD: 6.8 K/UL (ref 3.5–11.3)

## 2025-05-30 PROCEDURE — 6370000000 HC RX 637 (ALT 250 FOR IP)

## 2025-05-30 PROCEDURE — 1200000000 HC SEMI PRIVATE

## 2025-05-30 PROCEDURE — 6360000002 HC RX W HCPCS: Performed by: SURGERY

## 2025-05-30 PROCEDURE — 80048 BASIC METABOLIC PNL TOTAL CA: CPT

## 2025-05-30 PROCEDURE — 2500000003 HC RX 250 WO HCPCS: Performed by: SURGERY

## 2025-05-30 PROCEDURE — 82947 ASSAY GLUCOSE BLOOD QUANT: CPT

## 2025-05-30 PROCEDURE — 6370000000 HC RX 637 (ALT 250 FOR IP): Performed by: SURGERY

## 2025-05-30 PROCEDURE — 85025 COMPLETE CBC W/AUTO DIFF WBC: CPT

## 2025-05-30 PROCEDURE — 94640 AIRWAY INHALATION TREATMENT: CPT

## 2025-05-30 PROCEDURE — 36415 COLL VENOUS BLD VENIPUNCTURE: CPT

## 2025-05-30 RX ORDER — IPRATROPIUM BROMIDE AND ALBUTEROL SULFATE 2.5; .5 MG/3ML; MG/3ML
1 SOLUTION RESPIRATORY (INHALATION) EVERY 6 HOURS PRN
Status: DISCONTINUED | OUTPATIENT
Start: 2025-05-30 | End: 2025-06-01 | Stop reason: HOSPADM

## 2025-05-30 RX ORDER — LISINOPRIL 20 MG/1
20 TABLET ORAL DAILY
Status: DISCONTINUED | OUTPATIENT
Start: 2025-05-30 | End: 2025-06-01 | Stop reason: HOSPADM

## 2025-05-30 RX ADMIN — ONDANSETRON 4 MG: 2 INJECTION, SOLUTION INTRAMUSCULAR; INTRAVENOUS at 05:36

## 2025-05-30 RX ADMIN — LORAZEPAM 1 MG: 1 TABLET ORAL at 11:33

## 2025-05-30 RX ADMIN — OXYCODONE HYDROCHLORIDE 10 MG: 5 TABLET ORAL at 08:09

## 2025-05-30 RX ADMIN — ACETAMINOPHEN 650 MG: 325 TABLET ORAL at 08:09

## 2025-05-30 RX ADMIN — POTASSIUM CHLORIDE, DEXTROSE MONOHYDRATE AND SODIUM CHLORIDE: 150; 5; 450 INJECTION, SOLUTION INTRAVENOUS at 11:45

## 2025-05-30 RX ADMIN — IPRATROPIUM BROMIDE AND ALBUTEROL SULFATE 1 DOSE: .5; 2.5 SOLUTION RESPIRATORY (INHALATION) at 23:46

## 2025-05-30 RX ADMIN — ACETAMINOPHEN 650 MG: 325 TABLET ORAL at 21:26

## 2025-05-30 RX ADMIN — MORPHINE SULFATE 2 MG: 2 INJECTION, SOLUTION INTRAMUSCULAR; INTRAVENOUS at 05:36

## 2025-05-30 RX ADMIN — KETOROLAC TROMETHAMINE 30 MG: 30 INJECTION, SOLUTION INTRAMUSCULAR at 05:00

## 2025-05-30 RX ADMIN — LORAZEPAM 1 MG: 1 TABLET ORAL at 00:16

## 2025-05-30 RX ADMIN — OXYCODONE HYDROCHLORIDE 10 MG: 5 TABLET ORAL at 18:47

## 2025-05-30 RX ADMIN — THIAMINE HYDROCHLORIDE 100 MG: 100 INJECTION, SOLUTION INTRAMUSCULAR; INTRAVENOUS at 08:08

## 2025-05-30 RX ADMIN — KETOROLAC TROMETHAMINE 30 MG: 30 INJECTION, SOLUTION INTRAMUSCULAR at 21:26

## 2025-05-30 RX ADMIN — OXYCODONE HYDROCHLORIDE 10 MG: 5 TABLET ORAL at 02:15

## 2025-05-30 RX ADMIN — LORAZEPAM 3 MG: 1 TABLET ORAL at 21:25

## 2025-05-30 RX ADMIN — ACETAMINOPHEN 650 MG: 325 TABLET ORAL at 15:13

## 2025-05-30 RX ADMIN — LORAZEPAM 1 MG: 1 TABLET ORAL at 01:20

## 2025-05-30 RX ADMIN — ACETAMINOPHEN 650 MG: 325 TABLET ORAL at 05:32

## 2025-05-30 RX ADMIN — FAMOTIDINE 20 MG: 20 TABLET, FILM COATED ORAL at 21:26

## 2025-05-30 RX ADMIN — LISINOPRIL 20 MG: 20 TABLET ORAL at 18:47

## 2025-05-30 RX ADMIN — KETOROLAC TROMETHAMINE 30 MG: 30 INJECTION, SOLUTION INTRAMUSCULAR at 08:10

## 2025-05-30 RX ADMIN — POTASSIUM CHLORIDE, DEXTROSE MONOHYDRATE AND SODIUM CHLORIDE: 150; 5; 450 INJECTION, SOLUTION INTRAVENOUS at 01:19

## 2025-05-30 RX ADMIN — KETOROLAC TROMETHAMINE 30 MG: 30 INJECTION, SOLUTION INTRAMUSCULAR at 15:13

## 2025-05-30 RX ADMIN — LORAZEPAM 1 MG: 1 TABLET ORAL at 10:22

## 2025-05-30 RX ADMIN — OXYCODONE HYDROCHLORIDE 10 MG: 5 TABLET ORAL at 13:38

## 2025-05-30 RX ADMIN — OXYCODONE HYDROCHLORIDE 10 MG: 5 TABLET ORAL at 22:50

## 2025-05-30 RX ADMIN — LORAZEPAM 2 MG: 1 TABLET ORAL at 15:13

## 2025-05-30 RX ADMIN — SODIUM CHLORIDE, PRESERVATIVE FREE 10 ML: 5 INJECTION INTRAVENOUS at 21:31

## 2025-05-30 RX ADMIN — FAMOTIDINE 20 MG: 20 TABLET, FILM COATED ORAL at 08:10

## 2025-05-30 ASSESSMENT — PAIN DESCRIPTION - FREQUENCY
FREQUENCY: CONTINUOUS

## 2025-05-30 ASSESSMENT — PAIN SCALES - GENERAL
PAINLEVEL_OUTOF10: 8
PAINLEVEL_OUTOF10: 8
PAINLEVEL_OUTOF10: 7
PAINLEVEL_OUTOF10: 8
PAINLEVEL_OUTOF10: 8
PAINLEVEL_OUTOF10: 10
PAINLEVEL_OUTOF10: 9

## 2025-05-30 ASSESSMENT — PAIN DESCRIPTION - DESCRIPTORS
DESCRIPTORS: ACHING;SORE
DESCRIPTORS: ACHING
DESCRIPTORS: ACHING;SORE

## 2025-05-30 ASSESSMENT — PAIN DESCRIPTION - LOCATION
LOCATION: ABDOMEN

## 2025-05-30 ASSESSMENT — PAIN DESCRIPTION - PAIN TYPE
TYPE: SURGICAL PAIN

## 2025-05-30 ASSESSMENT — PAIN - FUNCTIONAL ASSESSMENT
PAIN_FUNCTIONAL_ASSESSMENT: ACTIVITIES ARE NOT PREVENTED
PAIN_FUNCTIONAL_ASSESSMENT: ACTIVITIES ARE NOT PREVENTED

## 2025-05-30 ASSESSMENT — PAIN DESCRIPTION - ORIENTATION: ORIENTATION: MID

## 2025-05-30 ASSESSMENT — PAIN DESCRIPTION - ONSET: ONSET: ON-GOING

## 2025-05-30 NOTE — PLAN OF CARE
Problem: Discharge Planning  Goal: Discharge to home or other facility with appropriate resources  5/30/2025 0108 by Bob Finch RN  Outcome: Progressing  Flowsheets (Taken 5/29/2025 2000)  Discharge to home or other facility with appropriate resources:   Identify barriers to discharge with patient and caregiver   Arrange for needed discharge resources and transportation as appropriate   Identify discharge learning needs (meds, wound care, etc)   Refer to discharge planning if patient needs post-hospital services based on physician order or complex needs related to functional status, cognitive ability or social support system     Problem: Pain  Goal: Verbalizes/displays adequate comfort level or baseline comfort level  5/30/2025 0108 by Bob Finch RN  Outcome: Progressing     Problem: Skin/Tissue Integrity - Adult  Goal: Incisions, wounds, or drain sites healing without S/S of infection  5/30/2025 0108 by Bob Finch RN  Outcome: Progressing  Flowsheets (Taken 5/29/2025 2000)  Incisions, Wounds, or Drain Sites Healing Without Sign and Symptoms of Infection: TWICE DAILY: Assess and document skin integrity     Problem: Musculoskeletal - Adult  Goal: Return mobility to safest level of function  5/30/2025 0108 by Bob Finch RN  Outcome: Progressing     Problem: Gastrointestinal - Adult  Goal: Minimal or absence of nausea and vomiting  5/30/2025 0108 by Bob Finch RN  Outcome: Progressing  Flowsheets (Taken 5/29/2025 2000)  Minimal or absence of nausea and vomiting:   Administer IV fluids as ordered to ensure adequate hydration   Provide nonpharmacologic comfort measures as appropriate   Administer ordered antiemetic medications as needed     Problem: Gastrointestinal - Adult  Goal: Maintains adequate nutritional intake  5/30/2025 0108 by Bob Finch RN  Outcome: Progressing  Flowsheets (Taken 5/29/2025 2000)  Maintains adequate nutritional intake:   Monitor percentage of each meal  consumed   Assist with meals as needed   Identify factors contributing to decreased intake, treat as appropriate   Monitor intake and output, weight and lab values     Problem: Metabolic/Fluid and Electrolytes - Adult  Goal: Electrolytes maintained within normal limits  5/30/2025 0108 by Bob Finch, RN  Outcome: Progressing  Flowsheets (Taken 5/29/2025 2000)  Electrolytes maintained within normal limits: Monitor labs and assess patient for signs and symptoms of electrolyte imbalances

## 2025-05-30 NOTE — PROGRESS NOTES
Spiritual Health History and Assessment/Progress Note  [facility]    (P) Initial Encounter,  ,  ,      Name: Abdifatah De Anda MRN: [id number]    Age: 40 y.o.     Sex: male   Language: English   Gnosticism: None   Diverticular disease     Date: 5/30/2025            Total Time Calculated: (P) 4 min              Spiritual Assessment began in [facility]        Referral/Consult From: (P) Rounding   Encounter Overview/Reason: (P) Initial Encounter  Service Provided For: (P) Patient    Patient engaged briefly with  as they both acknowledged having met before during patient's previous hospital stay. Patient talked again of his diverticular problems but indicated having no present spiritual care concerns or needs.    Stella, Belief, Meaning:   Patient identifies as spiritual  Family/Friends No family/friends present      Importance and Influence:  Patient has no beliefs influential to healthcare decision-making identified during this visit  Family/Friends No family/friends present    Community:  Patient feels well-supported. Support system includes: Other: family members  Family/Friends No family/friends present    Assessment and Plan of Care:     Patient Interventions include: Facilitated expression of thoughts and feelings  Family/Friends Interventions include: No family/friends present    Patient Plan of Care: Spiritual Care available upon further referral  Family/Friends Plan of Care: Spiritual Care available upon further referral    Electronically signed by Chaplain Clay on 5/30/2025 at 10:21 AM

## 2025-05-30 NOTE — PLAN OF CARE
Problem: Discharge Planning  Goal: Discharge to home or other facility with appropriate resources  Outcome: Progressing  Flowsheets (Taken 5/30/2025 0819)  Discharge to home or other facility with appropriate resources:   Identify barriers to discharge with patient and caregiver   Arrange for needed discharge resources and transportation as appropriate   Identify discharge learning needs (meds, wound care, etc)     Problem: Pain  Goal: Verbalizes/displays adequate comfort level or baseline comfort level  Outcome: Progressing     Problem: Neurosensory - Adult  Goal: Achieves stable or improved neurological status  Outcome: Progressing  Flowsheets (Taken 5/30/2025 0819)  Achieves stable or improved neurological status:   Assess for and report changes in neurological status   Initiate measures to prevent increased intracranial pressure   Maintain blood pressure and fluid volume within ordered parameters to optimize cerebral perfusion and minimize risk of hemorrhage   Monitor temperature, glucose, and sodium. Initiate appropriate interventions as ordered     Problem: Skin/Tissue Integrity - Adult  Goal: Incisions, wounds, or drain sites healing without S/S of infection  Outcome: Progressing  Flowsheets  Taken 5/30/2025 1104  Incisions, Wounds, or Drain Sites Healing Without Sign and Symptoms of Infection: TWICE DAILY: Assess and document skin integrity  Taken 5/30/2025 0819  Incisions, Wounds, or Drain Sites Healing Without Sign and Symptoms of Infection: TWICE DAILY: Assess and document skin integrity     Problem: Musculoskeletal - Adult  Goal: Return mobility to safest level of function  Outcome: Progressing  Flowsheets (Taken 5/30/2025 0819)  Return Mobility to Safest Level of Function:   Assess patient stability and activity tolerance for standing, transferring and ambulating with or without assistive devices   Assist with transfers and ambulation using safe patient handling equipment as needed   Ensure adequate  electrolyte imbalances   Administer electrolyte replacement as ordered   Monitor response to electrolyte replacements, including repeat lab results as appropriate     Problem: Respiratory - Adult  Goal: Achieves optimal ventilation and oxygenation  Outcome: Progressing  Flowsheets (Taken 5/30/2025 0819)  Achieves optimal ventilation and oxygenation:   Assess for changes in respiratory status   Assess for changes in mentation and behavior   Position to facilitate oxygenation and minimize respiratory effort     Problem: ABCDS Injury Assessment  Goal: Absence of physical injury  Outcome: Progressing     Problem: Anxiety  Goal: Will report anxiety at manageable levels  Description: INTERVENTIONS:1. Administer medication as ordered2. Teach and rehearse alternative coping skills3. Provide emotional support with 1:1 interaction with staff  Outcome: Progressing     Problem: Drug Abuse/Detox  Goal: Will have no detox symptoms and will verbalize plan for changing drug-related behavior  Description: INTERVENTIONS:1. Administer medication as ordered2. Monitor physical status3. Provide emotional support with 1:1 interaction with staff4. Encourage  recovery focused treatment   Outcome: Progressing

## 2025-05-30 NOTE — PROGRESS NOTES
General Surgery:  Daily Progress Note          PATIENT NAME: Abdifatah De Anda     TODAY'S DATE: 5/30/2025, 6:11 AM  CC: I feel better today    SUBJECTIVE:     Pt seen and examined at bedside.  Patient is sitting up in bed.  He reports his pain is better today.  Pain medications do not seem to be helping to reduce the pain at this time.  He is tolerating clear liquid diet.  Voiding appropriately.  He reports a small amount of flatus, but no bowel movement at this time.  Abdominal dressing with minimal strikethrough.  Packing and dressing were changed at bedside this morning.  Patient continues to walk outside for regular smoke breaks at this time.  He was not available for examination on rounding with Dr. Patel this morning.    OBJECTIVE:   VITALS:  BP (!) 147/102   Pulse 81   Temp 97.9 °F (36.6 °C) (Oral)   Resp 18   Ht 1.651 m (5' 5\")   Wt 67.4 kg (148 lb 9.4 oz)   SpO2 98%   BMI 24.73 kg/m²      INTAKE/OUTPUT:      Intake/Output Summary (Last 24 hours) at 5/30/2025 0611  Last data filed at 5/30/2025 0607  Gross per 24 hour   Intake 3409.77 ml   Output --   Net 3409.77 ml     PHYSICAL EXAM:  General Appearance: awake, alert, oriented, in no acute distress, well developed, well nourished, and in no acute distress  HEENT:  Normocephalic, atraumatic, mucus membranes moist   Heart: Heart regular rate and rhythm  Lungs: Normal expansion.  Clear to auscultation.  No rales, rhonchi, or wheezing.  Abdomen: Soft, mildly distended, appropriate tenderness to palpation. Dressing with minimal strikethrough.  Packing and dressing changed this morning.  Extremities: No cyanosis, pitting edema, rashes noted.    Skin: Skin color, texture, turgor normal. No rashes or lesions.    Data:  CBC with Differential:    Lab Results   Component Value Date/Time    WBC 6.8 05/30/2025 05:25 AM    RBC 3.75 05/30/2025 05:25 AM    HGB 12.2 05/30/2025 05:25 AM    HCT 35.1 05/30/2025 05:25 AM     05/30/2025 05:25 AM    MCV 93.6

## 2025-05-31 LAB
ANION GAP SERPL CALCULATED.3IONS-SCNC: 12 MMOL/L (ref 9–16)
BASOPHILS # BLD: 0.07 K/UL (ref 0–0.2)
BASOPHILS NFR BLD: 1 % (ref 0–2)
BUN SERPL-MCNC: 8 MG/DL (ref 6–20)
CALCIUM SERPL-MCNC: 9.6 MG/DL (ref 8.6–10.4)
CHLORIDE SERPL-SCNC: 98 MMOL/L (ref 98–107)
CO2 SERPL-SCNC: 24 MMOL/L (ref 20–31)
CREAT SERPL-MCNC: 0.6 MG/DL (ref 0.7–1.2)
EOSINOPHIL # BLD: 0.37 K/UL (ref 0–0.44)
EOSINOPHILS RELATIVE PERCENT: 5 % (ref 1–4)
ERYTHROCYTE [DISTWIDTH] IN BLOOD BY AUTOMATED COUNT: 13.2 % (ref 11.8–14.4)
GFR, ESTIMATED: >90 ML/MIN/1.73M2
GLUCOSE BLD-MCNC: 93 MG/DL (ref 75–110)
GLUCOSE SERPL-MCNC: 91 MG/DL (ref 74–99)
HCT VFR BLD AUTO: 36.6 % (ref 40.7–50.3)
HGB BLD-MCNC: 12.9 G/DL (ref 13–17)
IMM GRANULOCYTES # BLD AUTO: 0.02 K/UL (ref 0–0.3)
IMM GRANULOCYTES NFR BLD: 0 %
LYMPHOCYTES NFR BLD: 1.03 K/UL (ref 1.1–3.7)
LYMPHOCYTES RELATIVE PERCENT: 14 % (ref 24–43)
MCH RBC QN AUTO: 32.8 PG (ref 25.2–33.5)
MCHC RBC AUTO-ENTMCNC: 35.2 G/DL (ref 28.4–34.8)
MCV RBC AUTO: 93.1 FL (ref 82.6–102.9)
MONOCYTES NFR BLD: 0.66 K/UL (ref 0.1–1.2)
MONOCYTES NFR BLD: 9 % (ref 3–12)
NEUTROPHILS NFR BLD: 71 % (ref 36–65)
NEUTS SEG NFR BLD: 5.45 K/UL (ref 1.5–8.1)
NRBC BLD-RTO: 0 PER 100 WBC
PLATELET # BLD AUTO: 172 K/UL (ref 138–453)
PMV BLD AUTO: 9.7 FL (ref 8.1–13.5)
POTASSIUM SERPL-SCNC: 4.4 MMOL/L (ref 3.7–5.3)
RBC # BLD AUTO: 3.93 M/UL (ref 4.21–5.77)
SODIUM SERPL-SCNC: 134 MMOL/L (ref 136–145)
WBC OTHER # BLD: 7.6 K/UL (ref 3.5–11.3)

## 2025-05-31 PROCEDURE — 6360000002 HC RX W HCPCS: Performed by: SURGERY

## 2025-05-31 PROCEDURE — 2500000003 HC RX 250 WO HCPCS: Performed by: SURGERY

## 2025-05-31 PROCEDURE — 82947 ASSAY GLUCOSE BLOOD QUANT: CPT

## 2025-05-31 PROCEDURE — 80048 BASIC METABOLIC PNL TOTAL CA: CPT

## 2025-05-31 PROCEDURE — 1200000000 HC SEMI PRIVATE

## 2025-05-31 PROCEDURE — 36415 COLL VENOUS BLD VENIPUNCTURE: CPT

## 2025-05-31 PROCEDURE — 85025 COMPLETE CBC W/AUTO DIFF WBC: CPT

## 2025-05-31 PROCEDURE — 6370000000 HC RX 637 (ALT 250 FOR IP): Performed by: SURGERY

## 2025-05-31 RX ADMIN — LISINOPRIL 20 MG: 20 TABLET ORAL at 08:24

## 2025-05-31 RX ADMIN — LORAZEPAM 2 MG: 1 TABLET ORAL at 10:44

## 2025-05-31 RX ADMIN — ENOXAPARIN SODIUM 40 MG: 100 INJECTION SUBCUTANEOUS at 08:27

## 2025-05-31 RX ADMIN — KETOROLAC TROMETHAMINE 30 MG: 30 INJECTION, SOLUTION INTRAMUSCULAR at 08:22

## 2025-05-31 RX ADMIN — OXYCODONE HYDROCHLORIDE 10 MG: 5 TABLET ORAL at 13:14

## 2025-05-31 RX ADMIN — FAMOTIDINE 20 MG: 20 TABLET, FILM COATED ORAL at 08:24

## 2025-05-31 RX ADMIN — MORPHINE SULFATE 4 MG: 4 INJECTION, SOLUTION INTRAMUSCULAR; INTRAVENOUS at 06:08

## 2025-05-31 RX ADMIN — OXYCODONE HYDROCHLORIDE 10 MG: 5 TABLET ORAL at 08:22

## 2025-05-31 RX ADMIN — KETOROLAC TROMETHAMINE 30 MG: 30 INJECTION, SOLUTION INTRAMUSCULAR at 15:42

## 2025-05-31 RX ADMIN — ACETAMINOPHEN 650 MG: 325 TABLET ORAL at 21:04

## 2025-05-31 RX ADMIN — LORAZEPAM 1 MG: 1 TABLET ORAL at 21:05

## 2025-05-31 RX ADMIN — LORAZEPAM 2 MG: 1 TABLET ORAL at 06:21

## 2025-05-31 RX ADMIN — THIAMINE HYDROCHLORIDE 100 MG: 100 INJECTION, SOLUTION INTRAMUSCULAR; INTRAVENOUS at 08:24

## 2025-05-31 RX ADMIN — FAMOTIDINE 20 MG: 20 TABLET, FILM COATED ORAL at 21:05

## 2025-05-31 RX ADMIN — OXYCODONE HYDROCHLORIDE 10 MG: 5 TABLET ORAL at 17:02

## 2025-05-31 RX ADMIN — KETOROLAC TROMETHAMINE 30 MG: 30 INJECTION, SOLUTION INTRAMUSCULAR at 21:04

## 2025-05-31 RX ADMIN — SODIUM CHLORIDE, PRESERVATIVE FREE 10 ML: 5 INJECTION INTRAVENOUS at 21:03

## 2025-05-31 RX ADMIN — ACETAMINOPHEN 650 MG: 325 TABLET ORAL at 08:24

## 2025-05-31 RX ADMIN — LORAZEPAM 2 MG: 1 TABLET ORAL at 15:42

## 2025-05-31 RX ADMIN — ACETAMINOPHEN 650 MG: 325 TABLET ORAL at 15:42

## 2025-05-31 RX ADMIN — LORAZEPAM 2 MG: 1 TABLET ORAL at 23:58

## 2025-05-31 RX ADMIN — OXYCODONE HYDROCHLORIDE 10 MG: 5 TABLET ORAL at 23:38

## 2025-05-31 RX ADMIN — SODIUM CHLORIDE, PRESERVATIVE FREE 10 ML: 5 INJECTION INTRAVENOUS at 08:27

## 2025-05-31 ASSESSMENT — PAIN DESCRIPTION - LOCATION
LOCATION: ABDOMEN
LOCATION: ABDOMEN;BACK
LOCATION: ABDOMEN

## 2025-05-31 ASSESSMENT — PAIN DESCRIPTION - DESCRIPTORS
DESCRIPTORS: ACHING
DESCRIPTORS: ACHING;THROBBING
DESCRIPTORS: ACHING
DESCRIPTORS: ACHING;SORE
DESCRIPTORS: THROBBING

## 2025-05-31 ASSESSMENT — PAIN SCALES - GENERAL
PAINLEVEL_OUTOF10: 8
PAINLEVEL_OUTOF10: 10
PAINLEVEL_OUTOF10: 9
PAINLEVEL_OUTOF10: 7
PAINLEVEL_OUTOF10: 10
PAINLEVEL_OUTOF10: 8
PAINLEVEL_OUTOF10: 7
PAINLEVEL_OUTOF10: 4
PAINLEVEL_OUTOF10: 4

## 2025-05-31 ASSESSMENT — PAIN DESCRIPTION - FREQUENCY
FREQUENCY: CONTINUOUS
FREQUENCY: CONTINUOUS

## 2025-05-31 ASSESSMENT — PAIN DESCRIPTION - PAIN TYPE
TYPE: SURGICAL PAIN
TYPE: SURGICAL PAIN

## 2025-05-31 ASSESSMENT — PAIN DESCRIPTION - DIRECTION: RADIATING_TOWARDS: BACK

## 2025-05-31 NOTE — PROGRESS NOTES
General Surgery:  Daily Progress Note          PATIENT NAME: Abdifatah De Anda     TODAY'S DATE: 5/31/2025, 6:42 AM  CC: I feel better today    SUBJECTIVE:     Pt seen and examined at bedside.  Patient is sitting up in bed.  He reports his pain is moderately controlled at this time. He is tolerating a full liquid diet, although has not had much appetite yesterday. Voiding appropriately. Patient reports multiple bowel movements yesterday. Abdominal dressing with minimal strikethrough.  Packing and dressing were changed at bedside this morning.  Patient continues to walk outside for regular smoke breaks at this time.     OBJECTIVE:   VITALS:  BP (!) 136/100   Pulse 86   Temp 98.1 °F (36.7 °C)   Resp 16   Ht 1.651 m (5' 5\")   Wt 67.4 kg (148 lb 9.4 oz)   SpO2 95%   BMI 24.73 kg/m²      INTAKE/OUTPUT:      Intake/Output Summary (Last 24 hours) at 5/31/2025 0642  Last data filed at 5/30/2025 1614  Gross per 24 hour   Intake 996.86 ml   Output --   Net 996.86 ml     PHYSICAL EXAM:  General Appearance: awake, alert, oriented, in no acute distress, well developed, well nourished, and in no acute distress  HEENT:  Normocephalic, atraumatic, mucus membranes moist   Heart: Heart regular rate and rhythm  Lungs: Normal expansion.  Clear to auscultation.  No rales, rhonchi, or wheezing.  Abdomen: Soft, mildly distended, appropriate tenderness to palpation.  Midline incision with staples and minimal erythema.  Colostomy site loosely approximated with staples with minimal erythema.  No drainage from either site.  Dressing with minimal strikethrough.  Packing and dressing changed this morning.  Extremities: No cyanosis, pitting edema, rashes noted.    Skin: Skin color, texture, turgor normal. No rashes or lesions.    Data:  CBC with Differential:    Lab Results   Component Value Date/Time    WBC 6.8 05/30/2025 05:25 AM    RBC 3.75 05/30/2025 05:25 AM    HGB 12.2 05/30/2025 05:25 AM    HCT 35.1 05/30/2025 05:25 AM      05/30/2025 05:25 AM    MCV 93.6 05/30/2025 05:25 AM    MCH 32.5 05/30/2025 05:25 AM    MCHC 34.8 05/30/2025 05:25 AM    RDW 13.2 05/30/2025 05:25 AM    LYMPHOPCT 17 05/30/2025 05:25 AM    MONOPCT 10 05/30/2025 05:25 AM    EOSPCT 3 05/30/2025 05:25 AM    BASOPCT 1 05/30/2025 05:25 AM    MONOSABS 0.67 05/30/2025 05:25 AM    LYMPHSABS 1.14 05/30/2025 05:25 AM    EOSABS 0.18 05/30/2025 05:25 AM    BASOSABS 0.06 05/30/2025 05:25 AM    DIFFTYPE NOT REPORTED 09/02/2020 09:53 AM     BMP:    Lab Results   Component Value Date/Time     05/31/2025 05:53 AM    K 4.4 05/31/2025 05:53 AM    CL 98 05/31/2025 05:53 AM    CO2 24 05/31/2025 05:53 AM    BUN 8 05/31/2025 05:53 AM    CREATININE 0.6 05/31/2025 05:53 AM    CALCIUM 9.6 05/31/2025 05:53 AM    GFRAA >60 09/03/2020 05:49 AM    LABGLOM >90 05/31/2025 05:53 AM    GLUCOSE 91 05/31/2025 05:53 AM     Radiology Review:      No results found.    ASSESSMENT:  Active Hospital Problems    Diagnosis Date Noted    Diverticular disease [K57.90] 05/28/2025     40 y.o. male with diverticulosis and diverticulitis status post exploratory laparotomy with sigmoid resection and colostomy creation on 10/2024.  - s/p partial colectomy with reversal of colostomy, repair of incisional hernia, appendectomy on 5/28/2025.  - Pathology: Pending    Plan:  Diet: Advance to low fiber diet.  Analgesia: Tylenol, Toradol, Roxicodone, morphine  GI prophylaxis: Pepcid  DVT prophylaxis: Lovenox  Activity:  Continue to encourage ambulation/activity  Okay to Hep-Lock patient's IV to allow him to ambulate more frequently.  Continue to encourage incentive spirometry  Avoid smoking as this can impair healing.  [facility] protocol for alcohol abuse.  DC planning: Discussed with patient if he would like to go home today.  He reports that he would be more comfortable leaving tomorrow.  Plan for discharge home tomorrow.  Patient's father will be able to help him with wound packing.    Electronically signed by Aaron RAI

## 2025-05-31 NOTE — DISCHARGE INSTR - COC
Continuity of Care Form    Patient Name: Abdifatah DeA nda   :  1985  MRN:  [id number]    Admit date:  2025  Discharge date:  25    Code Status Order: Full Code   Advance Directives:    Date/Time Healthcare Directive Type of Healthcare Directive Copy in Chart Healthcare Agent Appointed Healthcare Agent's Name Healthcare Agent's Phone Number    25 0824 No, patient does not have an advance directive for healthcare treatment  --  --  --  --  --             Admitting Physician:  Thien Patel MD  PCP: Kelby Swain MD    Discharging Nurse: ***  Discharging Hospital Unit/Room#:   Discharging Unit Phone Number: [phone]    Emergency Contact:   Extended Emergency Contact Information  Primary Emergency Contact: OBI MCMULLEN  Home Phone: [phone]  Mobile Phone: [phone]  Relation: Other   needed? No  Secondary Emergency Contact: GENET CARRANZA  Home Phone: [phone]  Relation: Brother/Sister   needed? No    Past Surgical History:  Past Surgical History:   Procedure Laterality Date    BACK SURGERY Bilateral 2020    L4-S1 TLIF BILATERAL DECOMPRESSION L5-S1 RIGHT DECOMPRESSION L4-5 NUVASIVE 2 C-ARMS CELLSAVER - Bilateral    CARDIAC VALVE SURGERY      as an infant    COLONOSCOPY      LAPAROTOMY N/A 10/02/2024    LAPAROTOMY EXPLORATORY DESCENDING COLOSTOMY, SIGMOID RESECTION, drainage of pelvic abscess, tap block performed by Thien Patel MD at [facility] OR    LUMBAR SPINE SURGERY Bilateral 2020    L4-S1 TLIF   BILATERAL DECOMPRESSION   L5-S1       RIGHT DECOMPRESSION L4-5   NUVASIVE    2 C-ARMS    CELLSAVER performed by Raad Salinas MD at [facility] OR    SMALL INTESTINE SURGERY N/A 2025    Partial colectomy, reversal of colostomy, lysis of adhesion, repair incisional hernia , tap block, removal of eschar performed by Thien Patel MD at [facility] OR    TONSILLECTOMY         Immunization History:     There is no immunization history on file for this

## 2025-05-31 NOTE — PLAN OF CARE
Problem: Respiratory - Adult  Goal: Clear lung sounds  Outcome: Progressing     Problem: Respiratory - Adult  Goal: Able to breathe comfortably  Description: Able to breathe comfortably  Outcome: Progressing         BRONCHOSPASM/BRONCHOCONSTRICTION    IMPROVE  AERATION/BREATHSOUNDS  ADMINISTER BRONCHODILATOR THERAPY AS APPROPRIATE  ASSESS BREATH SOUNDS  INITIATE AEROSOL PROTOCOL IF ORDERED TO DO SO  PATIENT EDUCATION AS NEEDED

## 2025-05-31 NOTE — PLAN OF CARE
Problem: Discharge Planning  Goal: Discharge to home or other facility with appropriate resources  Outcome: Progressing     Problem: Pain  Goal: Verbalizes/displays adequate comfort level or baseline comfort level  Outcome: Progressing       Problem: Neurosensory - Adult  Goal: Achieves stable or improved neurological status  Outcome: Progressing     Problem: Musculoskeletal - Adult  Goal: Return mobility to safest level of function  Outcome: Progressing     Problem: Gastrointestinal - Adult  Goal: Maintains or returns to baseline bowel function  Outcome: Progressing     Problem: Gastrointestinal - Adult  Goal: Maintains adequate nutritional intake  Outcome: Progressing      Screening/Consent note:    Participation in the [employer] clinical trial was discussed with Patient today. All aspects of the study purpose and procedures were explained.  She was given ample time to review the consent and all questions were answered to his/her satisfaction. Patient aware that the clinical trial is voluntary and she may withdraw consent at any time without affecting the level of care they receive.  Subject signed consent without coercion and undue influence and was given a copy of the signed consent. No study-related procedures took place prior to consenting and all assessments were conducted per protocol.  Inclusion criteria:    Age ?30 years within 30 days of enrollment yes -   Able and willing to provide blood samples per protocol Yes  Able to comprehend and willing to sign and date the informed consent and HIPAA Authorization documents yes -   Able and willing to allow their retrospective and prospective data to be utilized for study purposes yes -   Site/Sponsor has access to subject’s health information including past diagnoses, medications, and procedures and a minimum of 1 encounter in the site’s EHR system in the past 12 months yes -   Lung Group:  Subject must be diagnosed with pathologically-confirmed lung cancer (i.e., adenocarcinoma, squamous cell carcinoma, large cell carcinoma or small cell carcinoma) or have a presumptive diagnosis of or high clinical suspicion for the same by imaging (e.g., CT, MRI or PET) and have not yet received any cancer treatment (including but not limited to surgery, chemotherapy, and/or radiation) Yes -       Exclusion criteria:  Any history of solid organ or bone marrow transplantation no  Any physical trauma or surgery requiring inpatient overnight hospitalization in the 30 days preceding enrollment no  Received a blood transfusion in the 30 days preceding enrollment no  A medical condition that, in the opinion of the Investigator, should preclude  enrollment in the study no   Known to be pregnant no  Any therapy for cancer, including but not limited to surgery, chemotherapy, biologic therapy, immunotherapy, and/or radiation therapy in the 5 years preceding enrollment no   Participated or currently participating in a clinical research study in which an experimental medication has been administered during the 30 days preceding enrollment no   Participated or currently participating in another [employer]-sponsored clinical study no    Lung Group: Any previous cancer diagnosis (with the exception of basal cell skin cancer or squamous cell skin cancer) in the 5 years preceding enrollment, apart from the current lung cancer diagnosis  OR recurrence of the same primary cancer within any timeframe;  OR concurrent diagnosis of multiple primary cancers within any timeframe   No   Patient consented on ICF version 3, protocol version 2    Group: Lung  Cancer addendum: 1  Subject has a confirmed cancer diagnosis    Patient meets all eligibility criteria    Subject did not experience any adverse events or serious adverse events during today's study visit or blood draw    Study specimen was drawn prior to standard care cancer treatment

## 2025-05-31 NOTE — CARE COORDINATION
Post Acute Facility/Agency List     Provided patient with the following list, the list includes the overall star ratings obtained from CMS per the Medicare Web site (www.Medicare.gov):     [] Long Term Acute Care Facilities  [] Acute Inpatient Rehabilitation Facilities  [] Skilled Nursing Facilities  [] Hospice Facilities  [x] Home Care    Provided verbal instructions on how to utilize the QR Code to obtain additional detailed star ratings from www.Medicare.gov     offered to print and provide the detailed list:    []Accepted   [x]Declined    Has had [facility] in the past. Wants them again. [facility] did accept the patient.   Put in careport as well.

## 2025-05-31 NOTE — DISCHARGE INSTRUCTIONS
Alternate Tylenol and ibuprofen for pain.  Continue to pack midline and colostomy wound daily with iodoform.  Avoid smoking and drinking as this can impair healing.  Avoid any heavy lifting, anything more than 10 pounds, for 4 to 6 weeks.  Follow-up with Dr. Patel 1 week after discharge.  Call clinic to schedule appointment.

## 2025-05-31 NOTE — RT PROTOCOL NOTE
RT Inhaler-Nebulizer Bronchodilator Protocol Note    There is a bronchodilator order in the chart from a provider indicating to follow the RT Bronchodilator Protocol and there is an “Initiate RT Inhaler-Nebulizer Bronchodilator Protocol” order as well (see protocol at bottom of note).    CXR Findings:  No results found.    The findings from the last RT Protocol Assessment were as follows:   History Pulmonary Disease: Smoker 15 pack years or more  Respiratory Pattern: Regular pattern and RR 12-20 bpm  Breath Sounds: Slightly diminished and/or crackles  Cough: Strong, spontaneous, non-productive  Indication for Bronchodilator Therapy: Decreased or absent breath sounds  Bronchodilator Assessment Score: 3    Aerosolized bronchodilator medication orders have been revised according to the RT Inhaler-Nebulizer Bronchodilator Protocol below.    Respiratory Therapist to perform RT Therapy Protocol Assessment initially then follow the protocol.  Repeat RT Therapy Protocol Assessment PRN for score 0-3 or on second treatment, BID, and PRN for scores above 3.    No Indications - adjust the frequency to every 6 hours PRN wheezing or bronchospasm, if no treatments needed after 48 hours then discontinue using Per Protocol order mode.     If indication present, adjust the RT bronchodilator orders based on the Bronchodilator Assessment Score as indicated below.  Use Inhaler orders unless patient has one or more of the following: on home nebulizer, not able to hold breath for 10 seconds, is not alert and oriented, cannot activate and use MDI correctly, or respiratory rate 25 breaths per minute or more, then use the equivalent nebulizer order(s) with same Frequency and PRN reasons based on the score.  If a patient is on this medication at home then do not decrease Frequency below that used at home.    0-3 - enter or revise RT bronchodilator order(s) to equivalent RT Bronchodilator order with Frequency of every 4 hours PRN for wheezing  or increased work of breathing using Per Protocol order mode.        4-6 - enter or revise RT Bronchodilator order(s) to two equivalent RT bronchodilator orders with one order with BID Frequency and one order with Frequency of every 4 hours PRN wheezing or increased work of breathing using Per Protocol order mode.        7-10 - enter or revise RT Bronchodilator order(s) to two equivalent RT bronchodilator orders with one order with TID Frequency and one order with Frequency of every 4 hours PRN wheezing or increased work of breathing using Per Protocol order mode.       11-13 - enter or revise RT Bronchodilator order(s) to one equivalent RT bronchodilator order with QID Frequency and an Albuterol order with Frequency of every 4 hours PRN wheezing or increased work of breathing using Per Protocol order mode.      Greater than 13 - enter or revise RT Bronchodilator order(s) to one equivalent RT bronchodilator order with every 4 hours Frequency and an Albuterol order with Frequency of every 2 hours PRN wheezing or increased work of breathing using Per Protocol order mode.     RT to enter RT Home Evaluation for COPD & MDI Assessment order using Per Protocol order mode.    Electronically signed by COLBY JONES RCP on 5/30/2025 at 8:18 PM

## 2025-05-31 NOTE — DISCHARGE INSTR - ACTIVITY
leg(s).  Increased pain, redness, swelling, bleeding, or foul-smelling drainage at the incision site.  Incision changes, separates or is hot to the touch.  Problems with the drain if you have one.  Itchy, swollen skin; skin rash.    Medicines, Diet, and Activity:   Refer to your discharge paperwork for further instructions

## 2025-06-01 VITALS
BODY MASS INDEX: 24.76 KG/M2 | DIASTOLIC BLOOD PRESSURE: 86 MMHG | WEIGHT: 148.59 LBS | RESPIRATION RATE: 17 BRPM | OXYGEN SATURATION: 96 % | HEIGHT: 65 IN | HEART RATE: 92 BPM | SYSTOLIC BLOOD PRESSURE: 123 MMHG | TEMPERATURE: 97.9 F

## 2025-06-01 PROBLEM — G89.18 POST-OP PAIN: Status: ACTIVE | Noted: 2025-06-01

## 2025-06-01 LAB
ANION GAP SERPL CALCULATED.3IONS-SCNC: 12 MMOL/L (ref 9–16)
BASOPHILS # BLD: 0.06 K/UL (ref 0–0.2)
BASOPHILS NFR BLD: 1 % (ref 0–2)
BUN SERPL-MCNC: 16 MG/DL (ref 6–20)
CALCIUM SERPL-MCNC: 9.6 MG/DL (ref 8.6–10.4)
CHLORIDE SERPL-SCNC: 99 MMOL/L (ref 98–107)
CO2 SERPL-SCNC: 24 MMOL/L (ref 20–31)
CREAT SERPL-MCNC: 0.7 MG/DL (ref 0.7–1.2)
EOSINOPHIL # BLD: 0.37 K/UL (ref 0–0.44)
EOSINOPHILS RELATIVE PERCENT: 6 % (ref 1–4)
ERYTHROCYTE [DISTWIDTH] IN BLOOD BY AUTOMATED COUNT: 13.1 % (ref 11.8–14.4)
GFR, ESTIMATED: >90 ML/MIN/1.73M2
GLUCOSE BLD-MCNC: 89 MG/DL (ref 75–110)
GLUCOSE SERPL-MCNC: 94 MG/DL (ref 74–99)
HCT VFR BLD AUTO: 36 % (ref 40.7–50.3)
HGB BLD-MCNC: 12.9 G/DL (ref 13–17)
IMM GRANULOCYTES # BLD AUTO: 0.03 K/UL (ref 0–0.3)
IMM GRANULOCYTES NFR BLD: 0 %
LYMPHOCYTES NFR BLD: 1.15 K/UL (ref 1.1–3.7)
LYMPHOCYTES RELATIVE PERCENT: 17 % (ref 24–43)
MCH RBC QN AUTO: 33.1 PG (ref 25.2–33.5)
MCHC RBC AUTO-ENTMCNC: 35.8 G/DL (ref 28.4–34.8)
MCV RBC AUTO: 92.3 FL (ref 82.6–102.9)
MONOCYTES NFR BLD: 0.83 K/UL (ref 0.1–1.2)
MONOCYTES NFR BLD: 12 % (ref 3–12)
NEUTROPHILS NFR BLD: 64 % (ref 36–65)
NEUTS SEG NFR BLD: 4.27 K/UL (ref 1.5–8.1)
NRBC BLD-RTO: 0 PER 100 WBC
PLATELET # BLD AUTO: 176 K/UL (ref 138–453)
PMV BLD AUTO: 10.1 FL (ref 8.1–13.5)
POTASSIUM SERPL-SCNC: 4.2 MMOL/L (ref 3.7–5.3)
RBC # BLD AUTO: 3.9 M/UL (ref 4.21–5.77)
SODIUM SERPL-SCNC: 135 MMOL/L (ref 136–145)
WBC OTHER # BLD: 6.7 K/UL (ref 3.5–11.3)

## 2025-06-01 PROCEDURE — 82947 ASSAY GLUCOSE BLOOD QUANT: CPT

## 2025-06-01 PROCEDURE — 36415 COLL VENOUS BLD VENIPUNCTURE: CPT

## 2025-06-01 PROCEDURE — 80048 BASIC METABOLIC PNL TOTAL CA: CPT

## 2025-06-01 PROCEDURE — 85025 COMPLETE CBC W/AUTO DIFF WBC: CPT

## 2025-06-01 PROCEDURE — 6370000000 HC RX 637 (ALT 250 FOR IP): Performed by: SURGERY

## 2025-06-01 PROCEDURE — 6360000002 HC RX W HCPCS: Performed by: SURGERY

## 2025-06-01 PROCEDURE — 2500000003 HC RX 250 WO HCPCS: Performed by: SURGERY

## 2025-06-01 RX ORDER — OXYCODONE HYDROCHLORIDE 5 MG/1
5 TABLET ORAL EVERY 6 HOURS PRN
Qty: 20 TABLET | Refills: 0 | Status: SHIPPED | OUTPATIENT
Start: 2025-06-01 | End: 2025-06-06

## 2025-06-01 RX ORDER — GAUZE BANDAGE 2" X 2"
100 BANDAGE TOPICAL DAILY
Status: DISCONTINUED | OUTPATIENT
Start: 2025-06-01 | End: 2025-06-01 | Stop reason: HOSPADM

## 2025-06-01 RX ORDER — LISINOPRIL 20 MG/1
20 TABLET ORAL DAILY
Qty: 30 TABLET | Refills: 3 | Status: SHIPPED | OUTPATIENT
Start: 2025-06-02 | End: 2025-06-01

## 2025-06-01 RX ORDER — OXYCODONE HYDROCHLORIDE 5 MG/1
5 TABLET ORAL EVERY 6 HOURS PRN
Qty: 20 TABLET | Refills: 0 | Status: SHIPPED | OUTPATIENT
Start: 2025-06-01 | End: 2025-06-01

## 2025-06-01 RX ORDER — LISINOPRIL 20 MG/1
20 TABLET ORAL DAILY
Qty: 30 TABLET | Refills: 3 | Status: SHIPPED | OUTPATIENT
Start: 2025-06-02

## 2025-06-01 RX ADMIN — ACETAMINOPHEN 650 MG: 325 TABLET ORAL at 08:30

## 2025-06-01 RX ADMIN — FAMOTIDINE 20 MG: 20 TABLET, FILM COATED ORAL at 08:30

## 2025-06-01 RX ADMIN — SODIUM CHLORIDE, PRESERVATIVE FREE 10 ML: 5 INJECTION INTRAVENOUS at 08:34

## 2025-06-01 RX ADMIN — OXYCODONE HYDROCHLORIDE 10 MG: 5 TABLET ORAL at 10:46

## 2025-06-01 RX ADMIN — ACETAMINOPHEN 650 MG: 325 TABLET ORAL at 06:05

## 2025-06-01 RX ADMIN — KETOROLAC TROMETHAMINE 30 MG: 30 INJECTION, SOLUTION INTRAMUSCULAR at 08:31

## 2025-06-01 RX ADMIN — OXYCODONE HYDROCHLORIDE 10 MG: 5 TABLET ORAL at 15:26

## 2025-06-01 RX ADMIN — LISINOPRIL 20 MG: 20 TABLET ORAL at 08:30

## 2025-06-01 RX ADMIN — ENOXAPARIN SODIUM 40 MG: 100 INJECTION SUBCUTANEOUS at 08:31

## 2025-06-01 RX ADMIN — OXYCODONE HYDROCHLORIDE 10 MG: 5 TABLET ORAL at 06:05

## 2025-06-01 RX ADMIN — THIAMINE HCL TAB 100 MG 100 MG: 100 TAB at 08:30

## 2025-06-01 RX ADMIN — KETOROLAC TROMETHAMINE 30 MG: 30 INJECTION, SOLUTION INTRAMUSCULAR at 06:07

## 2025-06-01 RX ADMIN — LORAZEPAM 2 MG: 1 TABLET ORAL at 08:32

## 2025-06-01 ASSESSMENT — PAIN DESCRIPTION - DESCRIPTORS
DESCRIPTORS: ACHING
DESCRIPTORS: ACHING
DESCRIPTORS: ACHING;SORE
DESCRIPTORS: ACHING

## 2025-06-01 ASSESSMENT — PAIN DESCRIPTION - ONSET
ONSET: ON-GOING

## 2025-06-01 ASSESSMENT — PAIN DESCRIPTION - PAIN TYPE
TYPE: SURGICAL PAIN

## 2025-06-01 ASSESSMENT — PAIN - FUNCTIONAL ASSESSMENT
PAIN_FUNCTIONAL_ASSESSMENT: ACTIVITIES ARE NOT PREVENTED

## 2025-06-01 ASSESSMENT — PAIN SCALES - GENERAL
PAINLEVEL_OUTOF10: 8
PAINLEVEL_OUTOF10: 3
PAINLEVEL_OUTOF10: 10
PAINLEVEL_OUTOF10: 7
PAINLEVEL_OUTOF10: 3
PAINLEVEL_OUTOF10: 9
PAINLEVEL_OUTOF10: 8
PAINLEVEL_OUTOF10: 3

## 2025-06-01 ASSESSMENT — PAIN DESCRIPTION - FREQUENCY
FREQUENCY: CONTINUOUS

## 2025-06-01 ASSESSMENT — PAIN DESCRIPTION - LOCATION
LOCATION: ABDOMEN

## 2025-06-01 NOTE — PROGRESS NOTES
Second call made to [facility] [employer]. They are still attempting to find a  and one will arrive in 30-40 minutes. Patient updated.

## 2025-06-01 NOTE — FLOWSHEET NOTE
06/01/25 1727   Discharge Event   Discharged with Documented Belongings Yes   Departure Mode In cab   Mobility at Departure Ambulatory   Discharged to Private Residence   Time of Discharge 1605     Patient discharged to home with all documented belongings in stable condition. Patient states understanding of all discharge instructions.

## 2025-06-01 NOTE — PROGRESS NOTES
Call made to [facility] [employer] for discharge. Response received that cab will arrive as soon as possible.

## 2025-06-01 NOTE — PLAN OF CARE
Problem: Discharge Planning  Goal: Discharge to home or other facility with appropriate resources  6/1/2025 1724 by Laine Carcamo, RN  Outcome: Adequate for Discharge  Flowsheets  Taken 6/1/2025 1337 by Laine Carcamo RN  Discharge to home or other facility with appropriate resources:   Arrange for needed discharge resources and transportation as appropriate   Identify barriers to discharge with patient and caregiver   Identify discharge learning needs (meds, wound care, etc)   Refer to discharge planning if patient needs post-hospital services based on physician order or complex needs related to functional status, cognitive ability or social support system  Taken 6/1/2025 0845 by Shemar Medina RN  Discharge to home or other facility with appropriate resources:   Identify barriers to discharge with patient and caregiver   Arrange for needed discharge resources and transportation as appropriate   Identify discharge learning needs (meds, wound care, etc)   Refer to discharge planning if patient needs post-hospital services based on physician order or complex needs related to functional status, cognitive ability or social support system     Problem: Pain  Goal: Verbalizes/displays adequate comfort level or baseline comfort level  Outcome: Adequate for Discharge  Flowsheets (Taken 6/1/2025 0830)  Verbalizes/displays adequate comfort level or baseline comfort level:   Encourage patient to monitor pain and request assistance   Assess pain using appropriate pain scale   Administer analgesics based on type and severity of pain and evaluate response   Implement non-pharmacological measures as appropriate and evaluate response   Notify Licensed Independent Practitioner if interventions unsuccessful or patient reports new pain     Problem: Neurosensory - Adult  Goal: Achieves stable or improved neurological status  6/1/2025 1724 by Laine Carcamo, RN  Outcome: Adequate for Discharge     Problem: Skin/Tissue Integrity -  activities of daily living deficits and provide assistive devices as needed   Obtain physical therapy/occupational therapy consults as needed   Assist and instruct patient to increase activity and self care as tolerated     Problem: Gastrointestinal - Adult  Goal: Minimal or absence of nausea and vomiting  6/1/2025 1724 by Laine Carcamo, RN  Outcome: Adequate for Discharge     Problem: Gastrointestinal - Adult  Goal: Maintains or returns to baseline bowel function  Outcome: Adequate for Discharge     Problem: Gastrointestinal - Adult  Goal: Maintains adequate nutritional intake  Outcome: Adequate for Discharge     Problem: Metabolic/Fluid and Electrolytes - Adult  Goal: Electrolytes maintained within normal limits  Outcome: Adequate for Discharge  Flowsheets (Taken 6/1/2025 4374 by Shemar Medina, RN)  Electrolytes maintained within normal limits:   Monitor labs and assess patient for signs and symptoms of electrolyte imbalances   Administer electrolyte replacement as ordered   Monitor response to electrolyte replacements, including repeat lab results as appropriate   Instruct patient on fluid and nutrition restrictions as appropriate     Problem: ABCDS Injury Assessment  Goal: Absence of physical injury  Outcome: Adequate for Discharge     Problem: Anxiety  Goal: Will report anxiety at manageable levels  Description: INTERVENTIONS:1. Administer medication as ordered2. Teach and rehearse alternative coping skills3. Provide emotional support with 1:1 interaction with staff  Outcome: Adequate for Discharge     Problem: Drug Abuse/Detox  Goal: Will have no detox symptoms and will verbalize plan for changing drug-related behavior  Description: INTERVENTIONS:1. Administer medication as ordered2. Monitor physical status3. Provide emotional support with 1:1 interaction with staff4. Encourage  recovery focused treatment   Outcome: Adequate for Discharge

## 2025-06-01 NOTE — PLAN OF CARE
Problem: Discharge Planning  Goal: Discharge to home or other facility with appropriate resources  Outcome: Progressing     Problem: Neurosensory - Adult  Goal: Achieves stable or improved neurological status  Outcome: Progressing     Problem: Skin/Tissue Integrity - Adult  Goal: Incisions, wounds, or drain sites healing without S/S of infection  Outcome: Progressing     Problem: Musculoskeletal - Adult  Goal: Return mobility to safest level of function  Outcome: Adequate for Discharge     Problem: Gastrointestinal - Adult  Goal: Minimal or absence of nausea and vomiting  Outcome: Progressing

## 2025-06-01 NOTE — PROGRESS NOTES
Additional call made to [employer] [facility] [employer] in regards to  time. Response received that they are still attempting to find a  and hopefully will be able to get patient in the next 20 minutes.

## 2025-06-01 NOTE — PROGRESS NOTES
General Surgery:  Daily Progress Note          PATIENT NAME: Abdifatah De Anda     TODAY'S DATE: 6/1/2025, 5:45 AM  CC: I feel better today    SUBJECTIVE:     Pt seen and examined at bedside.  Patient is sitting up in bed.  He reports his pain is moderately controlled at this time. He is tolerating a low fiber diet.  Appetite is improved.  Patient is sleeping better.  Voiding appropriately. Patient reports multiple small bowel movements yesterday. Abdominal dressing with minimal strikethrough.  Packing and dressing were changed at bedside this morning.  Patient continues to walk outside for regular smoke breaks at this time.     OBJECTIVE:   VITALS:  /85   Pulse 85   Temp 98.1 °F (36.7 °C)   Resp 16   Ht 1.651 m (5' 5\")   Wt 67.4 kg (148 lb 9.4 oz)   SpO2 94%   BMI 24.73 kg/m²      INTAKE/OUTPUT:    No intake or output data in the 24 hours ending 06/01/25 0545    PHYSICAL EXAM:  General Appearance: awake, alert, oriented, in no acute distress, well developed, well nourished, and in no acute distress  HEENT:  Normocephalic, atraumatic, mucus membranes moist   Heart: Heart regular rate and rhythm  Lungs: Normal expansion.  Clear to auscultation.  No rales, rhonchi, or wheezing.  Abdomen: Soft, mildly distended, appropriate tenderness to palpation.  Midline incision with staples and minimal erythema.  Colostomy site loosely approximated with staples with minimal erythema.  No drainage from either site.  Dressing with minimal strikethrough.  Packing and dressing changed this morning.  Extremities: No cyanosis, pitting edema, rashes noted.    Skin: Skin color, texture, turgor normal. No rashes or lesions.    Data:  CBC with Differential:    Lab Results   Component Value Date/Time    WBC 7.6 05/31/2025 06:58 AM    RBC 3.93 05/31/2025 06:58 AM    HGB 12.9 05/31/2025 06:58 AM    HCT 36.6 05/31/2025 06:58 AM     05/31/2025 06:58 AM    MCV 93.1 05/31/2025 06:58 AM    MCH 32.8 05/31/2025 06:58 AM    MCHC

## 2025-06-01 NOTE — DISCHARGE SUMMARY
Physician Discharge Summary     Patient ID:  Abdifatah RAI Dzagulones  [id number]  40 y.o.  1985    Admit date: 5/28/2025    Discharge date and time: 6/1/2025     Admitting Physician: Thien Patel MD     Discharge Physician: Aaron Van DO    Admission Diagnoses:   Diverticulosis [K57.90]  Diverticular disease [K57.90]    Discharge Diagnoses:   Same    Admission Condition: good    Discharged Condition: good    Hospital Course:   Patient presented for partial colectomy, reversal of colostomy, lysis of adhesion, repair incisional hernia , tap block, removal of eschar, appendectomy after sigmoid resection and colostomy creation on 10/2024. Patient tolerated the procedure well without any complications postoperatively. Midline and colostomy incision were packed daily. Patient started having bowel function 2 days after surgery.    Consults: none    Significant Diagnostic Studies:     Lab Results   Component Value Date    WBC 6.7 06/01/2025    HGB 12.9 (L) 06/01/2025    HCT 36.0 (L) 06/01/2025    MCV 92.3 06/01/2025     06/01/2025     Lab Results   Component Value Date/Time     06/01/2025 05:44 AM    K 4.2 06/01/2025 05:44 AM    CL 99 06/01/2025 05:44 AM    CO2 24 06/01/2025 05:44 AM    BUN 16 06/01/2025 05:44 AM    CREATININE 0.7 06/01/2025 05:44 AM    GLUCOSE 94 06/01/2025 05:44 AM    CALCIUM 9.6 06/01/2025 05:44 AM    LABGLOM >90 06/01/2025 05:44 AM      Treatments: surgery: Partial colectomy, reversal of colostomy, lysis of adhesion, repair incisional hernia , tap block, removal of eschar, appendectomy     Discharge Exam:  General Appearance: awake, alert, oriented, in no acute distress, well developed, well nourished, and in no acute distress  HEENT:  Normocephalic, atraumatic, mucus membranes moist   Heart: Heart regular rate and rhythm  Lungs: Normal expansion.  Clear to auscultation.  No rales, rhonchi, or wheezing.  Abdomen: Soft, mildly distended, appropriate tenderness to palpation.  Midline incision

## 2025-09-02 ENCOUNTER — OFFICE VISIT (OUTPATIENT)
Age: 40
End: 2025-09-02

## 2025-09-02 VITALS
HEART RATE: 109 BPM | BODY MASS INDEX: 24.99 KG/M2 | RESPIRATION RATE: 16 BRPM | SYSTOLIC BLOOD PRESSURE: 180 MMHG | WEIGHT: 150 LBS | DIASTOLIC BLOOD PRESSURE: 108 MMHG | OXYGEN SATURATION: 99 % | HEIGHT: 65 IN | TEMPERATURE: 97.2 F

## 2025-09-02 DIAGNOSIS — K52.9 ACUTE GASTROENTERITIS: Primary | ICD-10-CM

## 2025-09-02 RX ORDER — ONDANSETRON 4 MG/1
4 TABLET, FILM COATED ORAL 3 TIMES DAILY PRN
Qty: 15 TABLET | Refills: 0 | Status: SHIPPED | OUTPATIENT
Start: 2025-09-02

## 2025-09-02 ASSESSMENT — ENCOUNTER SYMPTOMS
DIARRHEA: 0
ABDOMINAL PAIN: 1
NAUSEA: 1
ABDOMINAL DISTENTION: 0
WHEEZING: 0
CHOKING: 0
SHORTNESS OF BREATH: 0
CONSTIPATION: 0
VOMITING: 1

## (undated) DEVICE — SUTURE VICRYL + SZ 2-0 L27IN ABSRB WHT SH 1/2 CIR TAPERCUT VCP417H

## (undated) DEVICE — Device

## (undated) DEVICE — 4.0MM PRECISION ROUND

## (undated) DEVICE — COVER LT HNDL BLU PLAS

## (undated) DEVICE — NEPTUNE E-SEP 165MM SUCTION SLEEVE: Brand: NEPTUNE E-SEP

## (undated) DEVICE — GLOVE SURG SZ 75 CRM LTX FREE POLYISOPRENE POLYMER BEAD ANTI

## (undated) DEVICE — SUTURE VICRYL + SZ 2-0 L54IN ABSRB VLT TIE POLYGLACTIN 910 VCP286G

## (undated) DEVICE — DRAPE,REIN 53X77,STERILE: Brand: MEDLINE

## (undated) DEVICE — ADHESIVE SKIN CLSR 0.7ML TOP DERMBND ADV

## (undated) DEVICE — SEALER ENDOSCP NANO COAT OPN DIV CRV L JAW LIGASURE IMPACT

## (undated) DEVICE — GAUZE,SPONGE,FLUFF,6"X6.75",STRL,5/TRAY: Brand: MEDLINE

## (undated) DEVICE — INSERT CUSHION HEAD PRONEVIEW

## (undated) DEVICE — STAZ MAJOR BASIN: Brand: MEDLINE INDUSTRIES, INC.

## (undated) DEVICE — GLOVE SURG SZ 75 L12IN FNGR THK79MIL GRN LTX FREE

## (undated) DEVICE — SUTURE VICRYL COAT  + LIGAPAK LIG REEL 54 IN SZ 0 UD BRAID VCP287G

## (undated) DEVICE — RESERVOIR,SUCTION,100CC,SILICONE: Brand: MEDLINE

## (undated) DEVICE — STAPLER INT CUT LN 40MM STPL 51MM GRN CRV HD B FRM

## (undated) DEVICE — BLADE ES L6IN ELASTOMERIC COAT EXT DURABLE BEND UPTO 90DEG

## (undated) DEVICE — ELECTRODE PT RET AD L9FT HI MOIST COND ADH HYDRGEL CORDED

## (undated) DEVICE — GLOVE SURG SZ 8 CRM LTX FREE POLYISOPRENE POLYMER BEAD ANTI

## (undated) DEVICE — CABLE FIX TRANSFORAMINAL LUM INTBDY FUS LT SELF RET MAS

## (undated) DEVICE — WOUND RETRACTOR AND PROTECTOR: Brand: ALEXIS O WOUND PROTECTOR-RETRACTOR

## (undated) DEVICE — TOTAL TRAY, DB, 100% SILI FOLEY, 16FR 10: Brand: MEDLINE

## (undated) DEVICE — STAPLER INT L75MM CUT LN L73MM STPL LN L77MM BLU B FRM 8

## (undated) DEVICE — PAD,ABDOMINAL,5"X9",ST,LF,25/BX: Brand: MEDLINE INDUSTRIES, INC.

## (undated) DEVICE — 4-PORT MANIFOLD: Brand: NEPTUNE 2

## (undated) DEVICE — CUSHION PRONEVIEW L HD NK FOAM

## (undated) DEVICE — THE MILL DISPOSABLE - MEDIUM

## (undated) DEVICE — BLANKET WRM W29.9XL79.1IN UP BODY FORC AIR MISTRAL-AIR

## (undated) DEVICE — 3M(TM) MEDIPORE(TM) +PAD SOFT CLOTH ADHESIVE WOUND DRESSING 3569: Brand: 3M™ MEDIPORE™

## (undated) DEVICE — GOWN,SIRUS,NON REINFRCD,LARGE,SET IN SL: Brand: MEDLINE

## (undated) DEVICE — STAPLER INT L28CM DIA29MM CLS STPL H10-2.5MM OPN LEG L5.5MM

## (undated) DEVICE — SUTURE MONOCRYL SZ 3-0 L27IN ABSRB UD L26MM SH 1/2 CIR Y416H

## (undated) DEVICE — GAUZE,PACKING STRIP,IODOFORM,1/2"X5YD,ST: Brand: CURAD

## (undated) DEVICE — SUTURE PROL SZ 1 L30IN NONABSORBABLE BLU CTX L48MM 1/2 CIR 8455H

## (undated) DEVICE — DRAIN SURG W10XL20CM SIL SMOOTH FLAT 3/4 PERF DBL WRP

## (undated) DEVICE — COVER,TABLE,HEAVY DUTY,50"X90",STRL: Brand: MEDLINE

## (undated) DEVICE — CONMED ACCESSORY ELECTRODE, NEEDLE ELECTRODE: Brand: CONMED

## (undated) DEVICE — GLOVE SURG SZ 7 CRM LTX FREE POLYISOPRENE POLYMER BEAD ANTI

## (undated) DEVICE — DRAPE EQUIP XR 12 IN C ARM SET OEC

## (undated) DEVICE — SUTURE VCRL SZ 2-0 L36IN ABSRB UD L36MM CT-1 1/2 CIR J945H

## (undated) DEVICE — DRAPE,LAP,CHOLE,W/TROUGHS,STERILE: Brand: MEDLINE

## (undated) DEVICE — JCKSON TBL POSTNER NO HD REST: Brand: MEDLINE INDUSTRIES, INC.

## (undated) DEVICE — CODMAN® SURGICAL PATTIES 1" X 1" (2.54CM X 2.54CM): Brand: CODMAN®

## (undated) DEVICE — SYRINGE IRRIG 60ML SFT PLIABLE BLB EZ TO GRP 1 HND USE W/

## (undated) DEVICE — PAD,NON-ADHERENT,3X8,STERILE,LF,1/PK: Brand: MEDLINE

## (undated) DEVICE — NEEDLE NRV STIM BVL TIP INSUL PEDCL ACCS SYS FOR EMG MON

## (undated) DEVICE — SPONGE LAP W18XL18IN WHT COT 4 PLY FLD STRUNG RADPQ DISP ST

## (undated) DEVICE — ELECTRODE ES L6.5IN DIA2.4MM TIP L0.2IN S STL EXT INSUL NDL

## (undated) DEVICE — RETRACTOR SURG MAS TLIF HOOP SHIM DISP MAXCESS

## (undated) DEVICE — K WIRE FIX NIT BVL BLNT TIP PRECEPT
Type: IMPLANTABLE DEVICE | Site: BACK | Status: NON-FUNCTIONAL
Removed: 2020-09-02

## (undated) DEVICE — SUTURE VICRYL + SZ 3-0 L27IN ABSRB UD L26MM SH 1/2 CIR VCP416H

## (undated) DEVICE — BLADE CLIPPER GEN PURP NS

## (undated) DEVICE — JELLY,LUBE,STERILE,FLIP TOP,TUBE,2-OZ: Brand: MEDLINE

## (undated) DEVICE — SUTURE VICRYL + SZ 4-0 L27IN ABSRB UD L24MM FS-1 3/8 CIR REV VCP441H

## (undated) DEVICE — SPONGE NEURO STRP 1 STRIN IIN X 1IN

## (undated) DEVICE — GOWN,SIRUS,NONRNF,SETINSLV,XL,20/CS: Brand: MEDLINE

## (undated) DEVICE — 450 ML BOTTLE OF 0.05% CHLORHEXIDINE GLUCONATE IN 99.95% STERILE WATER FOR IRRIGATION, USP AND APPLICATOR.: Brand: IRRISEPT ANTIMICROBIAL WOUND LAVAGE

## (undated) DEVICE — GLOVE SURG SZ 85 CRM LTX FREE POLYISOPRENE POLYMER BEAD ANTI

## (undated) DEVICE — GLOVE SURG SZ 65 CRM LTX FREE POLYISOPRENE POLYMER BEAD ANTI

## (undated) DEVICE — PAD ABSRB W8XL10IN ABD HYDROPHOBIC NONWOVEN THCK LAYR CELOS

## (undated) DEVICE — CORD,CAUTERY,BIPOLAR,STERILE: Brand: MEDLINE

## (undated) DEVICE — SUTURE PROL SZ 4-0 L30IN NONABSORBABLE BLU SH L26MM 1/2 CIR 8831H

## (undated) DEVICE — 1010 S-DRAPE TOWEL DRAPE 10/BX: Brand: STERI-DRAPE™

## (undated) DEVICE — BINDER ABD UNISX 3 PNL E PREM CNTCT CLSR L XL 46INX62INX9IN

## (undated) DEVICE — BLADE,CARBON-STEEL,15,STRL,DISPOSABLE,TB: Brand: MEDLINE

## (undated) DEVICE — SOLUTION IRRIG 1000ML 0.9% SOD CHL USP POUR PLAS BTL